# Patient Record
Sex: FEMALE | Race: OTHER | Employment: UNEMPLOYED | URBAN - METROPOLITAN AREA
[De-identification: names, ages, dates, MRNs, and addresses within clinical notes are randomized per-mention and may not be internally consistent; named-entity substitution may affect disease eponyms.]

---

## 2017-09-08 ENCOUNTER — HOSPITAL ENCOUNTER (EMERGENCY)
Age: 27
Discharge: HOME | End: 2017-09-09
Attending: EMERGENCY MEDICINE
Payer: OTHER MISCELLANEOUS

## 2017-09-08 DIAGNOSIS — Z77.098 CHEMICAL EXPOSURE: Primary | ICD-10-CM

## 2017-09-08 PROCEDURE — 99282 EMERGENCY DEPT VISIT SF MDM: CPT

## 2017-09-09 VITALS
HEART RATE: 80 BPM | SYSTOLIC BLOOD PRESSURE: 109 MMHG | TEMPERATURE: 97.9 F | DIASTOLIC BLOOD PRESSURE: 72 MMHG | OXYGEN SATURATION: 100 % | RESPIRATION RATE: 15 BRPM

## 2017-09-09 NOTE — DISCHARGE INSTRUCTIONS
Exposure to Toxins: Care Instructions  Your Care Instructions  Toxins are poisonous substances that can harm your body. If your doctor is concerned that your symptoms are caused by exposure to a toxic substance, he or she may ask you about your home, your work, your family, and other aspects of your environment. You also may have blood tests or X-rays to find out if a toxin is in your body. For example, you may have been around smoke from a fire. Or you may have been around fumes from paints, solvents, or waste products from workshops or factories. But in some cases it may be hard to find out what you may have been exposed to. Sometimes it can take years before you have symptoms. For instance, a  may have lung disease many years after working in mines. And being exposed to some toxins can make health problems you already have worse. Follow-up care is a key part of your treatment and safety. Be sure to make and go to all appointments, and call your doctor if you are having problems. It's also a good idea to know your test results and keep a list of the medicines you take. How can you care for yourself at home? · If you think you may have been exposed to a toxin but are not sure what it might be, try to keep a written record of your symptoms. Note when and where you have symptoms. And note any possible health hazards. For example, you may find out that you feel sick to your stomach during your workweek, but you feel better on weekends. · It may help to increase the amount of fresh air in your home. · If you can, try to control your exposure to hazardous materials. ¨ Avoid cigarette smoke. Cigarettes contain many chemicals that are hazardous to your health. ¨ Keep your home clean and as free from dust as you can. Dust can irritate your lungs. ¨ Get a carbon monoxide alarm for your home. Carbon monoxide comes from cars, space heaters, and other heat sources. It can be deadly.   ¨ When you use cleaning or home improvement products, make sure to open windows or use an exhaust fan. Never mix household chemicals, such as chlorine and ammonia. Some mixtures can create toxic fumes that can be deadly. ¨ Read the label on house and garden chemicals. Be sure to follow all safety directions. Try to limit your use of lawn and garden pesticides. ¨ Keep in mind that the farther away you are from a hazardous source, the less exposure you will receive. When should you call for help? Call 911 anytime you think you may need emergency care. For example, call if:  · You have trouble breathing. Call your doctor now or seek immediate medical care if:  · You get household chemicals in your mouth or eyes. Call the [address] [place] [address] ([phone]). · You think you may have been exposed to a hazardous material.  Watch closely for changes in your health, and be sure to contact your doctor if:  · You do not get better as expected. Where can you learn more? Go to http://carmen-harjinder.info/. Enter B226 in the search box to learn more about \"Exposure to Toxins: Care Instructions. \"  Current as of: July 29, 2016  Content Version: 11.3  © 5740-8521 [employer]. Care instructions adapted under license by [employer] (which disclaims liability or warranty for this information). If you have questions about a medical condition or this instruction, always ask your healthcare professional. [place] [address] any warranty or liability for your use of this information.

## 2017-09-09 NOTE — ED TRIAGE NOTES
Assumed care of patient from decontamination process with 15 minute decon shower for organophosphate exposure around 2000 at place of work. Pt. States they were exposed to bug bomb chemical for approximately 30 minutes. Patient is A&Ox4, respirations even and unlabored. Breath sounds clear in all fields. No symptoms of SLUDGE. VS stable. Patient reports she is 6 weeks pregnant, and has had some nausea with the pregnancy, but is not having any now.

## 2017-09-09 NOTE — ED PROVIDER NOTES
HPI Comments: Leo Mahoney is a 11 week pregnant 32 y.o. female, no significant pmhx, who presents ambulatory to the ED c/o sudden onset HA x 1930 today. 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 Pt reports that the bug bomb went off at ~7:30PM (usually scheduled for 1am when all workers are gone), and further report that she was exposed for ~1hr. Pt states that those first exposed to the gas spoke with the supervisors, who advised they come into the [address] [facility] ED for evaluation. She reports that her HA began after her exposure to the organophosphates. She endorses nausea with her pregnancy, but has not had any nausea today. Pt specifically denies nausea, vomiting or diarrhea. PCP: PROVIDER UNKNOWN      Social Hx: -tobacco, -EtOH, -Illicit Drugs  FHx: no pertinent family hx   Medication Allergies: none      There are no other complaints, changes, or physical findings at this time. The history is provided by the patient. History reviewed. No pertinent past medical history. History reviewed. No pertinent surgical history. History reviewed. No pertinent family history. Social History     Social History    Marital status: SINGLE     Spouse name: N/A    Number of children: N/A    Years of education: N/A     Occupational History    Not on file. Social History Main Topics    Smoking status: Never Smoker    Smokeless tobacco: Never Used    Alcohol use No    Drug use: Not on file    Sexual activity: Not on file     Other Topics Concern    Not on file     Social History Narrative    No narrative on file         ALLERGIES: Review of patient's allergies indicates no known allergies. Review of Systems   Constitutional: Negative for activity change, appetite change, chills, fever and unexpected weight change. HENT: Negative for congestion. Eyes: Negative for pain and visual disturbance. Respiratory: Negative for cough and shortness of breath. Cardiovascular: Negative for chest pain. Gastrointestinal: Negative for abdominal pain, diarrhea, nausea and vomiting. Genitourinary: Negative for dysuria. Musculoskeletal: Negative for back pain. Skin: Negative for rash. Neurological: Positive for headaches. Patient Vitals for the past 12 hrs:   Temp Pulse Resp BP SpO2   09/09/17 0022 97.9 °F (36.6 °C) 80 15 109/72 100 %       Physical Exam   Constitutional: She is oriented to person, place, and time. She appears well-developed and well-nourished. HENT:   Head: Normocephalic and atraumatic. Mouth/Throat: Oropharynx is clear and moist.   No excessive secretions    Eyes: Conjunctivae and EOM are normal. Pupils are equal, round, and reactive to light. Right eye exhibits no discharge. Left eye exhibits no discharge. Neck: Normal range of motion. Neck supple. Cardiovascular: Normal rate and normal heart sounds. No murmur heard. Pulmonary/Chest: Effort normal and breath sounds normal. No respiratory distress. She has no wheezes. She has no rales. Abdominal: Soft. Bowel sounds are normal. She exhibits no distension. There is no tenderness. Musculoskeletal: Normal range of motion. Neurological: She is alert and oriented to person, place, and time. No cranial nerve deficit. She exhibits normal muscle tone. Skin: Skin is warm and dry. No rash noted. She is not diaphoretic. Nursing note and vitals reviewed. MDM  Number of Diagnoses or Management Options  Chemical exposure:   Diagnosis management comments: Organophosphate exposure with no evidence of SLUDGE. Pt denies any abdominal cramping, VB. Discussed home care as well as after care instructions. Concern given pregnancy.         Amount and/or Complexity of Data Reviewed  Review and summarize past medical records: yes    Patient Progress  Patient progress: stable    ED Course       Procedures  DISCHARGE NOTE:    IMPRESSION:  1. Chemical exposure        PLAN:  Discharge Medication List as of 9/9/2017 12:43 AM        Follow-up Information     Follow up With Details Comments Contact Info    [facility] EMERGENCY DEPT  If symptoms worsen [address] [phone]        Return to ED if worse     DISCHARGE NOTE:  2:41 AM  Pt has been reexamined. Pt has no new complaints, changes, or physical findings. Care plan outlined and precautions discussed. All available results reviewed with pt. All medications reviewed with pt. All of pts questions and concerns addressed. Pt agrees to f/u as instructed and agrees to return to ED upon further deterioration. Pt is ready to go home. Written by Deng Tucker ED Scribe as dictated by Alexander Patrick MD    ATTESTATION   This note is prepared by Deng Tucker acting as scribe for MD Alexander Deluna MD : The scribe's documentation has been prepared under my direction and personally reviewed by me in its entirety. I confirm that the note above accurately reflects all work, treatment, procedures, and medical decision making performed by me.

## 2017-09-09 NOTE — ED NOTES
Dr. Ninfa Carter has reviewed discharge instructions with the patient. The patient verbalized understanding. Pt. A&Ox4, respirations even and unlabored. VS stable as noted in flowsheet. Declined wheelchair, ambulatory from department with paperwork in hand.

## 2019-04-22 ENCOUNTER — APPOINTMENT (OUTPATIENT)
Dept: GENERAL RADIOLOGY | Age: 29
End: 2019-04-22
Attending: PHYSICIAN ASSISTANT
Payer: MEDICAID

## 2019-04-22 ENCOUNTER — HOSPITAL ENCOUNTER (EMERGENCY)
Age: 29
Discharge: HOME | End: 2019-04-22
Attending: EMERGENCY MEDICINE
Payer: MEDICAID

## 2019-04-22 ENCOUNTER — APPOINTMENT (OUTPATIENT)
Dept: ULTRASOUND IMAGING | Age: 29
End: 2019-04-22
Attending: PHYSICIAN ASSISTANT
Payer: MEDICAID

## 2019-04-22 VITALS
HEART RATE: 100 BPM | SYSTOLIC BLOOD PRESSURE: 118 MMHG | RESPIRATION RATE: 16 BRPM | DIASTOLIC BLOOD PRESSURE: 78 MMHG | TEMPERATURE: 98.9 F | OXYGEN SATURATION: 99 %

## 2019-04-22 DIAGNOSIS — R10.13 ABDOMINAL PAIN, EPIGASTRIC: Primary | ICD-10-CM

## 2019-04-22 LAB
ALBUMIN SERPL-MCNC: 4 G/DL (ref 3.5–5)
ALBUMIN/GLOB SERPL: 1 {RATIO} (ref 1.1–2.2)
ALP SERPL-CCNC: 99 U/L (ref 45–117)
ALT SERPL-CCNC: 61 U/L (ref 12–78)
ANION GAP SERPL CALC-SCNC: 6 MMOL/L (ref 5–15)
APPEARANCE UR: ABNORMAL
AST SERPL-CCNC: 46 U/L (ref 15–37)
BACTERIA URNS QL MICRO: NEGATIVE /HPF
BASOPHILS # BLD: 0 K/UL (ref 0–0.1)
BASOPHILS NFR BLD: 0 % (ref 0–1)
BILIRUB SERPL-MCNC: 0.4 MG/DL (ref 0.2–1)
BILIRUB UR QL: NEGATIVE
BUN SERPL-MCNC: 7 MG/DL (ref 6–20)
BUN/CREAT SERPL: 12 (ref 12–20)
CALCIUM SERPL-MCNC: 9 MG/DL (ref 8.5–10.1)
CHLORIDE SERPL-SCNC: 106 MMOL/L (ref 97–108)
CO2 SERPL-SCNC: 25 MMOL/L (ref 21–32)
COLOR UR: ABNORMAL
COMMENT, HOLDF: NORMAL
CREAT SERPL-MCNC: 0.57 MG/DL (ref 0.55–1.02)
D DIMER PPP FEU-MCNC: 0.36 MG/L FEU (ref 0–0.65)
DIFFERENTIAL METHOD BLD: ABNORMAL
EOSINOPHIL # BLD: 0.1 K/UL (ref 0–0.4)
EOSINOPHIL NFR BLD: 3 % (ref 0–7)
EPITH CASTS URNS QL MICRO: ABNORMAL /LPF
ERYTHROCYTE [DISTWIDTH] IN BLOOD BY AUTOMATED COUNT: 12.5 % (ref 11.5–14.5)
GLOBULIN SER CALC-MCNC: 4 G/DL (ref 2–4)
GLUCOSE SERPL-MCNC: 125 MG/DL (ref 65–100)
GLUCOSE UR STRIP.AUTO-MCNC: NEGATIVE MG/DL
HCG UR QL: NEGATIVE
HCT VFR BLD AUTO: 39.8 % (ref 35–47)
HGB BLD-MCNC: 13.3 G/DL (ref 11.5–16)
HGB UR QL STRIP: ABNORMAL
HYALINE CASTS URNS QL MICRO: ABNORMAL /LPF (ref 0–5)
IMM GRANULOCYTES # BLD AUTO: 0 K/UL (ref 0–0.04)
IMM GRANULOCYTES NFR BLD AUTO: 0 % (ref 0–0.5)
KETONES UR QL STRIP.AUTO: NEGATIVE MG/DL
LEUKOCYTE ESTERASE UR QL STRIP.AUTO: NEGATIVE
LIPASE SERPL-CCNC: 174 U/L (ref 73–393)
LYMPHOCYTES # BLD: 1.6 K/UL (ref 0.8–3.5)
LYMPHOCYTES NFR BLD: 35 % (ref 12–49)
MCH RBC QN AUTO: 30.2 PG (ref 26–34)
MCHC RBC AUTO-ENTMCNC: 33.4 G/DL (ref 30–36.5)
MCV RBC AUTO: 90.2 FL (ref 80–99)
MONOCYTES # BLD: 0.7 K/UL (ref 0–1)
MONOCYTES NFR BLD: 14 % (ref 5–13)
NEUTS SEG # BLD: 2.2 K/UL (ref 1.8–8)
NEUTS SEG NFR BLD: 48 % (ref 32–75)
NITRITE UR QL STRIP.AUTO: NEGATIVE
NRBC # BLD: 0 K/UL (ref 0–0.01)
NRBC BLD-RTO: 0 PER 100 WBC
PH UR STRIP: 7.5 [PH] (ref 5–8)
PLATELET # BLD AUTO: 179 K/UL (ref 150–400)
PMV BLD AUTO: 10 FL (ref 8.9–12.9)
POTASSIUM SERPL-SCNC: 3.7 MMOL/L (ref 3.5–5.1)
PROT SERPL-MCNC: 8 G/DL (ref 6.4–8.2)
PROT UR STRIP-MCNC: NEGATIVE MG/DL
RBC # BLD AUTO: 4.41 M/UL (ref 3.8–5.2)
RBC #/AREA URNS HPF: ABNORMAL /HPF (ref 0–5)
SAMPLES BEING HELD,HOLD: NORMAL
SODIUM SERPL-SCNC: 137 MMOL/L (ref 136–145)
SP GR UR REFRACTOMETRY: 1.01 (ref 1–1.03)
TROPONIN I SERPL-MCNC: <0.05 NG/ML
UR CULT HOLD, URHOLD: NORMAL
UROBILINOGEN UR QL STRIP.AUTO: 0.2 EU/DL (ref 0.2–1)
WBC # BLD AUTO: 4.5 K/UL (ref 3.6–11)
WBC URNS QL MICRO: ABNORMAL /HPF (ref 0–4)

## 2019-04-22 PROCEDURE — 85025 COMPLETE CBC W/AUTO DIFF WBC: CPT

## 2019-04-22 PROCEDURE — 99284 EMERGENCY DEPT VISIT MOD MDM: CPT

## 2019-04-22 PROCEDURE — 83690 ASSAY OF LIPASE: CPT

## 2019-04-22 PROCEDURE — 76705 ECHO EXAM OF ABDOMEN: CPT

## 2019-04-22 PROCEDURE — 85379 FIBRIN DEGRADATION QUANT: CPT

## 2019-04-22 PROCEDURE — 74011250636 HC RX REV CODE- 250/636: Performed by: PHYSICIAN ASSISTANT

## 2019-04-22 PROCEDURE — 96374 THER/PROPH/DIAG INJ IV PUSH: CPT

## 2019-04-22 PROCEDURE — 80053 COMPREHEN METABOLIC PANEL: CPT

## 2019-04-22 PROCEDURE — 74011250637 HC RX REV CODE- 250/637: Performed by: PHYSICIAN ASSISTANT

## 2019-04-22 PROCEDURE — 84484 ASSAY OF TROPONIN QUANT: CPT

## 2019-04-22 PROCEDURE — 71046 X-RAY EXAM CHEST 2 VIEWS: CPT

## 2019-04-22 PROCEDURE — 93005 ELECTROCARDIOGRAM TRACING: CPT

## 2019-04-22 PROCEDURE — 81001 URINALYSIS AUTO W/SCOPE: CPT

## 2019-04-22 PROCEDURE — 81025 URINE PREGNANCY TEST: CPT

## 2019-04-22 PROCEDURE — 74011000250 HC RX REV CODE- 250: Performed by: PHYSICIAN ASSISTANT

## 2019-04-22 RX ORDER — OMEPRAZOLE 40 MG/1
40 CAPSULE, DELAYED RELEASE ORAL DAILY
Qty: 30 CAP | Refills: 0 | Status: SHIPPED | OUTPATIENT
Start: 2019-04-22 | End: 2020-12-03

## 2019-04-22 RX ORDER — KETOROLAC TROMETHAMINE 30 MG/ML
15 INJECTION, SOLUTION INTRAMUSCULAR; INTRAVENOUS
Status: COMPLETED | OUTPATIENT
Start: 2019-04-22 | End: 2019-04-22

## 2019-04-22 RX ADMIN — KETOROLAC TROMETHAMINE 15 MG: 30 INJECTION, SOLUTION INTRAMUSCULAR at 21:10

## 2019-04-22 RX ADMIN — LIDOCAINE HYDROCHLORIDE 40 ML: 20 SOLUTION ORAL; TOPICAL at 21:10

## 2019-04-23 LAB
ATRIAL RATE: 113 BPM
CALCULATED P AXIS, ECG09: 35 DEGREES
CALCULATED R AXIS, ECG10: 53 DEGREES
CALCULATED T AXIS, ECG11: 26 DEGREES
DIAGNOSIS, 93000: NORMAL
P-R INTERVAL, ECG05: 130 MS
Q-T INTERVAL, ECG07: 320 MS
QRS DURATION, ECG06: 70 MS
QTC CALCULATION (BEZET), ECG08: 438 MS
VENTRICULAR RATE, ECG03: 113 BPM

## 2019-04-23 NOTE — ED PROVIDER NOTES
29 y.o. female with no significant past medical history who presents from home with chief complaint of epigastric pain. Pt reports intermittent burning epigastric abdominal pain for the past year. She notes the pain onsets everyday usually in the afternoon. Pt states the pain was worse today, prompting her visit to the ED. She reports associated shortness of breath with the pain. Pt states she recent post partum, and mentioned the pain to her OB, who recommended her to take an antiacid, but she is not currently taking it. Pt specifically denies any fever, chills, cough, congestion, chest pain, nausea, vomiting, diarrhea, dysuria, or urinary frequency. Pt is otherwise healthy and denies any long standing illnesses or medications. There are no other acute medical concerns at this time. Social Hx: negative tobacco use, negative EtOH use, negative Illicit Drug use PCP: Unknown, Provider Note written by Nilda Carter, as dictated by Fabrice Dempsey PA-C 8:21 PM 
 
The history is provided by the patient. A  was used. No past medical history on file. No past surgical history on file. No family history on file. Social History Socioeconomic History  Marital status: SINGLE Spouse name: Not on file  Number of children: Not on file  Years of education: Not on file  Highest education level: Not on file Occupational History  Not on file Social Needs  Financial resource strain: Not on file  Food insecurity:  
  Worry: Not on file Inability: Not on file  Transportation needs:  
  Medical: Not on file Non-medical: Not on file Tobacco Use  Smoking status: Never Smoker  Smokeless tobacco: Never Used Substance and Sexual Activity  Alcohol use: No  
 Drug use: Not on file  Sexual activity: Not on file Lifestyle  Physical activity:  
  Days per week: Not on file Minutes per session: Not on file  Stress: Not on file Relationships  Social connections:  
  Talks on phone: Not on file Gets together: Not on file Attends Yarsanism service: Not on file Active member of club or organization: Not on file Attends meetings of clubs or organizations: Not on file Relationship status: Not on file  Intimate partner violence:  
  Fear of current or ex partner: Not on file Emotionally abused: Not on file Physically abused: Not on file Forced sexual activity: Not on file Other Topics Concern  Not on file Social History Narrative  Not on file ALLERGIES: Patient has no known allergies. Review of Systems Constitutional: Negative. Eyes: Negative for photophobia, pain, discharge and visual disturbance. Respiratory: Positive for shortness of breath. Negative for apnea and chest tightness. Cardiovascular: Negative for chest pain, palpitations and leg swelling. Gastrointestinal: Positive for abdominal pain. Negative for abdominal distention and blood in stool. Genitourinary: Negative for difficulty urinating, dysuria, flank pain, frequency and hematuria. Musculoskeletal: Negative for back pain, gait problem, joint swelling, myalgias and neck pain. Skin: Negative for color change and pallor. Neurological: Negative for dizziness, syncope, weakness and numbness. Psychiatric/Behavioral: Negative for behavioral problems and confusion. The patient is not nervous/anxious. Vitals:  
 04/22/19 1912 Pulse: (!) 120 SpO2: 98% Physical Exam  
Constitutional: She is oriented to person, place, and time. She appears well-developed and well-nourished. No distress. HENT:  
Head: Normocephalic and atraumatic. Right Ear: External ear normal.  
Left Ear: External ear normal.  
Nose: Nose normal.  
Mouth/Throat: Oropharynx is clear and moist.  
Eyes: Pupils are equal, round, and reactive to light.  Conjunctivae and EOM are normal. Right eye exhibits no discharge. Left eye exhibits no discharge. Neck: Normal range of motion. Neck supple. Cardiovascular: Normal rate, regular rhythm, normal heart sounds and intact distal pulses. Pulmonary/Chest: Effort normal and breath sounds normal.  
Abdominal: Soft. Bowel sounds are normal. She exhibits no distension. There is tenderness (mild) in the epigastric area. There is no rebound and no guarding. Musculoskeletal: Normal range of motion. She exhibits no edema or tenderness. Neurological: She is alert and oriented to person, place, and time. No cranial nerve deficit. Coordination normal.  
Skin: Skin is warm and dry. No rash noted. Psychiatric: She has a normal mood and affect. Her behavior is normal. Judgment and thought content normal.  
Nursing note and vitals reviewed. Note written by Nilda Rodriguez, as dictated by Yousif Burgess PA-C 8:21 PM 
 
MDM Number of Diagnoses or Management Options Abdominal pain, epigastric:  
  
Amount and/or Complexity of Data Reviewed Clinical lab tests: ordered and reviewed Tests in the radiology section of CPT®: ordered and reviewed Discuss the patient with other providers: yes Procedures PROGRESS NOTE 
9:57 PM 
Pt noted to be feeling better , ready for discharge. Discussed lab and imaging findings with pt and/or family. Discharge with [facility]. Will follow up as instructed with PCP. Specific return precautions provided as well as instructions to return to the ED should symptoms worsen at any time. 9:57 PM 
Patient's results have been reviewed with them. Patient and/or family have verbally conveyed their understanding and agreement of the patient's signs, symptoms, diagnosis, treatment and prognosis and additionally agree to follow up as recommended or return to the Emergency Room should their condition change prior to follow-up.   Discharge instructions have also been provided to the patient with some educational information regarding their diagnosis as well a list of reasons why they would want to return to the ER prior to their follow-up appointment should their condition change.  
Linda Joseph PA

## 2019-04-23 NOTE — DISCHARGE INSTRUCTIONS
Patient Education        Indigestión (Vernona Catena estomacal): Instrucciones de cuidado - [ Indigestion (Dyspepsia or Heartburn): Care Instructions ]  Instrucciones de cuidado  A veces puede ser difícil determinar la causa de la indigestión (dispepsia o acidez estomacal). En [place], los casos de [employer] con abotagamiento, ardor, eructos y náuseas son leves y desaparecen después de varias horas. El tratamiento en el hogar y los medicamentos de venta aspen a menudo pueden [employer] síntomas. Sheryl si usted shannon algún medicamento para aliviar la indigestión sin hacer cambios en adair Alyson Makos y estilo de porter, es muy probable que vuelvan alecia síntomas repetidamente. Si tiene indigestión con frecuencia, esto podría indicar un problema de honorio más grave. Asegúrese de hacer un seguimiento con adair médico, el cual podría necesitar pruebas para asegurarse de cuál es la causa de adair indigestión. La atención de seguimiento es apurva parte clave de adair tratamiento y seguridad. Asegúrese de hacer y acudir a todas las citas, y llame a adair médico si está teniendo problemas. También es apurva buena idea saber los resultados de alecia exámenes y mantener apurva lista de los medicamentos que shannon. ¿Cómo puede cuidarse en el [facility]? · Adair médico podría recomendarle medicamentos de [employer]. Para la indigestión leve u ocasional podrían ayudarle los antiácidos shelia Tums, Gaviscon, Mylanta o Maalox. Tenga cuidado cuando tome medicamentos antiácidos de [employer]. Muchos de estos medicamentos contienen aspirina. Albina la etiqueta para asegurarse de no estar tomando más de la dosis recomendada. Demasiada aspirina puede ser perjudicial.  · Adair médico también podría recomendar reductores de ácido de venta aspen, shelia Pepcid AC, Tagamet HB, Zantac 75 o Prilosec. Albina y siga todas las instrucciones de la [place]. Si [place] estos medicamentos con frecuencia, hable con adair médico.  · Cambie alecia hábitos alimentarios.   ? Es mejor comer varias comidas pequeñas en lugar de dos o dallin comidas abundantes. ? Después de comer, espere de 2 a 3 horas antes de recostarse. ? El chocolate, la menta y el alcohol pueden empeorar la GERD. ? En [employer], los [employer], los alimentos que tienen mucho ácido (shelia los tomates y las naranjas) y el café pueden empeorar los síntomas de la GERD. Si alecia síntomas empeoran después de comer un determinado alimento, se recomienda que deje de comer jolly alimento para twin si alecia síntomas mejoran. · No fume ni masque tabaco. Fumar puede agravar la GERD. Si necesita ayuda para dejar de usar tabaco, hable con adair médico sobre programas y medicamentos para dejar de usarlo. Estos pueden aumentar alecia probabilidades de dejar el hábito para siempre. · Si tiene síntomas de GERD zoe la noche, eleve la cabecera de adair cama entre 6 y 6 pulgadas (entre 15 y 20 cm) colocando bloques debajo del deena de la cama o apurva cuña de espuma debajo de la cabecera del colchón. (Usar almohadas adicionales no funciona). · No use ropa que le ajuste mucho la parte media del cuerpo. · Baje de peso, si necesita hacerlo. Perder solo de 5 a 10 libras (2.3 a 4.5 kg) puede ayudarle. · No tome medicamentos antiinflamatorios shelia aspirina, ibuprofeno (Advil, Motrin) o naproxeno (Aleve). Estos medicamentos pueden irritarle el estómago. Si necesita un analgésico (medicamento para el dolor), pruebe con acetaminofén (Tylenol), que no causa malestar estomacal.  ¿Cuándo debe pedir ayuda? Llame al 911 en cualquier momento que considere que necesita atención de [place]. Por ejemplo, llame si:    · Se desmayó (perdió el conocimiento).   · Vomita stanton o algo parecido a granos de café molido.     · Las heces son de color rojizo o muy sanguinolentas (con stanton).   · Siente dolor o presión en el pecho. Estos síntomas podrían estar acompañados de:  ? Sudoración. ? Falta de aire. ? Náuseas o vómito.   ? Dolor que se extiende del pecho al erum, la mandíbula, o hacia alysia o ambos hombros o [id number] ? Sensación de [address]. ? Pulso rápido o irregular. Después de llamar al 911, mastique 1 aspirina para adultos. Espere apurva ambulancia. No trate de conducir usted mismo un automóvil.    Llame a adair médico ahora mismo o busque atención médica inmediata si:    · Tiene dolor abdominal intenso.     · Jenny heces son negruzcas y parecidas al alquitrán o tienen rastros de [place].     · Tiene dificultades para tragar.     · Pierde peso y no sabe por qué.    Preste especial atención a los cambios en adair honorio y asegúrese de comunicarse con adair médico si:    · No mejora shelia se esperaba. ¿Dónde puede encontrar más información en inglés? Jose Botello a [email or web address] Waldo Duncaner G547 en la búsqueda para aprender más acerca de \"Indigestión (dispepsia o acidez estomacal): Instrucciones de cuidado - [ Indigestion (Dyspepsia or Heartburn): Care Instructions ]. \"  Revisado: Araceli 67, 2018  Versión del contenido: 11.9  © 2177-4947 Healthwise, Incorporated. Las instrucciones de cuidado fueron adaptadas bajo licencia por [employer] (which disclaims liability or warranty for this information). Si usted tiene [place] afección médica o sobre estas instrucciones, siempre pregunte a adair profesional de honorio. Healthwise, Incorporated niega toda garantía o responsabilidad por adair uso de esta información. Patient Education        Dolor abdominal: Instrucciones de cuidado - [ Abdominal Pain: Care Instructions ]  Instrucciones de cuidado    El dolor abdominal tiene muchas causas posibles. Algunas de ellas no son graves y mejoran por sí solas en unos días. Otras requieren Radha Ana y [place]. Si adair dolor continúa o GANG[place]DOFE, necesitará apurva nueva revisión y [place] pruebas para determinar qué pasa. Es posible que necesite cirugía para corregir el problema.   No ignore nuevos síntomas, shelia fiebre, náuseas y Nicki, [address] urinarios, dolor que SAMMIE o [place]. Podrían ser señales de un problema más grave. Adair médico puede haberle recomendado apurva consulta de Nader Garnican las 8 o 12 horas siguientes. Si no se siente mejor, es posible que requiera [address] o [address] El médico lo lyn revisado minuciosamente, walter puede shelly problemas más tarde. Si nota algún problema o síntomas nuevos, busque tratamiento médico inmediatamente. La atención de seguimiento es apurva parte clave de adair tratamiento y seguridad. Asegúrese de hacer y acudir a todas las citas, y llame a adair médico si está teniendo problemas. También es apurva buena idea saber los resultados de alecia exámenes y mantener apurva lista de los medicamentos que shannon. ¿Cómo puede cuidarse en el hogar? · Descanse hasta que se sienta mejor. · Para prevenir la deshidratación, aida abundantes líquidos, suficientes para que adair orina sea de color amarillo rajesh o transparente shelia el agua. Elija beber agua y otros líquidos jessi sin cafeína hasta que se sienta mejor. Si tiene [facility], del corazón o del hígado y tiene que Abbie's líquidos, hable con adair médico antes de aumentar adair consumo. · Si tiene [employer], coma alimentos suaves, shelia arroz, pan priya seco o galletas saladas, bananas (plátanos) y puré de Synchari. Trate de comer varias comidas pequeñas al día en lugar de dos o dallin grandes. · Espere hasta 48 horas después de que todos los síntomas hayan desaparecido antes de comer alimentos condimentados, alcohol y bebidas que contengan cafeína. · No consuma alimentos ricos en grasa. · Evite medicamentos antiinflamatorios shelia aspirina, ibuprofeno (Advil, Motrin) y naproxeno (Aleve). Pueden causar [employer]. Dígale a adair médico si está tomando aspirina diariamente debido a otro problema de honorio. ¿Cuándo debe pedir ayuda? Llame al 911 en cualquier momento que considere que necesita atención de emergencia.  Por ejemplo, llame si:    · Se desmayó (perdió el conocimiento).   · Las heces son de color rojizo o muy sanguinolentas (con stanton).   · Vomita stanton o algo parecido a granos de café molido.     · Tiene dolor abdominal nuevo e intenso.    Llame a adair médico ahora mismo o busque atención médica inmediata si:    · Adair dolor empeora, sobre todo si se concentra en apurva odessa parte del vientre.     · Vuelve a tener fiebre o tiene fiebre más deana.     · Jenny heces son negruzcas y parecidas al alquitrán o tienen rastros de stanton.     · Tiene sangrado vaginal inesperado.     · Tiene síntomas de apurva infección del tracto urinario. Estos podrían incluir:  ? Dolor al Farrah Gut. ? Orinar con más frecuencia que lo habitual.  ? [place] en la [facility].     · Siente mareos o aturdimiento, o que está a punto de desmayarse.    Preste especial atención a los cambios en adair honorio y asegúrese de comunicarse con adair médico si:    · No está mejorando después de 1 día (24 horas). ¿Dónde puede encontrar más información en inglés? Yessi Cast a [email or web address] Adry Forth [id number] en la búsqueda para aprender más acerca de \"Dolor abdominal: Instrucciones de cuidado - [ Abdominal Pain: Care Instructions ]. \"  Revisado: 23 septiembre, 2018  Versión del contenido: 11.9  © 5992-6592 Healthwise, Incorporated. Las instrucciones de cuidado fueron adaptadas bajo licencia por [employer] (which disclaims liability or warranty for this information). Si usted tiene [place] afección médica o sobre estas instrucciones, siempre pregunte a adair profesional de honorio. Healthwise, Incorporated niega toda garantía o responsabilidad por adair uso de esta información.

## 2019-04-23 NOTE — ED TRIAGE NOTES
TRIAGE NOTE:  Patient arrives with c/o abdominal pain, patient reports a burning sensation in stomach and states \"it makes it hard to breath sometimes\". Patient reports pain has been going on for a year and \"always in the afternoon\". Patient reports pain was stronger today. Triage completed using [employer] phone.

## 2019-04-23 NOTE — ED NOTES
Discharge instructions given to pt. All questions answered and pt verbalized understanding. V/S stable @ time of discharge. Pt ambulatory out of unit. Discharge completed using [employer] phone.

## 2019-07-19 ENCOUNTER — HOSPITAL ENCOUNTER (EMERGENCY)
Age: 29
Discharge: HOME | End: 2019-07-19
Attending: EMERGENCY MEDICINE
Payer: MEDICAID

## 2019-07-19 VITALS
HEIGHT: 61 IN | SYSTOLIC BLOOD PRESSURE: 108 MMHG | BODY MASS INDEX: 24.55 KG/M2 | DIASTOLIC BLOOD PRESSURE: 74 MMHG | WEIGHT: 130 LBS | HEART RATE: 96 BPM | OXYGEN SATURATION: 98 % | RESPIRATION RATE: 16 BRPM | TEMPERATURE: 98.7 F

## 2019-07-19 DIAGNOSIS — H10.211 CHEMICAL CONJUNCTIVITIS OF RIGHT EYE: Primary | ICD-10-CM

## 2019-07-19 PROCEDURE — 99282 EMERGENCY DEPT VISIT SF MDM: CPT

## 2019-07-19 RX ORDER — CIPROFLOXACIN HYDROCHLORIDE 3.5 MG/ML
1 SOLUTION/ DROPS TOPICAL 4 TIMES DAILY
Qty: 2.5 ML | Refills: 0 | Status: SHIPPED | OUTPATIENT
Start: 2019-07-19 | End: 2020-12-03

## 2019-07-19 NOTE — DISCHARGE INSTRUCTIONS
Patient Education        Conjuntivitis: Instrucciones de cuidado - [ Raf: Care Instructions ]  Instrucciones de cuidado    La conjuntivitis es el enrojecimiento y la hinchazón de la superficie del remigio y de la conjuntiva (el recubrimiento del párpado y [address]). La conjuntivitis suele ser causada por apurva infección bacteriana o viral. El aire seco, las [place] humo y las sustancias químicas son otras causas comunes. La conjuntivitis suele sanar por sí odessa al cabo de 7 a 10 días. Los antibióticos solo ayudan si la conjuntivitis está causada por bacterias. La conjuntivitis causada por apurva infección se propaga fácilmente. Si apurva alergia o sustancia química es la causa de la conjuntivitis, esta no desaparecerá a menos que usted evite lo que la esté causando. La atención de seguimiento es apurva parte clave de adair tratamiento y seguridad. Asegúrese de hacer y acudir a todas las citas, y llame a adair médico si está teniendo problemas. También es apurva buena idea saber los resultados de alecia exámenes y mantener apurva lista de los medicamentos que shannon. ¿Cómo puede cuidarse en el hogar? · Lávese las rico con frecuencia. Siempre láveselas antes y después de tratarse la conjuntivitis o de tocarse los ojos o la mark. · Utilice un algodón húmedo o un paño limpio y húmedo para retirar las costras. Limpie desde la esquina interior del remigio hacia afuera. Use apurva parte limpia del paño para cada pasada. · Colóquese paños húmedos, fríos o tibios, sobre el remigio unas cuantas veces al día si el remigio le duele. · No use lentes de contacto ni maquillaje para los ojos hasta que la conjuntivitis haya desaparecido. Deseche todo el maquillaje para ojos que usaba cuando comenzó la conjuntivitis. Limpie alecia lentes de contacto y adair estuche. Si [place] lentes de contacto desechables, use un par nuevo cuando el remigio haya sanado y sea seguro volver a usar lentes de contacto.   · Si el médico le recetó apurva pomada o gotas antibióticas para los ojos, [address]. Use el medicamento todo el tiempo indicado, aunque el remigio comience a verse mejor en poco tiempo. Mantenga limpia la punta del frasco y no permita que la misma toque la asif del remigio. · Para ponerse gotas para los ojos o pomada:  ? Incline la Teri Reas atrás y lleve el párpado inferior hacia abajo con un dedo. ? Deje caer unas gotas o un chorrito del medicamento dentro del párpado inferior. ? Cierre el remigio por entre 30 y 61 segundos para permitir que las gotas o la pomada se esparzan. ? No permita que la punta del gotero o del tubo de pomada toque las pestañas ni ninguna otra superficie. · No comparta toallas de baño, almohadas ni toallitas para la mark mientras tenga conjuntivitis. ¿Cuándo debe pedir ayuda? Llame a adair médico ahora mismo o busque atención médica inmediata si:    · Tiene dolor en el remigio, no solo irritación en la superficie.     · Tiene algún cambio en la vista o pérdida de la visión.     · Tiene mayor secreción del remigio.     · El remigio no ha comenzado a mejorar o empeora dentro de las 50 horas después de empezar a usar antibióticos.     · La conjuntivitis dura más de 7 días.    Preste especial atención a los cambios en adair honorio y asegúrese de comunicarse con adair médico si tiene algún problema. ¿Dónde puede encontrar más información en inglés? Janet Magana a [email or web address] Kareem JOHANSEN en la búsqueda para aprender más acerca de \"Conjuntivitis: Instrucciones de cuidado - [ Raf: Care Instructions ]. \"  Revisado: 23 septiembre, 2018  Versión del contenido: 11.9  © 5507-6258 Healthwise, Incorporated. Las instrucciones de cuidado fueron adaptadas bajo licencia por [employer] (which disclaims liability or warranty for this information). Si usted tiene Dale Letona afección médica o sobre estas instrucciones, siempre pregunte a adair profesional de honorio.  [employer], [employer] niega toda jaylanantía o responsabilidad por adair uso de esta información.

## 2019-07-19 NOTE — ED TRIAGE NOTES
Pt to ED for right eye pain since yesterday. Pt has alkaline eye drops with her, states she accidentally squeezed too much in her eye. Pt is Colombian speaking.

## 2019-09-09 ENCOUNTER — APPOINTMENT (OUTPATIENT)
Dept: ULTRASOUND IMAGING | Age: 29
End: 2019-09-09
Attending: PHYSICIAN ASSISTANT

## 2019-09-09 ENCOUNTER — HOSPITAL ENCOUNTER (EMERGENCY)
Age: 29
Discharge: HOME | End: 2019-09-09
Attending: EMERGENCY MEDICINE | Admitting: EMERGENCY MEDICINE

## 2019-09-09 VITALS
SYSTOLIC BLOOD PRESSURE: 122 MMHG | HEIGHT: 60 IN | TEMPERATURE: 98.8 F | WEIGHT: 135 LBS | RESPIRATION RATE: 15 BRPM | OXYGEN SATURATION: 99 % | BODY MASS INDEX: 26.5 KG/M2 | HEART RATE: 96 BPM | DIASTOLIC BLOOD PRESSURE: 80 MMHG

## 2019-09-09 DIAGNOSIS — N93.9 ABNORMAL UTERINE BLEEDING (AUB): Primary | ICD-10-CM

## 2019-09-09 LAB
ABO + RH BLD: NORMAL
ALBUMIN SERPL-MCNC: 4.5 G/DL (ref 3.5–5)
ALBUMIN/GLOB SERPL: 1.1 {RATIO} (ref 1.1–2.2)
ALP SERPL-CCNC: 143 U/L (ref 45–117)
ALT SERPL-CCNC: 153 U/L (ref 12–78)
ANION GAP SERPL CALC-SCNC: 7 MMOL/L (ref 5–15)
APPEARANCE UR: CLEAR
AST SERPL-CCNC: 83 U/L (ref 15–37)
BACTERIA URNS QL MICRO: NEGATIVE /HPF
BASOPHILS # BLD: 0 K/UL (ref 0–0.1)
BASOPHILS NFR BLD: 0 % (ref 0–1)
BILIRUB SERPL-MCNC: 0.3 MG/DL (ref 0.2–1)
BILIRUB UR QL: NEGATIVE
BLOOD BANK CMNT PATIENT-IMP: NORMAL
BUN SERPL-MCNC: 5 MG/DL (ref 6–20)
BUN/CREAT SERPL: 8 (ref 12–20)
CALCIUM SERPL-MCNC: 9 MG/DL (ref 8.5–10.1)
CHLORIDE SERPL-SCNC: 108 MMOL/L (ref 97–108)
CO2 SERPL-SCNC: 26 MMOL/L (ref 21–32)
COLOR UR: ABNORMAL
COMMENT, HOLDF: NORMAL
CREAT SERPL-MCNC: 0.61 MG/DL (ref 0.55–1.02)
DIFFERENTIAL METHOD BLD: NORMAL
EOSINOPHIL # BLD: 0.2 K/UL (ref 0–0.4)
EOSINOPHIL NFR BLD: 3 % (ref 0–7)
EPITH CASTS URNS QL MICRO: ABNORMAL /LPF
ERYTHROCYTE [DISTWIDTH] IN BLOOD BY AUTOMATED COUNT: 13.8 % (ref 11.5–14.5)
GLOBULIN SER CALC-MCNC: 4 G/DL (ref 2–4)
GLUCOSE SERPL-MCNC: 95 MG/DL (ref 65–100)
GLUCOSE UR STRIP.AUTO-MCNC: NEGATIVE MG/DL
HCG SERPL-ACNC: <1 MIU/ML (ref 0–6)
HCT VFR BLD AUTO: 43.7 % (ref 35–47)
HGB BLD-MCNC: 14.1 G/DL (ref 11.5–16)
HGB UR QL STRIP: ABNORMAL
HYALINE CASTS URNS QL MICRO: ABNORMAL /LPF (ref 0–5)
IMM GRANULOCYTES # BLD AUTO: 0 K/UL (ref 0–0.04)
IMM GRANULOCYTES NFR BLD AUTO: 0 % (ref 0–0.5)
KETONES UR QL STRIP.AUTO: NEGATIVE MG/DL
LEUKOCYTE ESTERASE UR QL STRIP.AUTO: NEGATIVE
LYMPHOCYTES # BLD: 2.8 K/UL (ref 0.8–3.5)
LYMPHOCYTES NFR BLD: 40 % (ref 12–49)
MCH RBC QN AUTO: 28.4 PG (ref 26–34)
MCHC RBC AUTO-ENTMCNC: 32.3 G/DL (ref 30–36.5)
MCV RBC AUTO: 87.9 FL (ref 80–99)
MONOCYTES # BLD: 0.5 K/UL (ref 0–1)
MONOCYTES NFR BLD: 7 % (ref 5–13)
NEUTS SEG # BLD: 3.4 K/UL (ref 1.8–8)
NEUTS SEG NFR BLD: 50 % (ref 32–75)
NITRITE UR QL STRIP.AUTO: NEGATIVE
NRBC # BLD: 0 K/UL (ref 0–0.01)
NRBC BLD-RTO: 0 PER 100 WBC
PH UR STRIP: 7 [PH] (ref 5–8)
PLATELET # BLD AUTO: 233 K/UL (ref 150–400)
PMV BLD AUTO: 10.3 FL (ref 8.9–12.9)
POTASSIUM SERPL-SCNC: 3.7 MMOL/L (ref 3.5–5.1)
PROT SERPL-MCNC: 8.5 G/DL (ref 6.4–8.2)
PROT UR STRIP-MCNC: NEGATIVE MG/DL
RBC # BLD AUTO: 4.97 M/UL (ref 3.8–5.2)
RBC #/AREA URNS HPF: ABNORMAL /HPF (ref 0–5)
SAMPLES BEING HELD,HOLD: NORMAL
SODIUM SERPL-SCNC: 141 MMOL/L (ref 136–145)
SP GR UR REFRACTOMETRY: 1.01 (ref 1–1.03)
UROBILINOGEN UR QL STRIP.AUTO: 0.2 EU/DL (ref 0.2–1)
WBC # BLD AUTO: 6.9 K/UL (ref 3.6–11)
WBC URNS QL MICRO: ABNORMAL /HPF (ref 0–4)

## 2019-09-09 PROCEDURE — 84702 CHORIONIC GONADOTROPIN TEST: CPT

## 2019-09-09 PROCEDURE — 76817 TRANSVAGINAL US OBSTETRIC: CPT

## 2019-09-09 PROCEDURE — 85025 COMPLETE CBC W/AUTO DIFF WBC: CPT

## 2019-09-09 PROCEDURE — 74011250636 HC RX REV CODE- 250/636: Performed by: PHYSICIAN ASSISTANT

## 2019-09-09 PROCEDURE — 80053 COMPREHEN METABOLIC PANEL: CPT

## 2019-09-09 PROCEDURE — 76801 OB US < 14 WKS SINGLE FETUS: CPT

## 2019-09-09 PROCEDURE — 99283 EMERGENCY DEPT VISIT LOW MDM: CPT

## 2019-09-09 PROCEDURE — 36415 COLL VENOUS BLD VENIPUNCTURE: CPT

## 2019-09-09 PROCEDURE — 96374 THER/PROPH/DIAG INJ IV PUSH: CPT

## 2019-09-09 PROCEDURE — 81001 URINALYSIS AUTO W/SCOPE: CPT

## 2019-09-09 PROCEDURE — 86900 BLOOD TYPING SEROLOGIC ABO: CPT

## 2019-09-09 RX ORDER — ONDANSETRON 2 MG/ML
4 INJECTION INTRAMUSCULAR; INTRAVENOUS
Status: COMPLETED | OUTPATIENT
Start: 2019-09-09 | End: 2019-09-09

## 2019-09-09 RX ADMIN — ONDANSETRON 4 MG: 2 INJECTION INTRAMUSCULAR; INTRAVENOUS at 21:19

## 2019-09-09 RX ADMIN — SODIUM CHLORIDE 1000 ML: 900 INJECTION, SOLUTION INTRAVENOUS at 21:19

## 2019-09-09 NOTE — ED TRIAGE NOTES
Pt states that she had her depo shot on June 1st and on September 1st she started bleeding and passed a blood clot today which she has with her in a container. She states that if she was pregnant she did not know that she was. Pt states that she did have pain and cramping but denies any current pain.

## 2019-09-10 NOTE — ED NOTES
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

## 2019-09-10 NOTE — DISCHARGE INSTRUCTIONS
Patient Education        Darnella Bang uterino anormal: Instrucciones de cuidado - [ Abnormal Uterine Bleeding: Care Instructions ]  Instrucciones de cuidado    El sangrado uterino anormal (AUB, por alecia siglas en inglés) es un sangrado irregular del útero que dura más o es más intenso de lo normal o que no ocurre en el momento habitual para usted. A veces, se debe a cambios en los niveles hormonales. También puede estar causado por crecimientos en el útero, tales shelia fibromas o pólipos. A veces no se puede encontrar la causa. Podría tener mucho sangrado cuando no está esperando adair período. Adair médico puede sugerirle apurva prueba de embarazo si analisa que está Karie Falling. La atención de seguimiento es apurva parte clave de adair tratamiento y seguridad. Asegúrese de hacer y acudir a todas las citas, y llame a adair médico si está teniendo problemas. También es apurva buena idea saber los resultados de alecia exámenes y mantener apurva lista de los medicamentos que shannon. ¿Cómo puede cuidarse en el hogar? · Sea ankit con los medicamentos. [place] los analgésicos (medicamentos para el dolor) exactamente shelia le fueron indicados. ? Si el médico le recetó un analgésico, tómelo según las indicaciones. ? Si no está tomando un analgésico recetado, pregúntele a adair médico si puede adin alysia de [employer]. · Podría faltarle claire por la pérdida de stanton. Coma apurva dieta equilibrada con alto contenido de claire y vitamina C. Entre los alimentos ricos en claire se encuentran las barb chen, los [place], los [place], los frijoles (habichuelas) y las verduras de hojas verdes. Pregúntele a adair médico si necesita adin pastillas de claire o un multivitamínico.  ¿Cuándo debe pedir ayuda? Llame al 911 en cualquier momento que considere que necesita atención de [place].  Por ejemplo, llame si:    · Se desmayó (perdió el conocimiento).    Llame a adair médico ahora mismo o busque atención médica inmediata si:    · Tiene dolor repentino e intenso en el abdomen o la pelvis.     · Tiene sangrado vaginal intenso. Empapa alecia toallas sanitarias o tampones habituales cada hora zoe 2 o más horas.     · Siente mareos o aturdimiento, o que está a punto de desmayarse.    Preste especial atención a los cambios en adair honorio y asegúrese de comunicarse con adair médico si:    · Tiene un dolor nuevo en el abdomen o la pelvis.     · Tiene fiebre.     · El sangrado empeora o dura más de 1 semana.     · Piensa que podría estar embarazada. ¿Dónde puede encontrar más información en inglés? Maribell Whipple a [email or web address] Emigdio Brian F911 en la búsqueda para aprender más acerca de \"Sangrado uterino anormal: Instrucciones de cuidado - [ Abnormal Uterine Bleeding: Care Instructions ]. \"  Revisado: 19 febrero, 2019  Versión del contenido: 12.1  © 1626-0766 Healthwise, Incorporated. Las instrucciones de cuidado fueron adaptadas bajo licencia por [employer] (which disclaims liability or warranty for this information). Si usted tiene [place] afección médica o sobre estas instrucciones, siempre pregunte a adair profesional de honorio. [employer] niega toda garantía o responsabilidad por adair uso de esta información.

## 2019-09-10 NOTE — ED PROVIDER NOTES
34 y.o. female with past medical history significant for gastritis who presents from home with chief complaint of vaginal bleeding. Pt notes she last had a Depo injection in June. She had onset of light vaginal bleeding on 9/1, which she thought was the start of her menstrual cycle. Pt further notes the blood was darker in color, \"almost black\" compared to previous menstrual cycles. She reports intermittent light bleeding since, but this evening she noticed passing a tissue like clot, which she describes as \"hard and white. \" Pt additionally reports Nausea and some vomiting, but notes her 3 children at home all have similar symptoms. She has been taking OTC cold medication for her symptoms. She denies any pain at this time. Pt specifically denies any fever, chills, headache, cough, congestion, shortness of breath, chest pain, abdominal pain, diarrhea, dysuria, or urinary frequency. There are no other acute medical concerns at this time. Social Hx: negative tobacco use, negative EtOH use, negativeIllicit Drug use    PCP: Anthony Whiting MD  OBGYN: Dr. Nina Barragan at [facility]     Note written by Nilda Turner, as dictated by Raomna Dixon PA-C 8:57 PM    The history is provided by the patient. No  was used. No past medical history on file. No past surgical history on file. No family history on file.     Social History     Socioeconomic History    Marital status:      Spouse name: Not on file    Number of children: Not on file    Years of education: Not on file    Highest education level: Not on file   Occupational History    Not on file   Social Needs    Financial resource strain: Not on file    Food insecurity:     Worry: Not on file     Inability: Not on file    Transportation needs:     Medical: Not on file     Non-medical: Not on file   Tobacco Use    Smoking status: Never Smoker    Smokeless tobacco: Never Used   Substance and Sexual Activity    Alcohol use: No    Drug use: Not on file    Sexual activity: Not on file   Lifestyle    Physical activity:     Days per week: Not on file     Minutes per session: Not on file    Stress: Not on file   Relationships    Social connections:     Talks on phone: Not on file     Gets together: Not on file     Attends Evangelical service: Not on file     Active member of club or organization: Not on file     Attends meetings of clubs or organizations: Not on file     Relationship status: Not on file    Intimate partner violence:     Fear of current or ex partner: Not on file     Emotionally abused: Not on file     Physically abused: Not on file     Forced sexual activity: Not on file   Other Topics Concern    Not on file   Social History Narrative    Not on file         ALLERGIES: Patient has no known allergies. Review of Systems   Constitutional: Negative. HENT: Negative for ear discharge. Eyes: Negative for photophobia, pain, discharge and visual disturbance. Respiratory: Negative for apnea, cough, chest tightness and shortness of breath. Cardiovascular: Negative for chest pain, palpitations and leg swelling. Gastrointestinal: Positive for nausea and vomiting. Negative for abdominal distention, abdominal pain and blood in stool. Genitourinary: Positive for vaginal bleeding. Negative for difficulty urinating, dysuria, flank pain, frequency and hematuria. Musculoskeletal: Negative for back pain, gait problem, joint swelling, myalgias and neck pain. Skin: Negative for color change and pallor. Neurological: Negative for dizziness, syncope, weakness, numbness and headaches. Psychiatric/Behavioral: Negative for behavioral problems and confusion. The patient is not nervous/anxious.         Vitals:    09/09/19 1917 09/09/19 1924   BP: 118/75    Pulse: (!) 118    Resp: 16    Temp: 99 °F (37.2 °C)    SpO2: 98%    Weight:  61.2 kg (135 lb)   Height:  5' (1.524 m)            Physical Exam   Constitutional: She is oriented to person, place, and time. She appears well-developed and well-nourished. No distress. HENT:   Head: Normocephalic and atraumatic. Right Ear: External ear normal.   Left Ear: External ear normal.   Nose: Nose normal.   Mouth/Throat: Oropharynx is clear and moist.   Eyes: Pupils are equal, round, and reactive to light. Conjunctivae and EOM are normal. Right eye exhibits no discharge. Left eye exhibits no discharge. Neck: Normal range of motion. Neck supple. Cardiovascular: Normal rate, regular rhythm, normal heart sounds and intact distal pulses. Pulmonary/Chest: Effort normal and breath sounds normal.   Abdominal: Soft. Bowel sounds are normal. She exhibits no distension. There is no tenderness. There is no rebound and no guarding. Musculoskeletal: Normal range of motion. She exhibits no edema or tenderness. Neurological: She is alert and oriented to person, place, and time. No cranial nerve deficit. Coordination normal.   Skin: Skin is warm and dry. No rash noted. Psychiatric: She has a normal mood and affect. Her behavior is normal. Judgment and thought content normal.   Nursing note and vitals reviewed. Note written by Nilda Carroll, as dictated by Gabby Russell PA-C 8:57 PM    MDM  Number of Diagnoses or Management Options  Abnormal uterine bleeding (AUB):      Amount and/or Complexity of Data Reviewed  Clinical lab tests: ordered and reviewed  Tests in the radiology section of CPT®: ordered and reviewed  Discuss the patient with other providers: yes           Procedures    Hcg <1. Pt aware she has not been pregnant recently. BURKE [place]    Patient has been reassessed. Feeling much better. Reviewed labs, medications and radiographics with patient. Ready to discharge home. Will follow up with GYN. Discussed case with attending Physician. Agrees with care and will D/C with follow up. Patient's results have been reviewed with them.   Patient and/or family have verbally conveyed their understanding and agreement of the patient's signs, symptoms, diagnosis, treatment and prognosis and additionally agree to follow up as recommended or return to the Emergency Room should their condition change prior to follow-up. Discharge instructions have also been provided to the patient with some educational information regarding their diagnosis as well a list of reasons why they would want to return to the ER prior to their follow-up appointment should their condition change.   BURKE Mcgregor

## 2020-09-08 ENCOUNTER — HOSPITAL ENCOUNTER (EMERGENCY)
Age: 30
Discharge: HOME | End: 2020-09-08
Attending: STUDENT IN AN ORGANIZED HEALTH CARE EDUCATION/TRAINING PROGRAM | Admitting: STUDENT IN AN ORGANIZED HEALTH CARE EDUCATION/TRAINING PROGRAM
Payer: MEDICAID

## 2020-09-08 VITALS
BODY MASS INDEX: 27.34 KG/M2 | OXYGEN SATURATION: 99 % | DIASTOLIC BLOOD PRESSURE: 73 MMHG | SYSTOLIC BLOOD PRESSURE: 108 MMHG | TEMPERATURE: 98.4 F | WEIGHT: 140 LBS | RESPIRATION RATE: 16 BRPM | HEART RATE: 87 BPM

## 2020-09-08 DIAGNOSIS — O21.9 NAUSEA AND VOMITING DURING PREGNANCY: ICD-10-CM

## 2020-09-08 DIAGNOSIS — Z3A.01 LESS THAN 8 WEEKS GESTATION OF PREGNANCY: Primary | ICD-10-CM

## 2020-09-08 LAB
ALBUMIN SERPL-MCNC: 4.2 G/DL (ref 3.5–5)
ALBUMIN/GLOB SERPL: 1 {RATIO} (ref 1.1–2.2)
ALP SERPL-CCNC: 132 U/L (ref 45–117)
ALT SERPL-CCNC: 97 U/L (ref 12–78)
ANION GAP SERPL CALC-SCNC: 8 MMOL/L (ref 5–15)
APPEARANCE UR: CLEAR
AST SERPL-CCNC: 49 U/L (ref 15–37)
BACTERIA URNS QL MICRO: ABNORMAL /HPF
BASOPHILS # BLD: 0 K/UL (ref 0–0.1)
BASOPHILS NFR BLD: 0 % (ref 0–1)
BILIRUB SERPL-MCNC: 1 MG/DL (ref 0.2–1)
BILIRUB UR QL CFM: NEGATIVE
BUN SERPL-MCNC: 5 MG/DL (ref 6–20)
BUN/CREAT SERPL: 10 (ref 12–20)
CALCIUM SERPL-MCNC: 9.6 MG/DL (ref 8.5–10.1)
CHLORIDE SERPL-SCNC: 101 MMOL/L (ref 97–108)
CO2 SERPL-SCNC: 27 MMOL/L (ref 21–32)
COLOR UR: ABNORMAL
COMMENT, HOLDF: NORMAL
CREAT SERPL-MCNC: 0.48 MG/DL (ref 0.55–1.02)
DIFFERENTIAL METHOD BLD: NORMAL
EOSINOPHIL # BLD: 0.1 K/UL (ref 0–0.4)
EOSINOPHIL NFR BLD: 2 % (ref 0–7)
EPITH CASTS URNS QL MICRO: ABNORMAL /LPF
ERYTHROCYTE [DISTWIDTH] IN BLOOD BY AUTOMATED COUNT: 11.7 % (ref 11.5–14.5)
GLOBULIN SER CALC-MCNC: 4.2 G/DL (ref 2–4)
GLUCOSE SERPL-MCNC: 76 MG/DL (ref 65–100)
GLUCOSE UR STRIP.AUTO-MCNC: NEGATIVE MG/DL
HCT VFR BLD AUTO: 40.9 % (ref 35–47)
HGB BLD-MCNC: 14.1 G/DL (ref 11.5–16)
HGB UR QL STRIP: ABNORMAL
IMM GRANULOCYTES # BLD AUTO: 0 K/UL (ref 0–0.04)
IMM GRANULOCYTES NFR BLD AUTO: 0 % (ref 0–0.5)
KETONES UR QL STRIP.AUTO: >80 MG/DL
LEUKOCYTE ESTERASE UR QL STRIP.AUTO: ABNORMAL
LYMPHOCYTES # BLD: 3 K/UL (ref 0.8–3.5)
LYMPHOCYTES NFR BLD: 40 % (ref 12–49)
MCH RBC QN AUTO: 30.9 PG (ref 26–34)
MCHC RBC AUTO-ENTMCNC: 34.5 G/DL (ref 30–36.5)
MCV RBC AUTO: 89.5 FL (ref 80–99)
MONOCYTES # BLD: 0.5 K/UL (ref 0–1)
MONOCYTES NFR BLD: 6 % (ref 5–13)
MUCOUS THREADS URNS QL MICRO: ABNORMAL /LPF
NEUTS SEG # BLD: 3.8 K/UL (ref 1.8–8)
NEUTS SEG NFR BLD: 52 % (ref 32–75)
NITRITE UR QL STRIP.AUTO: NEGATIVE
NRBC # BLD: 0 K/UL (ref 0–0.01)
NRBC BLD-RTO: 0 PER 100 WBC
PH UR STRIP: 6.5 [PH] (ref 5–8)
PLATELET # BLD AUTO: 238 K/UL (ref 150–400)
PMV BLD AUTO: 10.5 FL (ref 8.9–12.9)
POTASSIUM SERPL-SCNC: 3.4 MMOL/L (ref 3.5–5.1)
PROT SERPL-MCNC: 8.4 G/DL (ref 6.4–8.2)
PROT UR STRIP-MCNC: ABNORMAL MG/DL
RBC # BLD AUTO: 4.57 M/UL (ref 3.8–5.2)
RBC #/AREA URNS HPF: ABNORMAL /HPF (ref 0–5)
SAMPLES BEING HELD,HOLD: NORMAL
SODIUM SERPL-SCNC: 136 MMOL/L (ref 136–145)
SP GR UR REFRACTOMETRY: 1.03 (ref 1–1.03)
UROBILINOGEN UR QL STRIP.AUTO: 2 EU/DL (ref 0.2–1)
WBC # BLD AUTO: 7.5 K/UL (ref 3.6–11)
WBC URNS QL MICRO: ABNORMAL /HPF (ref 0–4)

## 2020-09-08 PROCEDURE — 36415 COLL VENOUS BLD VENIPUNCTURE: CPT

## 2020-09-08 PROCEDURE — 74011250636 HC RX REV CODE- 250/636: Performed by: PHYSICIAN ASSISTANT

## 2020-09-08 PROCEDURE — 87086 URINE CULTURE/COLONY COUNT: CPT

## 2020-09-08 PROCEDURE — 99282 EMERGENCY DEPT VISIT SF MDM: CPT

## 2020-09-08 PROCEDURE — 80053 COMPREHEN METABOLIC PANEL: CPT

## 2020-09-08 PROCEDURE — 81001 URINALYSIS AUTO W/SCOPE: CPT

## 2020-09-08 PROCEDURE — 85025 COMPLETE CBC W/AUTO DIFF WBC: CPT

## 2020-09-08 PROCEDURE — 96360 HYDRATION IV INFUSION INIT: CPT

## 2020-09-08 RX ORDER — MULTIVIT,IRON,MINERALS/LUTEIN
1 TABLET ORAL DAILY
Qty: 30 TAB | Refills: 0 | Status: SHIPPED | OUTPATIENT
Start: 2020-09-08 | End: 2020-10-08

## 2020-09-08 RX ADMIN — SODIUM CHLORIDE 1000 ML: 9 INJECTION, SOLUTION INTRAVENOUS at 20:20

## 2020-09-08 NOTE — ED TRIAGE NOTES
Pt referred from [facility] for abnormal Creatine and BUN. LMP 7/18/2020.   Confirmed pregnancy at [facility].

## 2020-09-08 NOTE — ED PROVIDER NOTES
Date of Service:  (Not on file)    Patient:  Alem Drew    Chief Complaint:  Abnormal Lab Results       HPI:  Alem Drew is a  27 y.o.  female who presents for evaluation of evaluation from [facility] for abnormal creatinine result. [employer]  833011 used. Pregnant - last menses was 2020. Urine is dark, denies abdominal pain, denies vaginal bleeding, denies pain with urination. Also endorses vomiting and has difficulty tolerating food/water. Sent for evalution from [facility] for a creatinine of 0.3. Past Medical History:   Diagnosis Date    Gastritis        History reviewed. No pertinent surgical history. History reviewed. No pertinent family history. Social History     Socioeconomic History    Marital status:      Spouse name: Not on file    Number of children: Not on file    Years of education: Not on file    Highest education level: Not on file   Occupational History    Not on file   Social Needs    Financial resource strain: Not on file    Food insecurity     Worry: Not on file     Inability: Not on file    Transportation needs     Medical: Not on file     Non-medical: Not on file   Tobacco Use    Smoking status: Never Smoker    Smokeless tobacco: Never Used   Substance and Sexual Activity    Alcohol use: Yes     Comment:  Occ    Drug use: Not on file    Sexual activity: Not on file   Lifestyle    Physical activity     Days per week: Not on file     Minutes per session: Not on file    Stress: Not on file   Relationships    Social connections     Talks on phone: Not on file     Gets together: Not on file     Attends Restoration service: Not on file     Active member of club or organization: Not on file     Attends meetings of clubs or organizations: Not on file     Relationship status: Not on file    Intimate partner violence     Fear of current or ex partner: Not on file     Emotionally abused: Not on file     Physically abused: Not on file     Forced sexual activity: Not on file   Other Topics Concern    Not on file   Social History Narrative    Not on file         ALLERGIES: Patient has no known allergies. Review of Systems   Constitutional: Negative for chills and fever. HENT: Negative for congestion and sore throat. Eyes: Negative for pain. Respiratory: Negative for cough and shortness of breath. Cardiovascular: Negative for chest pain and palpitations. Gastrointestinal: Negative for abdominal pain, diarrhea, nausea and vomiting. Genitourinary: Negative for dysuria, frequency and urgency. Musculoskeletal: Negative for back pain and neck pain. Neurological: Negative for dizziness, light-headedness and headaches. Vitals:    09/08/20 1856   BP: 108/73   Pulse: 87   Resp: 16   Temp: 98.4 °F (36.9 °C)   SpO2: 99%   Weight: 63.5 kg (140 lb)            Physical Exam  Vitals signs and nursing note reviewed. Constitutional:       Appearance: Normal appearance. HENT:      Head: Normocephalic and atraumatic. Nose: Nose normal.   Eyes:      Extraocular Movements: Extraocular movements intact. Conjunctiva/sclera: Conjunctivae normal.      Pupils: Pupils are equal, round, and reactive to light. Neck:      Musculoskeletal: Normal range of motion and neck supple. Cardiovascular:      Rate and Rhythm: Normal rate and regular rhythm. Pulses: Normal pulses. Radial pulses are 2+ on the right side and 2+ on the left side. Posterior tibial pulses are 2+ on the right side and 2+ on the left side. Heart sounds: Normal heart sounds. Pulmonary:      Effort: Pulmonary effort is normal.      Breath sounds: Normal breath sounds. Abdominal:      General: Abdomen is flat. Bowel sounds are normal.      Palpations: Abdomen is soft. Tenderness: There is no abdominal tenderness. There is no right CVA tenderness, left CVA tenderness, guarding or rebound. Musculoskeletal: Normal range of motion. Skin:     General: Skin is warm and dry. Capillary Refill: Capillary refill takes less than 2 seconds. Neurological:      General: No focal deficit present. Mental Status: She is alert and oriented to person, place, and time. Mental status is at baseline. Sensory: Sensation is intact. Motor: Motor function is intact. Coordination: Coordination is intact. Psychiatric:         Mood and Affect: Mood normal.         Behavior: Behavior normal.         Thought Content:  Thought content normal.          MDM  Number of Diagnoses or Management Options  Less than 8 weeks gestation of pregnancy:   Nausea and vomiting during pregnancy:   Diagnosis management comments: Plan: CBC, CMP, UA, iv fluids, anticipate discharge home    LABS:  Recent Results (from the past 6 hour(s))  -CBC WITH AUTOMATED DIFF  Collection Time: 09/08/20  7:27 PM       Result                      Value             Ref Range           WBC                         7.5               3.6 - 11.0 K*       RBC                         4.57              3.80 - 5.20 *       HGB                         14.1              11.5 - 16.0 *       HCT                         40.9              35.0 - 47.0 %       MCV                         89.5              80.0 - 99.0 *       MCH                         30.9              26.0 - 34.0 *       MCHC                        34.5              30.0 - 36.5 *       RDW                         11.7              11.5 - 14.5 %       PLATELET                    238               150 - 400 K/*       MPV                         10.5              8.9 - 12.9 FL       NRBC                        0.0               0  WBC       ABSOLUTE NRBC               0.00              0.00 - 0.01 *       NEUTROPHILS                 52                32 - 75 %           LYMPHOCYTES                 40                12 - 49 %           MONOCYTES                   6                 5 - 13 %            EOSINOPHILS 2                 0 - 7 %             BASOPHILS                   0                 0 - 1 %             IMMATURE GRANULOCYTES       0                 0.0 - 0.5 %         ABS. NEUTROPHILS            3.8               1.8 - 8.0 K/*       ABS. LYMPHOCYTES            3.0               0.8 - 3.5 K/*       ABS. MONOCYTES              0.5               0.0 - 1.0 K/*       ABS. EOSINOPHILS            0.1               0.0 - 0.4 K/*       ABS. BASOPHILS              0.0               0.0 - 0.1 K/*       ABS. IMM. GRANS.            0.0               0.00 - 0.04 *       DF                          AUTOMATED                        -METABOLIC PANEL, COMPREHENSIVE  Collection Time: 09/08/20  7:27 PM       Result                      Value             Ref Range           Sodium                      136               136 - 145 mm*       Potassium                   3.4 (L)           3.5 - 5.1 mm*       Chloride                    101               97 - 108 mmo*       CO2                         27                21 - 32 mmol*       Anion gap                   8                 5 - 15 mmol/L       Glucose                     76                65 - 100 mg/*       BUN                         5 (L)             6 - 20 MG/DL        Creatinine                  0.48 (L)          0.55 - 1.02 *       BUN/Creatinine ratio        10 (L)            12 - 20             GFR est AA                  >60               >60 ml/min/1*       GFR est non-AA              >60               >60 ml/min/1*       Calcium                     9.6               8.5 - 10.1 M*       Bilirubin, total            1.0               0.2 - 1.0 MG*       ALT (SGPT)                  97 (H)            12 - 78 U/L         AST (SGOT)                  49 (H)            15 - 37 U/L         Alk.  phosphatase            132 (H)           45 - 117 U/L        Protein, total              8.4 (H)           6.4 - 8.2 g/*       Albumin                     4.2               3.5 - 5.0 g/*       Globulin                    4.2 (H)           2.0 - 4.0 g/*       A-G Ratio                   1.0 (L)           1.1 - 2.2      -SAMPLES BEING HELD  Collection Time: 09/08/20  7:27 PM       Result                      Value             Ref Range           SAMPLES BEING HELD          1RED,1BLU                             COMMENT                                                       Add-on orders for these samples will be processed based on acceptable specimen integrity and analyte stability, which may vary by analyte.   -URINALYSIS W/ RFLX MICROSCOPIC  Collection Time: 09/08/20  9:09 PM       Result                      Value             Ref Range           Color                       DARK YELLOW                           Appearance                  CLEAR             CLEAR               Specific gravity            1.029             1.003 - 1.03*       pH (UA)                     6.5               5.0 - 8.0           Protein                     TRACE (A)         NEG mg/dL           Glucose                     Negative          NEG mg/dL           Ketone                      >80 (A)           NEG mg/dL           Blood                       SMALL (A)         NEG                 Urobilinogen                2.0 (H)           0.2 - 1.0 EU*       Nitrites                    Negative          NEG                 Leukocyte Esterase          TRACE (A)         NEG                 WBC                         0-4               0 - 4 /hpf          RBC                         0-5               0 - 5 /hpf          Epithelial cells            MANY (A)          FEW /lpf            Bacteria                    2+ (A)            NEG /hpf            Mucus                       TRACE (A)         NEG /lpf       -BILIRUBIN, CONFIRM  Collection Time: 09/08/20  9:09 PM       Result                      Value             Ref Range           Bilirubin UA, confirm       Negative          NEG               Medications During Visit:  Medications  sodium chloride 0.9 % bolus infusion 1,000 mL (0 mL IntraVENous IV Completed 9/8/20 2120)      DECISION MAKING:  Filippo Montgomery is a 27 y.o. female who comes in as above. Currently pregnant, last menses 7/18/2020, [facility] by [facility] for Creatinine of 0.3. Also endorses dark urine. No abdominal pain, no vaginal bleeding, no dysuria, no additional symptoms/concerns. Vitals stable. No abdominal tenderness. Blood work and urine completed, creatitine 0.48, discussed these results with patient. Will send urine for culture. Please follow up with obgyn within 1 week, referral given. Return to ED if any worsening or severe symptoms or if any abdominal pain, vaginal bleeding. IMPRESSION:  Less than 8 weeks gestation of pregnancy  (primary encounter diagnosis)  Nausea and vomiting during pregnancy    DISPOSITION:  Discharged      Discharge Medication List as of 9/8/2020 10:33 PM         Follow-up Information     Follow up With Specialties Details Why Contact Info    Ronan Horton MD Obstetrics & Gynecology, Gynecology, Obstetrics Schedule   an appointment as soon as possible for a visit in 1 week  [place] [address]  [phone]      [place] [address] [facility] Emergency Medicine Go to  If symptoms worsen [address] [place] [address] [address] [place] [address] Obstetrics & Gynecology Schedule an   appointment as soon as possible for a visit in 1 week  Hiram Garrison  [address] [place] [address]  [address] [place] [address]  [phone]          The patient is asked to follow-up with their primary care provider in the next several days. They are to call tomorrow for an appointment. The patient is asked to return promptly for any increased concerns or worsening of symptoms. They can return to this emergency department or any other emergency department.          Amount and/or Complexity of Data Reviewed  Decide to obtain previous medical records or to obtain history from someone other than the patient: yes           Procedures

## 2020-09-09 LAB
BACTERIA SPEC CULT: NORMAL
SERVICE CMNT-IMP: NORMAL

## 2020-09-09 NOTE — DISCHARGE INSTRUCTIONS
Increase fluid intake, frequent snacks high protein/high carb, vasquez for nausea. May take unisom nightly for prevention of nausea.

## 2020-09-30 ENCOUNTER — APPOINTMENT (OUTPATIENT)
Dept: ULTRASOUND IMAGING | Age: 30
End: 2020-09-30
Attending: EMERGENCY MEDICINE
Payer: MEDICAID

## 2020-09-30 ENCOUNTER — HOSPITAL ENCOUNTER (EMERGENCY)
Age: 30
Discharge: HOME | End: 2020-09-30
Attending: EMERGENCY MEDICINE | Admitting: EMERGENCY MEDICINE
Payer: MEDICAID

## 2020-09-30 VITALS
RESPIRATION RATE: 16 BRPM | TEMPERATURE: 97.9 F | DIASTOLIC BLOOD PRESSURE: 62 MMHG | HEART RATE: 76 BPM | OXYGEN SATURATION: 99 % | WEIGHT: 140 LBS | SYSTOLIC BLOOD PRESSURE: 98 MMHG | BODY MASS INDEX: 27.48 KG/M2 | HEIGHT: 60 IN

## 2020-09-30 DIAGNOSIS — E87.6 HYPOKALEMIA: ICD-10-CM

## 2020-09-30 DIAGNOSIS — O46.90 VAGINAL BLEEDING IN PREGNANCY, UNSPECIFIED TRIMESTER: Primary | ICD-10-CM

## 2020-09-30 LAB
ABO + RH BLD: NORMAL
ALBUMIN SERPL-MCNC: 3.8 G/DL (ref 3.5–5)
ALBUMIN/GLOB SERPL: 1 {RATIO} (ref 1.1–2.2)
ALP SERPL-CCNC: 78 U/L (ref 45–117)
ALT SERPL-CCNC: 44 U/L (ref 12–78)
ANION GAP SERPL CALC-SCNC: 11 MMOL/L (ref 5–15)
APPEARANCE UR: CLEAR
AST SERPL-CCNC: 23 U/L (ref 15–37)
BACTERIA URNS QL MICRO: ABNORMAL /HPF
BASOPHILS # BLD: 0 K/UL (ref 0–0.1)
BASOPHILS NFR BLD: 0 % (ref 0–1)
BILIRUB SERPL-MCNC: 0.6 MG/DL (ref 0.2–1)
BILIRUB UR QL CFM: NEGATIVE
BUN SERPL-MCNC: 6 MG/DL (ref 6–20)
BUN/CREAT SERPL: 13 (ref 12–20)
CALCIUM SERPL-MCNC: 9.2 MG/DL (ref 8.5–10.1)
CHLORIDE SERPL-SCNC: 103 MMOL/L (ref 97–108)
CO2 SERPL-SCNC: 23 MMOL/L (ref 21–32)
COLOR UR: ABNORMAL
COMMENT, HOLDF: NORMAL
CREAT SERPL-MCNC: 0.45 MG/DL (ref 0.55–1.02)
DIFFERENTIAL METHOD BLD: NORMAL
EOSINOPHIL # BLD: 0.1 K/UL (ref 0–0.4)
EOSINOPHIL NFR BLD: 2 % (ref 0–7)
EPITH CASTS URNS QL MICRO: ABNORMAL /LPF
ERYTHROCYTE [DISTWIDTH] IN BLOOD BY AUTOMATED COUNT: 12.3 % (ref 11.5–14.5)
GLOBULIN SER CALC-MCNC: 3.9 G/DL (ref 2–4)
GLUCOSE SERPL-MCNC: 73 MG/DL (ref 65–100)
GLUCOSE UR STRIP.AUTO-MCNC: NEGATIVE MG/DL
HCG SERPL-ACNC: ABNORMAL MIU/ML (ref 0–6)
HCT VFR BLD AUTO: 36.3 % (ref 35–47)
HGB BLD-MCNC: 13.1 G/DL (ref 11.5–16)
HGB UR QL STRIP: ABNORMAL
HYALINE CASTS URNS QL MICRO: ABNORMAL /LPF (ref 0–5)
IMM GRANULOCYTES # BLD AUTO: 0 K/UL (ref 0–0.04)
IMM GRANULOCYTES NFR BLD AUTO: 0 % (ref 0–0.5)
KETONES UR QL STRIP.AUTO: >80 MG/DL
LEUKOCYTE ESTERASE UR QL STRIP.AUTO: ABNORMAL
LYMPHOCYTES # BLD: 2.2 K/UL (ref 0.8–3.5)
LYMPHOCYTES NFR BLD: 36 % (ref 12–49)
MCH RBC QN AUTO: 31.9 PG (ref 26–34)
MCHC RBC AUTO-ENTMCNC: 36.1 G/DL (ref 30–36.5)
MCV RBC AUTO: 88.3 FL (ref 80–99)
MONOCYTES # BLD: 0.4 K/UL (ref 0–1)
MONOCYTES NFR BLD: 7 % (ref 5–13)
NEUTS SEG # BLD: 3.3 K/UL (ref 1.8–8)
NEUTS SEG NFR BLD: 55 % (ref 32–75)
NITRITE UR QL STRIP.AUTO: NEGATIVE
NRBC # BLD: 0 K/UL (ref 0–0.01)
NRBC BLD-RTO: 0 PER 100 WBC
PH UR STRIP: 6 [PH] (ref 5–8)
PLATELET # BLD AUTO: 198 K/UL (ref 150–400)
PMV BLD AUTO: 10.3 FL (ref 8.9–12.9)
POTASSIUM SERPL-SCNC: 3.1 MMOL/L (ref 3.5–5.1)
PROT SERPL-MCNC: 7.7 G/DL (ref 6.4–8.2)
PROT UR STRIP-MCNC: ABNORMAL MG/DL
RBC # BLD AUTO: 4.11 M/UL (ref 3.8–5.2)
RBC #/AREA URNS HPF: ABNORMAL /HPF (ref 0–5)
SAMPLES BEING HELD,HOLD: NORMAL
SODIUM SERPL-SCNC: 137 MMOL/L (ref 136–145)
SP GR UR REFRACTOMETRY: 1.03 (ref 1–1.03)
UR CULT HOLD, URHOLD: NORMAL
UROBILINOGEN UR QL STRIP.AUTO: 1 EU/DL (ref 0.2–1)
WBC # BLD AUTO: 6.1 K/UL (ref 3.6–11)
WBC URNS QL MICRO: ABNORMAL /HPF (ref 0–4)

## 2020-09-30 PROCEDURE — 74011250637 HC RX REV CODE- 250/637: Performed by: EMERGENCY MEDICINE

## 2020-09-30 PROCEDURE — 76817 TRANSVAGINAL US OBSTETRIC: CPT

## 2020-09-30 PROCEDURE — 74011250636 HC RX REV CODE- 250/636: Performed by: EMERGENCY MEDICINE

## 2020-09-30 PROCEDURE — 85025 COMPLETE CBC W/AUTO DIFF WBC: CPT

## 2020-09-30 PROCEDURE — 80053 COMPREHEN METABOLIC PANEL: CPT

## 2020-09-30 PROCEDURE — 84702 CHORIONIC GONADOTROPIN TEST: CPT

## 2020-09-30 PROCEDURE — 86900 BLOOD TYPING SEROLOGIC ABO: CPT

## 2020-09-30 PROCEDURE — 76801 OB US < 14 WKS SINGLE FETUS: CPT

## 2020-09-30 PROCEDURE — 99284 EMERGENCY DEPT VISIT MOD MDM: CPT

## 2020-09-30 PROCEDURE — 36415 COLL VENOUS BLD VENIPUNCTURE: CPT

## 2020-09-30 PROCEDURE — 81001 URINALYSIS AUTO W/SCOPE: CPT

## 2020-09-30 RX ORDER — POTASSIUM CHLORIDE 750 MG/1
40 TABLET, FILM COATED, EXTENDED RELEASE ORAL
Status: COMPLETED | OUTPATIENT
Start: 2020-09-30 | End: 2020-09-30

## 2020-09-30 RX ADMIN — SODIUM CHLORIDE 1000 ML: 9 INJECTION, SOLUTION INTRAVENOUS at 12:23

## 2020-09-30 RX ADMIN — POTASSIUM CHLORIDE 40 MEQ: 750 TABLET, FILM COATED, EXTENDED RELEASE ORAL at 12:24

## 2020-09-30 NOTE — DISCHARGE INSTRUCTIONS
Patient Education   Patient Education        Hipocalemia: Instrucciones de cuidado  Hypokalemia: Care Instructions  Instrucciones de cuidado    La hipocalemia es un nivel bajo de potasio. El corazón, los músculos, los riñones y el sistema nervioso necesitan potasio para funcionar correctamente. Taisha problema tiene muchas [employer]. Los problemas renales, la alimentación y los medicamentos shelia diuréticos y laxantes pueden causarla. [address] En algunos casos, el cáncer es la causa. Adair médico puede hacerle pruebas para determinar la causa de alecia niveles bajos de Chris. Es posible que necesite medicamentos para hacer que alecia niveles de potasio vuelvan a la normalidad. También podría tener que hacerse análisis de stanton con regularidad para revisar el potasio. Si tiene un nivel de potasio muy bajo, podría necesitar medicamentos por vía intravenosa (IV). También podría necesitar pruebas para revisar la actividad eléctrica de adair corazón. Los problemas del corazón causados por los bajos niveles de potasio pueden ser [employer]. La atención de seguimiento es apurva parte clave de adair tratamiento y seguridad. Asegúrese de hacer y acudir a todas las citas, y llame a adair médico si está teniendo problemas. También es apurva buena idea saber los resultados de alecia exámenes y mantener apurva lista de los medicamentos que shannon. ¿Cómo puede cuidarse en el hogar? · Si el médico lo recomienda, consuma alimentos que tengan [employer]. Estos incluyen frutas frescas, jugos y verduras. [address], los frijoles (habichuelas) y [address]. · Sea ankit con los medicamentos. Si adair médico le receta medicamentos o suplementos de potasio, tómelos según las indicaciones. Llame a adair médico si tiene algún problema con los medicamentos. · Hágase análisis para revisar alecia niveles de potasio con la frecuencia que le indique el médico.  ¿Cuándo debe pedir ayuda?    Llame al 911 en cualquier momento que considere que necesita atención de [place]. Por ejemplo, llame si:    · Siente que adair corazón omite latidos. Los problemas del corazón causados por los bajos niveles de potasio pueden provocar la muerte.     · Se desmayó (perdió el conocimiento).     · Tiene un episodio de convulsiones. Llame a adair médico ahora mismo o busque atención médica inmediata si:    · Se siente débil o inusualmente cansado.     · Tiene fred calambres en los brazos o las piernas.     · Tiene hormigueo o entumecimiento.     · Siente el estómago revuelto, o vomita.     · Tiene retortijones abdominales.     · Está abotagado o estreñido.     · Necesita orinar con mucha frecuencia.     · Tiene mucha sed la mayor parte del tiempo.     · Siente mareos o aturdimiento, o que está a punto de desmayarse.     · Se siente deprimido o pierde el contacto con la realidad. Preste especial atención a los cambios en adair honorio y asegúrese de comunicarse con adair médico si:    · No mejora shelia se esperaba. ¿Dónde puede encontrar más información en inglés? Vaya a http://www.gray.com/  Andrew [id number] en la búsqueda para aprender más acerca de \"Hipocalemia: Instrucciones de cuidado. \"  Revisado: 31 marzo, 2020               Versión del contenido: 12.6  © 3513-8924 Healthwise, Incorporated. Las instrucciones de cuidado fueron adaptadas bajo licencia por [employer] (which disclaims liability or warranty for this information). Si usted tiene [place] afección médica o sobre estas instrucciones, siempre pregunte a adair profesional de honorio. Healthwise, Incorporated niega toda garantía o responsabilidad por adair uso de esta información. Sangrado vaginal zoe el embarazo: Instrucciones de cuidado  Vaginal Bleeding During Pregnancy: Care Instructions  Instrucciones de cuidado    Muchas mujeres tienen algo de sangrado vaginal zoe el embarazo. En algunos casos, el sangrado no es grave.  Y no tendrá ningún otro problema con adair embarazo. Sheryl en ocasiones, el sangrado es señal de un problema más grave. [place] es más común si el sangrado es intenso o doloroso. Soo Darren de problemas más graves son un aborto espontáneo, un embarazo ectópico o un problema con la placenta. Es posible que tenga que twin a adair médico otra vez para asegurarse de que todo esté hemal. Demarco vez necesite, además, más pruebas para determinar la causa del sangrado. Para algunas mujeres, el tratamiento en el hogar es todo lo que se requiere. Sheryl esto depende de la causa del sangrado. Asegúrese de informar a adair médico si tiene síntomas nuevos o si los síntomas empeoran. El médico la ha examinado minuciosamente, sheryl pueden producirse problemas más tarde. Si nota algún problema o nuevos síntomas, busque tratamiento médico de inmediato. La atención de seguimiento es apurva parte clave de adair tratamiento y seguridad. Asegúrese de hacer y acudir a todas las citas, y llame a adair médico si está teniendo problemas. También es apurva buena idea saber los resultados de alecia exámenes y mantener apurva lista de los medicamentos que shannon. ¿Cómo puede cuidarse en el hogar? · Si adair médico le recetó medicamentos, tómelos exactamente [employer] indicados. Llame a adair médico si piensa que está teniendo un problema con adair medicamento. · No tenga relaciones sexuales hasta que cese el sangrado y adair médico diga que puede [place]. · Consulte a adair médico acerca de otras actividades que usted puede o no puede hacer. · Descanse bastante. [employer] que se sienta cansada. · Siga apurva dieta saludable. Sp B[place] (chícharos), frijoles y verduras de hoja [place] en abundancia. Hable con un dietista si necesita ayuda para planificar adair dieta. · No use medicamentos antiinflamatorios no esteroideos (OCTAVIO), tales shelia ibuprofeno (Advil, Motrin), naproxeno (Aleve) o aspirina, a menos que adair médico lo apruebe. ¿Cuándo debe pedir ayuda?    Emelyame al 911 en cualquier momento que considere que necesita atención de [place]. Por ejemplo, llame si:    · Tiene dolor repentino e intenso en el abdomen.     · Se desmayó (perdió el conocimiento).     · Tiene sangrado vaginal intenso. Llame a adair médico ahora mismo o busque atención médica inmediata si:    · Siente mareos o aturdimiento, o que está a punto de desmayarse.     · Tiene nuevo o peor dolor en el abdomen o la pelvis.     · Adair sangrado vaginal está empeorando.     · Tiene dolor que ha aumentado en la asif vaginal.     · Tiene fiebre. Vigile de cerca los cambios en adair honorio y asegúrese de comunicarse con adair médico si:    · Tiene secreción vaginal nueva o peor.     · No mejora shelia se esperaba. ¿Dónde puede encontrar más información en inglés? Sandra Chan a http://www.gray.com/  Andrew Q501 en la búsqueda para aprender más acerca de \"Sangrado vaginal zoe el embarazo: Instrucciones de cuidado. \"  Revisado: 11 febrero, 2020               Versión del contenido: 12.6  © 9680-2414 Healthwise, Incorporated. Las instrucciones de cuidado fueron adaptadas bajo licencia por [employer] (which disclaims liability or warranty for this information). Si usted tiene [place] afección médica o sobre estas instrucciones, siempre pregunte a adair profesional de honorio. Healthwise, Incorporated niega toda garantía o responsabilidad por adair uso de esta información.

## 2020-09-30 NOTE — ED PROVIDER NOTES
HPI   Patient is a 26-year-old pregnant  female who presents to the ED with reports of lower abdominal cramping and light spotting since early this morning. She has not seen an OB/GYN doctor for this pregnancy. She states she did a home pregnancy test and feels she is about 3 months pregnant. ([id number]). Denies fever, cough, cold symptoms,headache, neck pain, visual changes, focal weakness or rash. Denies any  difficulty breathing, difficulty swallowing, SOB or chest pain. Denies any nausea, vomiting or diarrhea. Pt. Reports that she has not any food or medications today prior to arrival.  Language line employed. Past Medical History:   Diagnosis Date    Gastritis        History reviewed. No pertinent surgical history. History reviewed. No pertinent family history. Social History     Socioeconomic History    Marital status:      Spouse name: Not on file    Number of children: Not on file    Years of education: Not on file    Highest education level: Not on file   Occupational History    Not on file   Social Needs    Financial resource strain: Not on file    Food insecurity     Worry: Not on file     Inability: Not on file    Transportation needs     Medical: Not on file     Non-medical: Not on file   Tobacco Use    Smoking status: Never Smoker    Smokeless tobacco: Never Used   Substance and Sexual Activity    Alcohol use: Yes     Comment:  Occ    Drug use: Not on file    Sexual activity: Not on file   Lifestyle    Physical activity     Days per week: Not on file     Minutes per session: Not on file    Stress: Not on file   Relationships    Social connections     Talks on phone: Not on file     Gets together: Not on file     Attends Taoist service: Not on file     Active member of club or organization: Not on file     Attends meetings of clubs or organizations: Not on file     Relationship status: Not on file    Intimate partner violence     Fear of current or ex partner: Not on file     Emotionally abused: Not on file     Physically abused: Not on file     Forced sexual activity: Not on file   Other Topics Concern    Not on file   Social History Narrative    Not on file         ALLERGIES: Patient has no known allergies. Review of Systems   Constitutional: Negative for activity change, appetite change, fever and unexpected weight change. HENT: Negative for congestion, ear pain, rhinorrhea and trouble swallowing. Eyes: Negative for visual disturbance. Respiratory: Negative for cough and shortness of breath. Cardiovascular: Negative for chest pain, palpitations and leg swelling. Gastrointestinal: Negative for abdominal pain, constipation, diarrhea, nausea and vomiting. Genitourinary: Positive for pelvic pain and vaginal bleeding. Negative for dysuria. Musculoskeletal: Negative for arthralgias, back pain, myalgias and neck pain. Skin: Negative for rash. Neurological: Negative for dizziness, light-headedness and headaches. All other systems reviewed and are negative. Vitals:    09/30/20 0927   BP: 120/75   Pulse: (!) 111   Resp: 16   Temp: 98.2 °F (36.8 °C)   SpO2: 99%   Weight: 63.5 kg (140 lb)   Height: 5' (1.524 m)            Physical Exam  Vitals signs and nursing note reviewed. Constitutional:       General: She is not in acute distress. Appearance: Normal appearance. She is not ill-appearing, toxic-appearing or diaphoretic. Comments:  female; non-smoker, ; [place]:      Head: Normocephalic. Right Ear: Tympanic membrane normal.      Left Ear: Tympanic membrane normal.      Nose: Nose normal.      Mouth/Throat:      Mouth: Mucous membranes are moist.      Pharynx: No posterior oropharyngeal erythema. Neck:      Musculoskeletal: Normal range of motion and neck supple. Cardiovascular:      Rate and Rhythm: Normal rate and regular rhythm.    Pulmonary:      Effort: Pulmonary effort is normal.      Breath sounds: Normal breath sounds. Abdominal:      General: Bowel sounds are normal.      Palpations: Abdomen is soft. Tenderness: There is no abdominal tenderness. There is no guarding or rebound. Genitourinary:     Comments: Pelvic exam: Normal labia, vulva without rash or lesions; no vaginal discharge or active bleeding;  cervix is closed. Musculoskeletal: Normal range of motion. Skin:     General: Skin is warm and dry. Findings: No rash. Neurological:      General: No focal deficit present. Mental Status: She is alert and oriented to person, place, and time. Psychiatric:         Mood and Affect: Mood normal.         Behavior: Behavior normal.          MDM       Procedures      Us Uts Transvaginal Ob    Result Date: 9/30/2020  IMPRESSION: Single viable intrauterine pregnancy with estimated gestational age of 9 weeks 5 days. A small subchorionic hemorrhage measures 2.2 cm. Normal ovaries. Us Preg Uts < 14 Wks Sngl    Result Date: 9/30/2020  IMPRESSION: Single viable intrauterine pregnancy with estimated gestational age of 9 weeks 5 days. A small subchorionic hemorrhage measures 2.2 cm. Normal ovaries.      Labs Reviewed   URINALYSIS W/ RFLX MICROSCOPIC - Abnormal; Notable for the following components:       Result Value    Protein TRACE (*)     Ketone >80 (*)     Blood TRACE (*)     Leukocyte Esterase TRACE (*)     Epithelial cells MODERATE (*)     Bacteria 1+ (*)     All other components within normal limits   METABOLIC PANEL, COMPREHENSIVE - Abnormal; Notable for the following components:    Potassium 3.1 (*)     Creatinine 0.45 (*)     A-G Ratio 1.0 (*)     All other components within normal limits   BETA HCG, QT - Abnormal; Notable for the following components:    Beta HCG, ,026 (*)     All other components within normal limits   URINE CULTURE HOLD SAMPLE   CBC WITH AUTOMATED DIFF   SAMPLES BEING HELD   BILIRUBIN, CONFIRM   *UA&MICRO CHARGE BAT   BLOOD TYPE, (ABO+RH) Patient has been reexamined and denies any complaints of pain or discomfort. She was given referrals for close OB/GYN follow-up to start her prenatal care. She was encouraged to start taking prenatal vitamins daily. Patient's results and plan of care have been reviewed with her. Patient and/or family have verbally conveyed their understanding and agreement of the patient's signs, symptoms, diagnosis, treatment and prognosis and additionally agree to follow up as recommended or return to the Emergency Room should her condition change prior to follow-up. Discharge instructions have also been provided to the patient with some educational information regarding her diagnosis as well a list of reasons why she would want to return to the ER prior to her follow-up appointment should her condition change. Kera Dahl NP

## 2020-09-30 NOTE — ED TRIAGE NOTES
Using : Reports approx 3 months pregnant. Reports headache and lower abd pain since yesterday. Reports vaginal discharge began today, with blood tinge.

## 2020-10-30 LAB
ANTIBODY SCREEN, EXTERNAL: NEGATIVE
HCT, EXTERNAL: 38.2
HGB, EXTERNAL: 13
PLATELET CNT,   EXTERNAL: 215
RPR, EXTERNAL: NON REACTIVE
RUBELLA, EXTERNAL: NORMAL
TYPE, ABO & RH, EXTERNAL: NORMAL

## 2020-12-03 ENCOUNTER — INITIAL PRENATAL (OUTPATIENT)
Dept: OBGYN CLINIC | Age: 30
End: 2020-12-03
Payer: MEDICAID

## 2020-12-03 VITALS
DIASTOLIC BLOOD PRESSURE: 70 MMHG | SYSTOLIC BLOOD PRESSURE: 114 MMHG | BODY MASS INDEX: 28.39 KG/M2 | WEIGHT: 145.38 LBS

## 2020-12-03 DIAGNOSIS — Z34.80 SUPERVISION OF OTHER NORMAL PREGNANCY: Primary | ICD-10-CM

## 2020-12-03 DIAGNOSIS — Z23 ENCOUNTER FOR IMMUNIZATION: ICD-10-CM

## 2020-12-03 DIAGNOSIS — Z34.02 ENCOUNTER FOR SUPERVISION OF NORMAL FIRST PREGNANCY IN SECOND TRIMESTER: ICD-10-CM

## 2020-12-03 PROCEDURE — 90686 IIV4 VACC NO PRSV 0.5 ML IM: CPT | Performed by: OBSTETRICS & GYNECOLOGY

## 2020-12-03 PROCEDURE — 76805 OB US >/= 14 WKS SNGL FETUS: CPT | Performed by: OBSTETRICS & GYNECOLOGY

## 2020-12-03 PROCEDURE — 90471 IMMUNIZATION ADMIN: CPT | Performed by: OBSTETRICS & GYNECOLOGY

## 2020-12-03 PROCEDURE — 0502F SUBSEQUENT PRENATAL CARE: CPT | Performed by: OBSTETRICS & GYNECOLOGY

## 2020-12-03 NOTE — PROGRESS NOTES
Obstetrical Transfer Note    Ms. Chris Arellano is a No obstetric history on file. ,  27 y.o. female  No LMP recorded. .  She is transferring to our practice after receiving OB care at [address] No specialty comments available. EDC dating:  Her due date has been determined by LMP and first trimester US. Today's US confirms reassuring fetal surveillance and her established EDC. FETAL SURVEY  A SINGLE VIABLE IUP AT 19W4D IS SEEN. FETAL CARDIAC MOTION OBSERVED. FETAL ANATOMY WAS WELL VISUALIZED AND APPEARS WNL. NO ABNORMALITIES WERE SEEN ON TODAYS EXAM.  APPROPRIATE GROWTH MEASURED. SIZE = DATES. LIZTEH, PLACENTA AND CERVIX APPEAR WITHIN NORMAL LIMITS. GENDER: FEMALE    Her prenatal care up to this point has been notable for ASCUS pap smear can not rule out High grade    Relevant past pregnancy history:  She has the following pregnancy history: Her last pregnancy was  uncomplicated. She has no history of  delivery. Relevant past medical history:(relevant to this pregnancy): noncontributory. Pap/Occupational history:  Last pap smear: last year Results: Normal      Substance history: negative for alcohol, tobacco and street drugs. Positive for nothing. Exposure history: There is/are no indoor cat/s in the home. The patient was instructed to not change the cat litter. She admits close contact with children on a regular basis. She has had chicken pox or the vaccine in the past.   Patient denies issues with domestic violence. Genetic Screening/Teratology Counseling: (Includes patient, baby's father, or anyone in either family with:)  3.  Patient's age >/= 28 at EDC?--no  2.   Thalassemia ([facility], [facility], [address], or  background): MCV<80?--no.     3.  Neural tube defect (meningomyelocele, spina bifida, anencephaly)?--no.   4.  Congenital heart defect?--no.  5.  Down syndrome?--no.   6.  Mir-Sachs (Gnosticist, [place])?--no.   7.  Canavan's Disease?--no.   8.  Familial Dysautonomia?--no.   9.  Sickle cell disease or trait ()? --no   The patient has not been tested for sickle trait  10. Hemophilia or other blood disorders?--no. 11.  Muscular dystrophy?--no. 12.  Cystic fibrosis?--no. 13.  Oliver's Chorea?--no. 14.  Mental retardation/autism (if yes was person tested for Fragile X)?--no. 15.  Other inherited genetic or chromosomal disorder?--no. 12.  Maternal metabolic disorder (DM, PKU, etc)?--no. 17.  Patient or FOB with a child with a birth defect not listed above?--no.  17a. Patient or FOB with a birth defect themselves?--no. 18.  Recurrent pregnancy loss, or stillbirth?--no. 19.  Any medications since LMP other than prenatal vitamins (include vitamins, supplements, OTC meds, drugs, alcohol)?--no. 20.  Any other genetic/environmental exposure to discuss?--no. Infection History:  1. Lives with someone with TB or TB exposed?--no.   2.  Patient or partner has history of genital herpes?--no.  3.  Rash or viral illness since LMP?--no.    4.  History of STD (GC, CT, HPV, syphilis, HIV)? --no   5. Other: OTHER? Past Medical History:   Diagnosis Date    Gastritis      No past surgical history on file. Social History     Occupational History    Not on file   Tobacco Use    Smoking status: Never Smoker    Smokeless tobacco: Never Used   Substance and Sexual Activity    Alcohol use: Yes     Comment: Occ    Drug use: Not on file    Sexual activity: Not on file     No family history on file. OB History   No obstetric history on file. No Known Allergies  Prior to Admission medications    Medication Sig Start Date End Date Taking? Authorizing Provider   ciprofloxacin HCl (CILOXAN) 0.3 % ophthalmic solution Apply 1 Drop to eye four (4) times daily. 7/19/19   Janett Orellana MD   omeprazole (PRILOSEC) 40 mg capsule Take 1 Cap by mouth daily.  4/22/19   BURKE Vásquez   PNV#16-Iron Fum & PS-FA-OM-3 35-1-200 mg cap Take  by mouth. Ethel Medrano MD        Review of Systems: History obtained from the patient  Constitutional: negative for weight loss, fever, night sweats  HEENT: negative for hearing loss, earache, congestion, snoring, sore throat  CV: negative for chest pain, palpitations, edema  Resp: negative for cough, shortness of breath, wheezing  Breast: negative for breast lumps, nipple discharge, galactorrhea  GI: negative for change in bowel habits, abdominal pain, black or bloody stools  : negative for frequency, dysuria, hematuria, vaginal discharge  MSK: negative for back pain, joint pain, muscle pain  Skin: negative for itching, rash, hives  Neuro: negative for dizziness, headache, confusion, weakness  Psych: negative for anxiety, depression, change in mood  Heme/lymph: negative for bleeding, bruising, pallor    Objective: There were no vitals taken for this visit. Physical Exam:     Constitutional  · Appearance: well-nourished, well developed, alert, in no acute distress    HENT  · Head  · Face: appears normal  · Eyes: appear normal  · Ears: normal  · Mouth: normal  · Lips: no lesions      Chest  · Respiratory Effort: breathing unlabored     Cardiovascular  · Heart:  · Auscultation: regular rate and rhythm without murmur      Gastrointestinal  · Abdominal Examination: abdomen non-tender to palpation, normal bowel sounds, no masses present; FHTs present  · Liver and spleen: no hepatomegaly present, spleen not palpable  · Hernias: no hernias identified    Genitourinary  · deferred    Skin  · General Inspection: no rash, no lesions identified    Neurologic/Psychiatric  · Mental Status:  · Orientation: grossly oriented to person, place and time  · Mood and Affect: mood normal, affect appropriate    Assessment:   Intrauterine pregnancy with the following problems identified: none. Plan:   Patient and her significant other have been welcomed to our practice.   Collaborative care with MDs and CNMs have been reviewed; call coverage reviewed and welcoming folder reviewed. All questions have been answered. Patient declines presence of chaperone during today's visit. Handouts given to pt. Meeta Carrera 1       After obtaining consent, and per orders of Dr. Mena Galeas, injection of Flu Vaccine given by Elijah Frye MA. Patient instructed to remain in clinic for 20 minutes afterwards, and to report any adverse reaction to me immediately.     Flu Vaccine, Quadrivalent  : Partly  Site: [facility] Deltoid  Route: Intramuscular  Dose: 0.5ML  Lot#: [id number]  Exp date: 06/30/2021  NDC: 57778-219-74    Signed By: Filomena Laboy MD     December 3, 2020

## 2020-12-04 LAB
C TRACH RRNA SPEC QL NAA+PROBE: NEGATIVE
N GONORRHOEA RRNA SPEC QL NAA+PROBE: NEGATIVE
T VAGINALIS DNA SPEC QL NAA+PROBE: NEGATIVE

## 2020-12-05 LAB — BACTERIA UR CULT: NO GROWTH

## 2021-01-07 ENCOUNTER — OFFICE VISIT (OUTPATIENT)
Dept: OBGYN CLINIC | Age: 31
End: 2021-01-07
Payer: MEDICAID

## 2021-01-07 VITALS
WEIGHT: 152.25 LBS | SYSTOLIC BLOOD PRESSURE: 108 MMHG | DIASTOLIC BLOOD PRESSURE: 64 MMHG | HEIGHT: 60 IN | BODY MASS INDEX: 29.89 KG/M2

## 2021-01-07 DIAGNOSIS — Z34.80 SUPERVISION OF OTHER NORMAL PREGNANCY: Primary | ICD-10-CM

## 2021-01-07 DIAGNOSIS — R87.611 PAP SMEAR OF CERVIX WITH ASCUS, CANNOT EXCLUDE HGSIL: ICD-10-CM

## 2021-01-07 DIAGNOSIS — Z34.02 ENCOUNTER FOR SUPERVISION OF NORMAL FIRST PREGNANCY IN SECOND TRIMESTER: ICD-10-CM

## 2021-01-07 LAB
HBSAG, EXTERNAL: NEGATIVE
HIV, EXTERNAL: NON REACTIVE
T. PALLIDUM, EXTERNAL: NEGATIVE

## 2021-01-07 PROCEDURE — 57452 EXAM OF CERVIX W/SCOPE: CPT | Performed by: OBSTETRICS & GYNECOLOGY

## 2021-01-07 NOTE — PROGRESS NOTES
Colposcopy Procedure Note    Chart reviewed for the following:  I have reviewed the History & Physical and updated the Allergies for Arcelia Campos. Time Out performed immediately prior to start of procedure:  I have performed the following reviews on Arcelia Campos prior to the start of the procedure: Patient was identified by name and date of birth. Agreement on procedure being performed was verified. Risks and Benefits explained to the patient. Consent was signed and verified. Date of procedure: 2021  Time: 9:30am  Procedure performed by: Tim Christensen MD  How tolerated by patient: Well  Post Procedural Pain Scale: 0  Comments: None  FHR- 150s  Indications: Arcelia Campos is a 27 y.o.  female whose Patient's last menstrual period was 2020. Isadora Geller She presents for colposcopy for evaluation of a cervical abnormality with a pap smear abnormality consisting of ASCUS cannot rule out high grade. After being presented with the risks, benefits and alternatives has she signed a consent for the procedure. She states that she understands the need for the procedure and has no further questions. She was informed that she may experience discomfort. She is aware of the small potential for complications, including post-colposcopic vaginal bleeding, vaginal bleeding, vaginal leukorrhea or cervisitis/endometritis. Procedure: She was positioned in the dorsal lithotomy position and a speculum was inserted into the vagina. The cervix was visualized with and without the colposcope. Dilute acetic acid was applied to the cervix. Colposcopy was performed. The transformation zone was completely visualized. Biopsies were not taken from the cervix. Endocervical curettage was NOT performed. Findings: This colposcopy was satisfactory. Normal cervical epithelium was present diffusely. Post Procedure Status: The patient tolerated the procedure well with minimal discomfort.  She was observed for 5 minutes and discharged in stable condition. We discussed repeating colpo postpartum.     Signed By: Deena Varghese     January 7, 2021

## 2021-01-09 LAB
BASOPHILS # BLD AUTO: 0 X10E3/UL (ref 0–0.2)
BASOPHILS NFR BLD AUTO: 0 %
BLD GP AB SCN SERPL QL: NEGATIVE
EOSINOPHIL # BLD AUTO: 0.2 X10E3/UL (ref 0–0.4)
EOSINOPHIL NFR BLD AUTO: 2 %
ERYTHROCYTE [DISTWIDTH] IN BLOOD BY AUTOMATED COUNT: 12.6 % (ref 11.7–15.4)
GLUCOSE 1H P 50 G GLC PO SERPL-MCNC: 65 MG/DL (ref 65–139)
HBV SURFACE AG SERPL QL IA: NEGATIVE
HCT VFR BLD AUTO: 30.3 % (ref 34–46.6)
HGB BLD-MCNC: 10.3 G/DL (ref 11.1–15.9)
HIV 1+2 AB+HIV1 P24 AG SERPL QL IA: NON REACTIVE
IMM GRANULOCYTES # BLD AUTO: 0.1 X10E3/UL (ref 0–0.1)
IMM GRANULOCYTES NFR BLD AUTO: 1 %
LYMPHOCYTES # BLD AUTO: 2.2 X10E3/UL (ref 0.7–3.1)
LYMPHOCYTES NFR BLD AUTO: 27 %
MCH RBC QN AUTO: 30.5 PG (ref 26.6–33)
MCHC RBC AUTO-ENTMCNC: 34 G/DL (ref 31.5–35.7)
MCV RBC AUTO: 90 FL (ref 79–97)
MONOCYTES # BLD AUTO: 0.5 X10E3/UL (ref 0.1–0.9)
MONOCYTES NFR BLD AUTO: 6 %
NEUTROPHILS # BLD AUTO: 5 X10E3/UL (ref 1.4–7)
NEUTROPHILS NFR BLD AUTO: 64 %
PLATELET # BLD AUTO: 204 X10E3/UL (ref 150–450)
RBC # BLD AUTO: 3.38 X10E6/UL (ref 3.77–5.28)
TREPONEMA PALLIDUM IGG+IGM AB [PRESENCE] IN SERUM OR PLASMA BY IMMUNOASSAY: NON REACTIVE
WBC # BLD AUTO: 8 X10E3/UL (ref 3.4–10.8)

## 2021-02-04 ENCOUNTER — ROUTINE PRENATAL (OUTPATIENT)
Dept: OBGYN CLINIC | Age: 31
End: 2021-02-04
Payer: MEDICAID

## 2021-02-04 VITALS
WEIGHT: 160.25 LBS | HEIGHT: 60 IN | BODY MASS INDEX: 31.46 KG/M2 | DIASTOLIC BLOOD PRESSURE: 64 MMHG | SYSTOLIC BLOOD PRESSURE: 106 MMHG

## 2021-02-04 DIAGNOSIS — Z23 ENCOUNTER FOR IMMUNIZATION: Primary | ICD-10-CM

## 2021-02-04 DIAGNOSIS — Z34.80 SUPERVISION OF OTHER NORMAL PREGNANCY: ICD-10-CM

## 2021-02-04 DIAGNOSIS — Z34.02 ENCOUNTER FOR SUPERVISION OF NORMAL FIRST PREGNANCY IN SECOND TRIMESTER: ICD-10-CM

## 2021-02-04 PROCEDURE — 0502F SUBSEQUENT PRENATAL CARE: CPT | Performed by: OBSTETRICS & GYNECOLOGY

## 2021-02-04 PROCEDURE — 90471 IMMUNIZATION ADMIN: CPT | Performed by: OBSTETRICS & GYNECOLOGY

## 2021-02-04 PROCEDURE — 90715 TDAP VACCINE 7 YRS/> IM: CPT | Performed by: OBSTETRICS & GYNECOLOGY

## 2021-02-04 NOTE — PATIENT INSTRUCTIONS

## 2021-02-04 NOTE — PROGRESS NOTES
After obtaining consent, and per orders of Dr. Chucho Samson, injection of Tdap Vaccine given by Susan Ribeiro MA. Patient instructed to remain in clinic for 20 minutes afterwards, and to report any adverse reaction to me immediately.     TDAP  : Fabrus  Site: left Deltoid  Route: Intramuscular  Dose: 0.5ML  Lot#: [id number]  Exp date: 01/13/2023  NDC:86352-802-29    Patient told about normal glucola results from last visit and given information for her to be able to start the Iron supplement as directed by Dr. Chucho Samson

## 2021-02-17 ENCOUNTER — TELEPHONE (OUTPATIENT)
Dept: OBGYN CLINIC | Age: 31
End: 2021-02-17

## 2021-02-17 NOTE — TELEPHONE ENCOUNTER
Called and left a message for the patient regarding her appointment for tomorrow. Offered for patient to reschedule due to weather if she would like.

## 2021-02-24 ENCOUNTER — ROUTINE PRENATAL (OUTPATIENT)
Dept: OBGYN CLINIC | Age: 31
End: 2021-02-24
Payer: MEDICAID

## 2021-02-24 VITALS
HEIGHT: 60 IN | WEIGHT: 163 LBS | SYSTOLIC BLOOD PRESSURE: 126 MMHG | BODY MASS INDEX: 32 KG/M2 | DIASTOLIC BLOOD PRESSURE: 80 MMHG

## 2021-02-24 DIAGNOSIS — Z34.02 ENCOUNTER FOR SUPERVISION OF NORMAL FIRST PREGNANCY IN SECOND TRIMESTER: ICD-10-CM

## 2021-02-24 PROCEDURE — 0502F SUBSEQUENT PRENATAL CARE: CPT | Performed by: OBSTETRICS & GYNECOLOGY

## 2021-02-24 NOTE — PATIENT INSTRUCTIONS

## 2021-03-10 ENCOUNTER — ROUTINE PRENATAL (OUTPATIENT)
Dept: OBGYN CLINIC | Age: 31
End: 2021-03-10
Payer: MEDICAID

## 2021-03-10 VITALS — SYSTOLIC BLOOD PRESSURE: 112 MMHG | DIASTOLIC BLOOD PRESSURE: 78 MMHG | WEIGHT: 166 LBS | BODY MASS INDEX: 32.42 KG/M2

## 2021-03-10 DIAGNOSIS — Z34.02 ENCOUNTER FOR SUPERVISION OF NORMAL FIRST PREGNANCY IN SECOND TRIMESTER: ICD-10-CM

## 2021-03-10 PROCEDURE — 0502F SUBSEQUENT PRENATAL CARE: CPT | Performed by: OBSTETRICS & GYNECOLOGY

## 2021-03-10 RX ORDER — BUTALBITAL, ACETAMINOPHEN AND CAFFEINE 50; 325; 40 MG/1; MG/1; MG/1
1 TABLET ORAL
Qty: 20 TAB | Refills: 1 | Status: SHIPPED | OUTPATIENT
Start: 2021-03-10 | End: 2021-04-24

## 2021-03-10 NOTE — PATIENT INSTRUCTIONS
Semanas 32 a 34 de weston embarazo: Instrucciones de cuidado  Weeks 32 to 34 of Your Pregnancy: Care Instructions  Instrucciones de cuidado     [address] [place] de weston Bergjonathan lundberg podría sentir más jazmin y [place]. Es importante que descanse cuando pueda. Weston bebé en crecimiento está ejerciendo más presión sobre weston vejiga. Por eso, ann vez necesite orinar con más frecuencia. Las hemorroides también son comunes. Estas son venas en la asif del recto que causan dolor y comezón. En la semana 36, a la mayoría de las [employer] un análisis para detectar el estreptococo del kae B (GBS, por alecia siglas en inglés). El GBS es apurva bacteria común que puede vivir en la vagina y el recto. Podría enfermar a weston bebé después del nacimiento. Si el [employer] positivo, le darán antibióticos zoe el [place] de Gian. Estos prevendrán que el bebé se contagie con la bacteria. Podría convenirle hablar con weston médico sobre poner en un banco la stanton del cordón umbilical de weston bebé. Esta es la stanton que queda en el cordón después del nacimiento. Si desea guardar esta stanton, debe hacer los trámites necesarios con anticipación. No puede decidirlo en el último minuto. Si todavía no le bhardwaj aplicado la vacuna Tdap (tétanos, difteria y tos [place]) zoe dariel Caitlin, hable con weston médico acerca de aplicársela. Faith Diego a proteger a weston recién nacido contra la infección por tos ferina. La atención de seguimiento es apurva parte clave de weston tratamiento y seguridad. Asegúrese de hacer y acudir a todas las citas, y llame a weston médico si está teniendo problemas. También es apurva buena idea saber los resultados de alecia exámenes y mantener apurva lista de los medicamentos que shannon. ¿Cómo puede cuidarse en el hogar? Alivio de las hemorroides  · Aumente en weston dieta la cantidad de líquidos, frutas, verduras y Rosie. [place] ayudará a CANDACE [employer] · Evite estar sentada zoe demasiado tiempo.  Recu[facility]stese [facility] lado jazmin varias veces al día. · Límpiese con papel higiénico suave y húmedo. O puede utilizar compresas de infusión de hamamelis [facility] (\"witch hazel\") o toallas de higiene personal.  · Si tiene malestar, pruebe a usar compresas de hielo. O puede hacer manny de asiento tibios. Hágalos zoe 20 minutos por vez, según lo necesite. · Use apurva crema con hidrocortisona para el dolor y la picazón. Susy Span son Anusol y Preparation H Hydrocortisone. · Pregúntele a adair médico si puede adin un ablandador de heces de venta aspen. Considere el amamantamiento  · Los especialistas recomiendan que las mujeres amamanten por 1 año o [place]. · [place] ayudar a proteger a adair hijo contra algunos problemas de honorio. En comparación con los bebés que [address] [facility], los bebés que arie leche materna tienen menos probabilidades de:  ? [address] infecciones de oído, resfriados, diarrea y neumonía. ? Ser obesos o tener diabetes más adelante en adair porter. · [employer] a jenny bebés tienen menos sangrado después del [place]. Jenny úteros también recobran adair tamaño normal más rápido. · Algunas mujeres que amamantan bajan de [place] rápido. Elaborar leche quema calorías. · El amamantamiento puede disminuir el riesgo de tener cáncer de seno (mama), cáncer de ovario y osteoporosis. Decida sobre la circuncisión para los niños  · Al adin esta decisión, podría ser de ayuda pensar en jenny tradiciones personales, religiosas y familiares. Es adair decisión si desea conservar el pene de adair hijo natural o circuncidar a adair hijo. · Si decide que le gustaría que circuncidaran a adair bebé, hable con adair médico. Discuta cualquier inquietud que tenga sobre el dolor. También puede hablar acerca de jenny preferencias para la anestesia. ¿Dónde puede encontrar más información en inglés?   Vaya a [email or web address].holder.com/  Andrew [id number] en la búsqueda para aprender más acerca de \"Semanas 32 a 29 de adair embarazo: Instrucciones de cuidado. \"  Revisado: 11 febrero, 2020               Versión del contenido: 12.6  © 2321-6163 [employer]. Las instrucciones de cuidado fueron adaptadas bajo licencia por [employer] (which disclaims liability or warranty for this information). Si usted tiene Jolley Mooers afección médica o sobre estas instrucciones, siempre pregunte a adair profesional de honorio. [employer] niega toda garantía o responsabilidad por adair uso de esta información.

## 2021-03-30 ENCOUNTER — TELEPHONE (OUTPATIENT)
Dept: OBGYN CLINIC | Age: 31
End: 2021-03-30

## 2021-03-30 NOTE — TELEPHONE ENCOUNTER
Patient advised through  # [id number] Isaura Blair of MD recommendations and ok to wait till tomorrow and if she has more sob and chest pain to go to the ER    Patient verbalized understanding.

## 2021-03-30 NOTE — TELEPHONE ENCOUNTER
Call received at 2:55PM      27year old patient last seen in the office on 3/10/2021    Patient is  36w 4d pregnant      Patient denies vaginal bleeding rom and no contractions today but did have some yesterday and reports positive fetal movement    Patient calling today to say that she was just sitting and she experienced heart palpitations and sob for a period of 5 minutes, she drank some water and is feeling better, not further episodes    Patient has appointment tomorrow     Patient wanting to make sure she is ok to wait to be seen till tomorrow    Please advise

## 2021-03-30 NOTE — TELEPHONE ENCOUNTER
This nurse attempted to reach the patient through  Hermilo Kinds #[id number]      Message was left for the patient to call the office back

## 2021-03-31 ENCOUNTER — ROUTINE PRENATAL (OUTPATIENT)
Dept: OBGYN CLINIC | Age: 31
End: 2021-03-31
Payer: MEDICAID

## 2021-03-31 VITALS — DIASTOLIC BLOOD PRESSURE: 60 MMHG | BODY MASS INDEX: 32.61 KG/M2 | WEIGHT: 167 LBS | SYSTOLIC BLOOD PRESSURE: 110 MMHG

## 2021-03-31 DIAGNOSIS — Z36.85 ANTENATAL SCREENING FOR STREPTOCOCCUS B: Primary | ICD-10-CM

## 2021-03-31 LAB — GRBS, EXTERNAL: NEGATIVE

## 2021-03-31 PROCEDURE — 0502F SUBSEQUENT PRENATAL CARE: CPT | Performed by: OBSTETRICS & GYNECOLOGY

## 2021-03-31 NOTE — PATIENT INSTRUCTIONS

## 2021-03-31 NOTE — PROGRESS NOTES
Pt feeling fatigue. Having back pain, heart racing fast, and some white discharge. Pt request to have cervix check as a precaution. Desires information regarding covid vaccine.  Gave phone number

## 2021-04-02 LAB
GP B STREP DNA SPEC QL NAA+PROBE: NEGATIVE
SPECIMEN STATUS REPORT, ROLRST: NORMAL

## 2021-04-14 ENCOUNTER — ROUTINE PRENATAL (OUTPATIENT)
Dept: OBGYN CLINIC | Age: 31
End: 2021-04-14
Payer: MEDICAID

## 2021-04-14 ENCOUNTER — TRANSCRIBE ORDER (OUTPATIENT)
Dept: REGISTRATION | Age: 31
End: 2021-04-14

## 2021-04-14 ENCOUNTER — HOSPITAL ENCOUNTER (OUTPATIENT)
Dept: PREADMISSION TESTING | Age: 31
Discharge: HOME | End: 2021-04-14
Payer: MEDICAID

## 2021-04-14 VITALS
WEIGHT: 167 LBS | SYSTOLIC BLOOD PRESSURE: 126 MMHG | HEIGHT: 60 IN | DIASTOLIC BLOOD PRESSURE: 82 MMHG | BODY MASS INDEX: 32.79 KG/M2

## 2021-04-14 DIAGNOSIS — Z01.812 PRE-PROCEDURE LAB EXAM: ICD-10-CM

## 2021-04-14 DIAGNOSIS — Z34.80 SUPERVISION OF OTHER NORMAL PREGNANCY: Primary | ICD-10-CM

## 2021-04-14 DIAGNOSIS — Z01.812 PRE-PROCEDURE LAB EXAM: Primary | ICD-10-CM

## 2021-04-14 PROCEDURE — U0005 INFEC AGEN DETEC AMPLI PROBE: HCPCS

## 2021-04-14 PROCEDURE — 0502F SUBSEQUENT PRENATAL CARE: CPT | Performed by: OBSTETRICS & GYNECOLOGY

## 2021-04-14 NOTE — PATIENT INSTRUCTIONS
Semana 39 de adair embarazo: Instrucciones de cuidado  Week 44 of Your Pregnancy: Care Instructions  Instrucciones de cuidado     Soumya estas últimas semanas, podría sentirse ansiosa por twin a adair nuevo bebé. Los bebés recién nacidos suelen tener un aspecto distinto al que ve en fotografías o películas. Inmediatamente después del nacimiento, es posible que la deidra tenga apurva forma extraña. Los ojos podrían estar inflamados. Y los genitales ann vez estén hinchados. Además, podrían tener la piel muy seca o marcas rojizas en los párpados, la nariz o el erum. De todos modos, la mayoría de los padres creen que alecia bebés son hermosos. La atención de seguimiento es apurva parte clave de adair tratamiento y seguridad. Asegúrese de hacer y acudir a todas las citas, y llame a adair médico si está teniendo problemas. También es apurva buena idea saber los resultados de alecia exámenes y mantener apurva lista de los medicamentos que shannon. ¿Cómo puede cuidarse en el hogar? Prepárese para amamantar  · Si amamanta, siga comiendo alimentos saludables. · Si adair proveedor de Zari se lo recomienda, siga tomando alecia vitaminas prenatales. · Hable con adair médico antes de adin cualquier medicamento o suplemento. Eso es porque algunos medicamentos pueden afectar la [address]. · Puede ayudar a evitar dolor en los pezones alimentando a adair bebé en la posición correcta. Las [employer] ayudarán a aprender [employer]. Elija el método anticonceptivo correcto después de que nazca el bebé  · Las mujeres que están amamantando aún pueden [place]. Use un método anticonceptivo si no quiere quedar embarazada. · Los dispositivos intrauterinos (DIU) son muy eficaces para prevenir el embarazo y pueden proporcionar protección contra el embarazo por entre 3 y 11 años, según el tipo. Si usted habla con adair médico antes de tener a adair bebé, pueden colocarle el DIU inmediatamente después de lorelei a nura.  Si usted decide que Alda Holstein embarazada más adelante, pueden extraérselo. Son seguros de usar Brianda Fuelling. · Un implante hormonal también es muy eficaz para prevenir el embarazo. Se coloca debajo de la piel del brazo. [place] puede hacerse inmediatamente después de lorelei a nura. Libera la hormona progestina y previene el embarazo por alrededor de 3 años. También puede extraerlo un médico en cualquier momento. Es seguro de usar mientras está amamantando. · Se puede usar Depo-Provera mientras está amamantando. Es apurva inyección que se aplica cada 3 meses. · Las pastillas anticonceptivas funcionan hemal. Sheryl usted necesita apurva clase diferente de pastilla las primeras semanas después de lorelei a nura. Y cuando comience a adin estas pastillas, tendrá que asegurarse de usar otro tipo de anticonceptivo por 7 días después de comenzar el paquete. · Los diafragmas, los capuchones cervicales y los condones con espermicidas son menos eficaces después de que da a nura. Si usted tiene un diafragma o un capuchón cervical, hable con adair médico para twin si necesita un tamaño diferente. · Tanto la ligadura de trompas (atadura de trompas) shelia la vasectomía son permanentes. Estas son Katherene Debbie opciones si está jaeger de que ya no va a querer [facility]. ¿Dónde puede encontrar más información en inglés? Vaya a [email or web address].gray.com/  Andrew [id number] en la búsqueda para aprender más acerca de \"Semana 44 de adair embarazo: Instrucciones de cuidado. \"  Revisado: 8 octubre, 2020               Versión del contenido: 12.8  © 9914-5567 [employer]wise[employer] Incorporated. Las instrucciones de cuidado fueron adaptadas bajo licencia por [employer] (which disclaims liability or warranty for this information). Si usted tiene [place] afección médica o sobre estas instrucciones, siempre pregunte a adair profesional de honorio. [employer], [facility] [facility] niega toda garantía o responsabilidad por adair uso de esta información.

## 2021-04-15 LAB — SARS-COV-2, COV2NT: NOT DETECTED

## 2021-04-21 ENCOUNTER — ROUTINE PRENATAL (OUTPATIENT)
Dept: OBGYN CLINIC | Age: 31
End: 2021-04-21
Payer: MEDICAID

## 2021-04-21 ENCOUNTER — HOSPITAL ENCOUNTER (INPATIENT)
Age: 31
LOS: 2 days | Discharge: HOME | DRG: 560 | End: 2021-04-24
Attending: OBSTETRICS & GYNECOLOGY | Admitting: ADVANCED PRACTICE MIDWIFE
Payer: MEDICAID

## 2021-04-21 VITALS
BODY MASS INDEX: 33.18 KG/M2 | HEIGHT: 60 IN | SYSTOLIC BLOOD PRESSURE: 106 MMHG | DIASTOLIC BLOOD PRESSURE: 60 MMHG | WEIGHT: 169 LBS

## 2021-04-21 DIAGNOSIS — O36.8130 DECREASED FETAL MOVEMENTS IN THIRD TRIMESTER, SINGLE OR UNSPECIFIED FETUS: Primary | ICD-10-CM

## 2021-04-21 LAB
ERYTHROCYTE [DISTWIDTH] IN BLOOD BY AUTOMATED COUNT: 19.1 % (ref 11.5–14.5)
HCT VFR BLD AUTO: 34.9 % (ref 35–47)
HGB BLD-MCNC: 10.8 G/DL (ref 11.5–16)
MCH RBC QN AUTO: 24.9 PG (ref 26–34)
MCHC RBC AUTO-ENTMCNC: 30.9 G/DL (ref 30–36.5)
MCV RBC AUTO: 80.4 FL (ref 80–99)
NRBC # BLD: 0 K/UL (ref 0–0.01)
NRBC BLD-RTO: 0 PER 100 WBC
PLATELET # BLD AUTO: 208 K/UL (ref 150–400)
PMV BLD AUTO: 10.3 FL (ref 8.9–12.9)
RBC # BLD AUTO: 4.34 M/UL (ref 3.8–5.2)
WBC # BLD AUTO: 7.8 K/UL (ref 3.6–11)

## 2021-04-21 PROCEDURE — 74011250637 HC RX REV CODE- 250/637: Performed by: OBSTETRICS & GYNECOLOGY

## 2021-04-21 PROCEDURE — 76060000078 HC EPIDURAL ANESTHESIA

## 2021-04-21 PROCEDURE — 85027 COMPLETE CBC AUTOMATED: CPT

## 2021-04-21 PROCEDURE — 36415 COLL VENOUS BLD VENIPUNCTURE: CPT

## 2021-04-21 PROCEDURE — 74011250637 HC RX REV CODE- 250/637: Performed by: MIDWIFE

## 2021-04-21 PROCEDURE — 59025 FETAL NON-STRESS TEST: CPT | Performed by: OBSTETRICS & GYNECOLOGY

## 2021-04-21 PROCEDURE — 65270000029 HC RM PRIVATE

## 2021-04-21 PROCEDURE — 0502F SUBSEQUENT PRENATAL CARE: CPT | Performed by: OBSTETRICS & GYNECOLOGY

## 2021-04-21 PROCEDURE — 75410000002 HC LABOR FEE PER 1 HR

## 2021-04-21 RX ORDER — ZOLPIDEM TARTRATE 5 MG/1
5 TABLET ORAL
Status: DISCONTINUED | OUTPATIENT
Start: 2021-04-21 | End: 2021-04-22

## 2021-04-21 RX ORDER — BUTORPHANOL TARTRATE 1 MG/ML
2 INJECTION INTRAMUSCULAR; INTRAVENOUS
Status: DISCONTINUED | OUTPATIENT
Start: 2021-04-21 | End: 2021-04-22

## 2021-04-21 RX ORDER — TERBUTALINE SULFATE 1 MG/ML
0.25 INJECTION SUBCUTANEOUS AS NEEDED
Status: DISCONTINUED | OUTPATIENT
Start: 2021-04-21 | End: 2021-04-22

## 2021-04-21 RX ORDER — FENTANYL CITRATE 50 UG/ML
100 INJECTION, SOLUTION INTRAMUSCULAR; INTRAVENOUS
Status: DISCONTINUED | OUTPATIENT
Start: 2021-04-21 | End: 2021-04-22

## 2021-04-21 RX ORDER — ONDANSETRON 2 MG/ML
4 INJECTION INTRAMUSCULAR; INTRAVENOUS
Status: DISCONTINUED | OUTPATIENT
Start: 2021-04-21 | End: 2021-04-22

## 2021-04-21 RX ADMIN — ZOLPIDEM TARTRATE 5 MG: 5 TABLET ORAL at 22:22

## 2021-04-21 RX ADMIN — MISOPROSTOL 25 MCG: 100 TABLET ORAL at 17:30

## 2021-04-21 NOTE — PROGRESS NOTES
Decreased FM x 3 days. BPP 8/8. Discussed IOL this afternoon    NST:   Indication: above  145 baseline, moderate variability, accels present, decels absent. No contractions. >20 minutes of monitoring. NST Reactive.     Jacklyn Newman MD

## 2021-04-21 NOTE — PROGRESS NOTES
4/21/2021  2:41 PM Patient arrived from home for induction. Patient reports decreased fetal movement for the past three days. Nicki Morley at bedside. 1935 Bedside report given to Gualberto Reyes RN.

## 2021-04-21 NOTE — H&P
History & Physical    Name: Sari Garduno MRN: [id number]  SSN: [id number]    YOB: 1990  Age: 27 y.o. Sex: female        Subjective:     Estimated Date of Delivery: 21  OB History        5    Para   3    Term   3            AB   1    Living   3       SAB        TAB        Ectopic        Molar        Multiple        Live Births   3                Ms. Esther Reese, 27 y.o.  at 39w5d presents with decreased fetal movement x3 days for induction of labor. Prenatal course was normal. Please see below for details. Patient Active Problem List    Diagnosis    Supervision of other normal pregnancy    Encounter for supervision of normal first pregnancy in second trimester     [place]    EDC by lmp and early sono  1. ASCUS pap cannot rule out high grade-s/p unremarkable colp    IOB labs: A+  Genetic Screening: Declined  Anatomy: WNL- Female. GTT: WNL  Flu: 12/3/20  TDAP: 2021  Third Tri Labs: On Iron  GBS: Negative  COVID: Negative    Pain mgmt. in labor: Unsure  Feeding: Bottle  Social:         No specialty comments available. Past Medical History:   Diagnosis Date    Gastritis      No past surgical history on file. Social History     Occupational History    Not on file   Tobacco Use    Smoking status: Never Smoker    Smokeless tobacco: Never Used   Substance and Sexual Activity    Alcohol use: Not Currently     Comment: Occ    Drug use: Not Currently    Sexual activity: Yes     Partners: Male     No family history on file. No Known Allergies  Prior to Admission medications    Medication Sig Start Date End Date Taking? Authorizing Provider   ferrous sulfate (IRON PO) Take  by mouth. Provider, Historical   butalbital-acetaminophen-caffeine (FIORICET, ESGIC) -40 mg per tablet Take 1 Tab by mouth every six (6) hours as needed for Headache. 3/10/21   Rosemarie Jones MD   PNV Combo #19-Iron-Fol Ac-DHA 22-6-1-200 mg cap Take 1 Tab by mouth daily. 1/7/21   Fuentes Rodríguez MD        Review of Systems: A comprehensive review of systems was negative except for that written in the HPI. Objective:     Vitals:  Vitals:    04/21/21 1443   BP: 105/68   Pulse: (!) 104   Resp: 16   Temp: 97.9 °F (36.6 °C)        Physical Exam:  Patient without distress. Cervical Exam: 3 cm dilated    50% effaced    -3 station    Presenting Part: cephalic  Membranes:  Intact    NST:   Indication: induction  135 baseline, moderate variability, accels present, decels absent. irreg contractions. >20 minutes of monitoring. NST Reactive. Prenatal Labs:   Lab Results   Component Value Date/Time    Rubella, External Immune 10/30/2020    RPR, External Non reactive 10/30/2020        Assessment/Plan:       27 y.o. 39w5d with decreased FM. Admit for induction of labor. FHT cat 1 now. Group B Strep was negative.     Signed By:  Debi Dong MD     April 21, 2021

## 2021-04-21 NOTE — PROGRESS NOTES
Patient concerned about decreased movement over the last three days. Patient would like to discuss labor options.

## 2021-04-22 ENCOUNTER — ANESTHESIA EVENT (OUTPATIENT)
Dept: LABOR AND DELIVERY | Age: 31
DRG: 560 | End: 2021-04-22
Payer: MEDICAID

## 2021-04-22 ENCOUNTER — ANESTHESIA (OUTPATIENT)
Dept: LABOR AND DELIVERY | Age: 31
DRG: 560 | End: 2021-04-22
Payer: MEDICAID

## 2021-04-22 PROBLEM — Z34.90 TERM PREGNANCY: Status: ACTIVE | Noted: 2021-04-22

## 2021-04-22 PROCEDURE — 59410 OBSTETRICAL CARE: CPT | Performed by: OBSTETRICS & GYNECOLOGY

## 2021-04-22 PROCEDURE — 74011250636 HC RX REV CODE- 250/636: Performed by: ANESTHESIOLOGY

## 2021-04-22 PROCEDURE — 74011000250 HC RX REV CODE- 250: Performed by: ANESTHESIOLOGY

## 2021-04-22 PROCEDURE — 74011250636 HC RX REV CODE- 250/636: Performed by: MIDWIFE

## 2021-04-22 PROCEDURE — 75410000003 HC RECOV DEL/VAG/CSECN EA 0.5 HR

## 2021-04-22 PROCEDURE — 3E033VJ INTRODUCTION OF OTHER HORMONE INTO PERIPHERAL VEIN, PERCUTANEOUS APPROACH: ICD-10-PCS | Performed by: OBSTETRICS & GYNECOLOGY

## 2021-04-22 PROCEDURE — A4300 CATH IMPL VASC ACCESS PORTAL: HCPCS

## 2021-04-22 PROCEDURE — 65410000002 HC RM PRIVATE OB

## 2021-04-22 PROCEDURE — 74011250637 HC RX REV CODE- 250/637: Performed by: OBSTETRICS & GYNECOLOGY

## 2021-04-22 PROCEDURE — 77030019905 HC CATH URETH INTMIT MDII -A

## 2021-04-22 PROCEDURE — 00HU33Z INSERTION OF INFUSION DEVICE INTO SPINAL CANAL, PERCUTANEOUS APPROACH: ICD-10-PCS | Performed by: ADVANCED PRACTICE MIDWIFE

## 2021-04-22 PROCEDURE — 75410000002 HC LABOR FEE PER 1 HR

## 2021-04-22 PROCEDURE — 0HQ9XZZ REPAIR PERINEUM SKIN, EXTERNAL APPROACH: ICD-10-PCS | Performed by: OBSTETRICS & GYNECOLOGY

## 2021-04-22 PROCEDURE — 75410000000 HC DELIVERY VAGINAL/SINGLE

## 2021-04-22 RX ORDER — EPHEDRINE SULFATE/0.9% NACL/PF 50 MG/5 ML
10 SYRINGE (ML) INTRAVENOUS
Status: DISCONTINUED | OUTPATIENT
Start: 2021-04-22 | End: 2021-04-22

## 2021-04-22 RX ORDER — ONDANSETRON 4 MG/1
4 TABLET, ORALLY DISINTEGRATING ORAL
Status: DISCONTINUED | OUTPATIENT
Start: 2021-04-22 | End: 2021-04-24 | Stop reason: HOSPADM

## 2021-04-22 RX ORDER — FENTANYL/BUPIVACAINE/NS/PF 2-1250MCG
1-16 PREFILLED PUMP RESERVOIR EPIDURAL CONTINUOUS
Status: DISCONTINUED | OUTPATIENT
Start: 2021-04-22 | End: 2021-04-22

## 2021-04-22 RX ORDER — OXYTOCIN/RINGER'S LACTATE 30/500 ML
87.3 PLASTIC BAG, INJECTION (ML) INTRAVENOUS AS NEEDED
Status: DISCONTINUED | OUTPATIENT
Start: 2021-04-22 | End: 2021-04-22

## 2021-04-22 RX ORDER — OXYTOCIN/RINGER'S LACTATE 30/500 ML
87.3 PLASTIC BAG, INJECTION (ML) INTRAVENOUS AS NEEDED
Status: DISCONTINUED | OUTPATIENT
Start: 2021-04-22 | End: 2021-04-24 | Stop reason: HOSPADM

## 2021-04-22 RX ORDER — LIDOCAINE HYDROCHLORIDE AND EPINEPHRINE 15; 5 MG/ML; UG/ML
INJECTION, SOLUTION EPIDURAL
Status: COMPLETED | OUTPATIENT
Start: 2021-04-22 | End: 2021-04-22

## 2021-04-22 RX ORDER — SODIUM CHLORIDE, SODIUM LACTATE, POTASSIUM CHLORIDE, CALCIUM CHLORIDE 600; 310; 30; 20 MG/100ML; MG/100ML; MG/100ML; MG/100ML
125 INJECTION, SOLUTION INTRAVENOUS CONTINUOUS
Status: DISCONTINUED | OUTPATIENT
Start: 2021-04-22 | End: 2021-04-22

## 2021-04-22 RX ORDER — SODIUM CHLORIDE 0.9 % (FLUSH) 0.9 %
5-40 SYRINGE (ML) INJECTION EVERY 8 HOURS
Status: DISCONTINUED | OUTPATIENT
Start: 2021-04-22 | End: 2021-04-24 | Stop reason: HOSPADM

## 2021-04-22 RX ORDER — DIPHENHYDRAMINE HCL 25 MG
25 CAPSULE ORAL
Status: DISCONTINUED | OUTPATIENT
Start: 2021-04-22 | End: 2021-04-24 | Stop reason: HOSPADM

## 2021-04-22 RX ORDER — ACETAMINOPHEN 325 MG/1
650 TABLET ORAL
Status: DISCONTINUED | OUTPATIENT
Start: 2021-04-22 | End: 2021-04-24 | Stop reason: HOSPADM

## 2021-04-22 RX ORDER — OXYTOCIN/RINGER'S LACTATE 30/500 ML
10 PLASTIC BAG, INJECTION (ML) INTRAVENOUS AS NEEDED
Status: DISCONTINUED | OUTPATIENT
Start: 2021-04-22 | End: 2021-04-24 | Stop reason: HOSPADM

## 2021-04-22 RX ORDER — OXYCODONE AND ACETAMINOPHEN 5; 325 MG/1; MG/1
1 TABLET ORAL
Status: DISCONTINUED | OUTPATIENT
Start: 2021-04-22 | End: 2021-04-24 | Stop reason: HOSPADM

## 2021-04-22 RX ORDER — OXYTOCIN/RINGER'S LACTATE 30/500 ML
1-25 PLASTIC BAG, INJECTION (ML) INTRAVENOUS
Status: DISCONTINUED | OUTPATIENT
Start: 2021-04-22 | End: 2021-04-22

## 2021-04-22 RX ORDER — HYDROCORTISONE 1 %
CREAM (GRAM) TOPICAL AS NEEDED
Status: DISCONTINUED | OUTPATIENT
Start: 2021-04-22 | End: 2021-04-24 | Stop reason: HOSPADM

## 2021-04-22 RX ORDER — SODIUM CHLORIDE 0.9 % (FLUSH) 0.9 %
5-40 SYRINGE (ML) INJECTION AS NEEDED
Status: DISCONTINUED | OUTPATIENT
Start: 2021-04-22 | End: 2021-04-24 | Stop reason: HOSPADM

## 2021-04-22 RX ORDER — NALOXONE HYDROCHLORIDE 0.4 MG/ML
0.4 INJECTION, SOLUTION INTRAMUSCULAR; INTRAVENOUS; SUBCUTANEOUS AS NEEDED
Status: DISCONTINUED | OUTPATIENT
Start: 2021-04-22 | End: 2021-04-22

## 2021-04-22 RX ORDER — IBUPROFEN 400 MG/1
800 TABLET ORAL EVERY 8 HOURS
Status: DISCONTINUED | OUTPATIENT
Start: 2021-04-22 | End: 2021-04-24 | Stop reason: HOSPADM

## 2021-04-22 RX ORDER — DOCUSATE SODIUM 100 MG/1
100 CAPSULE, LIQUID FILLED ORAL
Status: DISCONTINUED | OUTPATIENT
Start: 2021-04-22 | End: 2021-04-24 | Stop reason: HOSPADM

## 2021-04-22 RX ORDER — BUPIVACAINE HYDROCHLORIDE 5 MG/ML
30 INJECTION, SOLUTION EPIDURAL; INTRACAUDAL AS NEEDED
Status: DISCONTINUED | OUTPATIENT
Start: 2021-04-22 | End: 2021-04-22

## 2021-04-22 RX ORDER — OXYTOCIN/RINGER'S LACTATE 30/500 ML
10 PLASTIC BAG, INJECTION (ML) INTRAVENOUS AS NEEDED
Status: DISCONTINUED | OUTPATIENT
Start: 2021-04-22 | End: 2021-04-22

## 2021-04-22 RX ORDER — SIMETHICONE 80 MG
80 TABLET,CHEWABLE ORAL
Status: DISCONTINUED | OUTPATIENT
Start: 2021-04-22 | End: 2021-04-24 | Stop reason: HOSPADM

## 2021-04-22 RX ORDER — BUPIVACAINE HYDROCHLORIDE 2.5 MG/ML
INJECTION, SOLUTION EPIDURAL; INFILTRATION; INTRACAUDAL
Status: COMPLETED
Start: 2021-04-22 | End: 2021-04-22

## 2021-04-22 RX ORDER — AMMONIA 15 % (W/V)
1 AMPUL (EA) INHALATION AS NEEDED
Status: DISCONTINUED | OUTPATIENT
Start: 2021-04-22 | End: 2021-04-24 | Stop reason: HOSPADM

## 2021-04-22 RX ORDER — FENTANYL CITRATE 50 UG/ML
INJECTION, SOLUTION INTRAMUSCULAR; INTRAVENOUS
Status: COMPLETED
Start: 2021-04-22 | End: 2021-04-22

## 2021-04-22 RX ORDER — FENTANYL CITRATE 50 UG/ML
INJECTION, SOLUTION INTRAMUSCULAR; INTRAVENOUS AS NEEDED
Status: DISCONTINUED | OUTPATIENT
Start: 2021-04-22 | End: 2021-04-22 | Stop reason: HOSPADM

## 2021-04-22 RX ORDER — HYDROCORTISONE ACETATE PRAMOXINE HCL 2.5; 1 G/100G; G/100G
CREAM TOPICAL AS NEEDED
Status: DISCONTINUED | OUTPATIENT
Start: 2021-04-22 | End: 2021-04-24 | Stop reason: HOSPADM

## 2021-04-22 RX ORDER — BUPIVACAINE HYDROCHLORIDE 2.5 MG/ML
INJECTION, SOLUTION EPIDURAL; INFILTRATION; INTRACAUDAL
Status: COMPLETED | OUTPATIENT
Start: 2021-04-22 | End: 2021-04-22

## 2021-04-22 RX ORDER — FENTANYL CITRATE 50 UG/ML
100 INJECTION, SOLUTION INTRAMUSCULAR; INTRAVENOUS ONCE
Status: DISCONTINUED | OUTPATIENT
Start: 2021-04-22 | End: 2021-04-22

## 2021-04-22 RX ADMIN — OXYTOCIN 1 MILLI-UNITS/MIN: 10 INJECTION INTRAVENOUS at 06:09

## 2021-04-22 RX ADMIN — LIDOCAINE HYDROCHLORIDE,EPINEPHRINE BITARTRATE 3 ML: 15; .005 INJECTION, SOLUTION EPIDURAL; INFILTRATION; INTRACAUDAL; PERINEURAL at 13:16

## 2021-04-22 RX ADMIN — SODIUM CHLORIDE, POTASSIUM CHLORIDE, SODIUM LACTATE AND CALCIUM CHLORIDE 125 ML/HR: 600; 310; 30; 20 INJECTION, SOLUTION INTRAVENOUS at 06:09

## 2021-04-22 RX ADMIN — SODIUM CHLORIDE, POTASSIUM CHLORIDE, SODIUM LACTATE AND CALCIUM CHLORIDE 999 ML/HR: 600; 310; 30; 20 INJECTION, SOLUTION INTRAVENOUS at 12:24

## 2021-04-22 RX ADMIN — SODIUM CHLORIDE, POTASSIUM CHLORIDE, SODIUM LACTATE AND CALCIUM CHLORIDE 125 ML/HR: 600; 310; 30; 20 INJECTION, SOLUTION INTRAVENOUS at 17:34

## 2021-04-22 RX ADMIN — IBUPROFEN 800 MG: 400 TABLET, FILM COATED ORAL at 21:29

## 2021-04-22 RX ADMIN — Medication 10 ML/HR: at 13:22

## 2021-04-22 RX ADMIN — BUPIVACAINE HYDROCHLORIDE 5 ML: 2.5 INJECTION, SOLUTION EPIDURAL; INFILTRATION; INTRACAUDAL; PERINEURAL at 13:16

## 2021-04-22 RX ADMIN — FENTANYL CITRATE 100 MCG: 50 INJECTION, SOLUTION INTRAMUSCULAR; INTRAVENOUS at 13:16

## 2021-04-22 NOTE — PROGRESS NOTES
1940 - Bedside and Verbal shift change report given to LILLY Domínguez RNC (oncoming nurse) by OSWALDO Zarco RN (offgoing nurse). Report included the following information SBAR, Kardex, Intake/Output, MAR, Accordion, Recent Results and Med Rec Status. Pt is watching TV,  Hemanth Ramos) present. No requests or complaints at this time (she states she feels contractions as mild). 0740 - Bedside and Verbal shift change report given to M. Suzzane Seip RN (oncoming nurse) by Juan Gonsalez RNC (offgoing nurse). Report included the following information SBAR, Kardex, Procedure Summary, Intake/Output, MAR, Accordion, Recent Results and Med Rec Status.

## 2021-04-22 NOTE — PROGRESS NOTES
Labor Progress Note  Patient seen, fetal heart rate and contraction pattern evaluated, patient examined. No data found. Physical Exam:  Cervical Exam:  4 cm dilated    50% effaced    -3 station    Presenting Part: cephalic  Membranes:  Intact  Fetal Heart Rate: Baseline: 135 per minute  Variability: moderate  Accelerations: yes  Decelerations: none  Uterine contractions: irregular, every 3-6 minutes    Assessment/Plan:   Reassuring fetal status, Labor  Progressing normally, Continue plan for vaginal delivery.  [address] Tony Thomas MD  4/22/2021 9:35 AM

## 2021-04-22 NOTE — PROGRESS NOTES
Asked to assess pt's bleeding by RN per Dr. Beka Morgan. Patient is a  @ 39 6/7 wks undergoing IOL for decreased FM. She is on 13 of pit. Cat I fetal tracing. Maritza q 2-3 minutes. VSS  Pad seen with mixture of blood, mucous and clots. More blood and show than is typical.  Perineum examined and small amount of blood with obvious clear fluid coming from vagina c/w SROM. Cx just checked by RN and was 5cm. Increased bloody show but no intervention warranted at this time. Continue close observation.

## 2021-04-22 NOTE — PROGRESS NOTES
Bedside and Verbal shift change report given to JENNY Sanchez RN (oncoming nurse) by Nata Morley RN (offgoing nurse). Report included the following information SBAR, Kardex, Intake/Output, MAR and Recent Results. 1200:  Discussed with pt procedure for epidural with assistance from [employer] translation at bedside. Pt encouraged to have light lunch with request by pt for epidural placement soon. 1251:  Dr. Jose Franco called to place epidural.  1302:  Pt sitting on side of bed, Dr. Jose Franco at bedside to place epidural.  1308:  Epidural cath placed and dosed per Dr. Jose Franco. Pt turned on L side  1426:  Pt called out that she felt like her water broke, noted 3-4 inch mixture of small clots, with bloody fluid and mucous. 1427:  Dr. Jamar Segundo notified of SROM with bloody fluid and clots, requested to do cervical exam.  [phone]:  Cervical exam per Paul Herrera RN:  5/80/-2-3.  1435:  Dr. Jamar Segundo notified of cervical exam and requested Dr. Jose Christian assess pt's bleeding. 1438:  Dr. Jose Christian at bedside assessing pt and observed chux with clots, bloody fluid and mucous. Assessed FHR strip and pt's VS and states to continue present course. 1517:  Notified Dr. Jamar Segundo of late decels and Pitocin off at this time. States he will come to assess after clinic. To continue present course,.  1645:  Dr. Jamar Segundo phoned in and aware that Pitocin has been restarted, viewed FHR Strip.  1653:  Used Stratus  to explain side lying pelvic release to pt and pt states she understands and is in agreement to to this. 1747:  Spoke with Dr. Jose Christian who has taken over care for pt. She is aware of intermittent late decels and states she has viewed the FHR strip. She is also aware of current Pitocin infusion. 1812:  Dr. Jose Christian at bedside using Stratus  to discuss plan of care with pt.    1815:  Cervical exam per Dr. Jose Christian:  8/100/-1. Pitocin is to remain off at this time and states OK to try [place] position. 1835:   In [place] position with assistance from Paul Herrera RN and OSWALDO Zarco RN for 3 UC's.  1899: Turned on R side. 1945:  Bedside and Verbal shift change report given to TESSA Nguyen RN (oncoming nurse) by JENNY Sanchez RN (offgoing nurse). Report included the following information SBAR, Kardex, Intake/Output, MAR and Recent Results.

## 2021-04-22 NOTE — PROGRESS NOTES
730 Bedside shift change report given to ERI Plasencia, RN (oncoming nurse) by Gilles Denny, RN (offgoing nurse). Report included the following information SBAR, MAR, I/O.       7870 interpretor service used. Epidural explained and all questions answered. 0362 Dr. Zay Gtz at bedside. Using interpretor. SVE 4/50/-3.     1126 RN at bedside. FHT tone audible. Patient up to bathroom. 1130 Bedside shift change report given to Adam Hendrix RN (oncoming nurse) by Tracy Mishra RN (offgoing nurse).  Report included the following information SBAR, MAR, I/O.

## 2021-04-22 NOTE — ANESTHESIA PROCEDURE NOTES
Epidural Block    Patient location during procedure: OB  Reason for block: labor epidural  Staffing  Performed: attending   Anesthesiologist: Kyler Mack, DO  Preanesthetic Checklist  Completed: patient identified, IV checked, site marked, risks and benefits discussed, surgical consent, monitors and equipment checked, pre-op evaluation and timeout performed  Block Placement  Patient position: sitting  Prep: ChloraPrep  Sterility prep: cap, drape, gloves and mask  Sedation level: no sedation  Patient monitoring: continuous pulse oximetry and heart rate  Approach: midline  Location: lumbar  Lumbar location: L3-L4  Epidural  Loss of resistance technique: air  Guidance: landmark technique  Needle  Needle type: Tuohy   Needle gauge: 17 G  Needle length: 9 cm  Needle insertion depth: 5 cm  Catheter type: end hole  Catheter size: 19 G  Catheter at skin depth: 9 cm  Catheter securement method: clear occlusive dressing and surgical tape  Test dose: negative  Medications Administered  Bupivacaine (PF) (MARCAINE) 0.25% Epidural, 5 mL  lidocaine-EPINEPHrine (XYLOCAINE) 1.5 %-1:200,000 Epidural, 3 mL  Assessment  Sensory level: T10  Block outcome: pain improved  Number of attempts: 1  Procedure assessment: patient tolerated procedure well with no complications

## 2021-04-22 NOTE — PROGRESS NOTES
Assumed care of pt at [phone] from Dr. Serafin Morley. In now to assess pt as pit was back up to 6 but return of late decels with moderate variability. Pit is off. Patient continues to have some bleeding but much less than earlier. Now more like heavy show. Cx 8/100/-1. Reviewed findings and plan of care with patient using video . RN will try Time Luo. Plan to leave pit off for now as pt continues to contract & baby seems to tolerate it better with resolution of lates. Hopeful for ability to push soon.

## 2021-04-22 NOTE — PROGRESS NOTES
Labor Progress Note    CNM assumed pt care. Video  used. Reports mild cramping after cytotec this evening. Patient seen, fetal heart rate and contraction pattern evaluated. Physical Exam:  Cervical Exam: not examined  Membranes:  Intact  Uterine Activity: Irregular  Fetal Heart Rate: 135, moderate variability  Accelerations: yes  Decelerations: no    Assessment/Plan:  Reassuring fetal status. Will continue to observe overnight for labor. Repeat cytotec if contractions resolve. Plan for pitocin at 0600 and AROM when primary MD arrives.     Mendez Arzola CNM

## 2021-04-23 PROCEDURE — 74011250637 HC RX REV CODE- 250/637: Performed by: OBSTETRICS & GYNECOLOGY

## 2021-04-23 PROCEDURE — 65410000002 HC RM PRIVATE OB

## 2021-04-23 RX ORDER — IBUPROFEN 800 MG/1
800 TABLET ORAL
Qty: 40 TAB | Refills: 1 | Status: SHIPPED | OUTPATIENT
Start: 2021-04-23 | End: 2022-03-25

## 2021-04-23 RX ADMIN — IBUPROFEN 800 MG: 400 TABLET, FILM COATED ORAL at 14:08

## 2021-04-23 RX ADMIN — ACETAMINOPHEN 650 MG: 325 TABLET ORAL at 03:56

## 2021-04-23 RX ADMIN — DOCUSATE SODIUM 100 MG: 100 CAPSULE, LIQUID FILLED ORAL at 14:26

## 2021-04-23 RX ADMIN — IBUPROFEN 800 MG: 400 TABLET, FILM COATED ORAL at 05:22

## 2021-04-23 RX ADMIN — ACETAMINOPHEN 650 MG: 325 TABLET ORAL at 18:04

## 2021-04-23 NOTE — PROGRESS NOTES
Post-Partum Day 1 Progress Note    Patient doing well post-partum without significant complaint. Voiding without difficulty, normal lochia. Pain controlled. Vitals:    Patient Vitals for the past 8 hrs:   BP Temp Pulse Resp   21 0958 (!) 101/59 98 °F (36.7 °C) 80 16   21 0521 95/60 98.4 °F (36.9 °C) 80 16     Temp (24hrs), Av.5 °F (36.9 °C), Min:98 °F (36.7 °C), Max:98.8 °F (37.1 °C)      Vital signs stable, afebrile. Exam:  Patient without distress. Abdomen soft, fundus firm at level of umbilicus, nontender               Lower extremities are negative for swelling, cords or tenderness. Assessment and Plan:  Patient appears to be having uncomplicated post-partum course. Continue routine perineal care and maternal education. Plan discharge tomorrow if no problems occur.                    Signed By: Raegan Conn MD     2021

## 2021-04-23 NOTE — PROGRESS NOTES
Patient feeling more pressure. FHTs baseline 145, moderate variability, accels present, occ variable decels  TOCO q 4 minutes  Cx floppy 8/100/-1 forebag ruptured clear fluid    Hopeful for delivery soon.

## 2021-04-23 NOTE — DISCHARGE SUMMARY
Obstetrical Discharge Summary     Name: Edmund Shepard MRN: [id number]  SSN: [id number]    YOB: 1990  Age: 27 y.o. Sex: female      Admit Date: 2021    Discharge Date: 21    Admitting Physician: Vero Ortega MD     Attending Physician:  Marnie Luong MD     * Admission Diagnoses: Term pregnancy [Z34.90]    * Discharge Diagnoses:   Information for the patient's :  Marquis Rivera [[id number]]   Delivery of a 6 lb 15.6 oz (3.165 kg) female infant via Vaginal, Spontaneous on 2021 at 8:30 PM  by Nicholas Louis. Apgars were 9  and 9 . Additional Diagnoses:   Hospital Problems as of 2021 Never Reviewed          Codes Class Noted - Resolved POA    Term pregnancy ICD-10-CM: Z34.90  ICD-9-CM: V22.1  2021 - Present Unknown             Lab Results   Component Value Date/Time    ABO/Rh(D) A POSITIVE 2020 09:38 AM    Rubella, External Immune 10/30/2020    GrBStrep, External negative 2021    ABO,Rh A positive 10/30/2020      Immunization History   Administered Date(s) Administered    Influenza Vaccine [employer]) PF (>6 Mo Flulaval, Fluarix, and >3 Yrs Afluria, Fluzone 11250) 2020    Tdap 2021       * Discharge Condition: good and stable    * Hospital Course: Normal hospital course following the delivery. * Disposition: Home    Discharge Medications:   Current Discharge Medication List      START taking these medications    Details   ibuprofen (MOTRIN) 800 mg tablet Take 1 Tab by mouth every eight (8) hours as needed for Pain. Qty: 40 Tab, Refills: 1         CONTINUE these medications which have NOT CHANGED    Details   ferrous sulfate (IRON PO) Take  by mouth. PNV Combo #19-Iron-Fol Ac-DHA 22-6-1-200 mg cap Take 1 Tab by mouth daily. Qty: 90 Cap, Refills: 4    Comments: Please dispense the vitamin that is covered by her insurance.          STOP taking these medications       butalbital-acetaminophen-caffeine (Akiko Funes) -40 mg per tablet Comments:   Reason for Stopping:               * Follow-up Care/Patient Instructions:   Activity: Activity as tolerated, Ambulate in house, No sex for 6 weeks and No driving while on Narcotics  Diet: Regular Diet  Wound Care: Keep wound clean and dry if -section    Follow-up Information     Follow up With Specialties Details Why Contact Info    Rhett Little MD Obstetrics & Gynecology, Gynecology, [facility] [phone] [facility],[address]  [address] [phone]             Signed By:  Dipti Merrill MD     2021

## 2021-04-23 NOTE — L&D DELIVERY NOTE
Delivery Summary  Patient: Jessie Oreilly             Circumcision:   NA-female  Additional Delivery Comments - Patient progressed to full dilation and pushed with excellent effort to deliver a VFI very quickly. She delivered in the bed attended by me. No nuchal.  No difficulty with delivery of shoulders and body. Baby Yarelis Thorne) placed on mom's abdomen, pink and vigorous. Cord clamping delayed then cut by FOB. Placenta delivered intact with 3VC. Fundus firmed with pitocin and uterine massage. 1st degree introital laceration hemostatic wo repair. QBL (blood and amniotic fluid due to bed delivery) 1570. Weight 3165 g. Apgars 9 & 9. Information for the patient's :  Concepcion Moss [[id number]]       Labor Events:    Labor: No    Steroids: None   Cervical Ripening Date/Time: 2021 5:30 PM   Cervical Ripening Type: Misoprostol   Antibiotics During Labor: No   Rupture Identifier:      Rupture Date/Time: 2021 2:26 PM   Rupture Type: SROM   Amniotic Fluid Volume: Large    Amniotic Fluid Description: Blood stained    Amniotic Fluid Odor: None    Induction: Oxytocin       Induction Date/Time:        Indications for Induction: Other(comment)    Augmentation:     Augmentation Date/Time:      Indications for Augmentation:     Labor complications:          Additional complications:        Delivery Events:  Indications For Episiotomy:     Episiotomy: None   Perineal Laceration(s): 1st   Repaired:     Periurethral Laceration Location:      Repaired:     Labial Laceration Location:     Repaired:     Sulcal Laceration Location:     Repaired:     Vaginal Laceration Location:     Repaired:     Cervical Laceration Location:     Repaired:     Repair Suture: None   Number of Repair Packets:     Estimated Blood Loss (ml):  ml   Quantitative Blood Loss (ml)                Delivery Date: 2021    Delivery Time: 8:30 PM  Delivery Type: Vaginal, Spontaneous  Sex:  Female Gestational Age: 37w11d   Delivery Clinician:  Niki Villasenor  Living Status: Living   Delivery Location: L&D            APGARS  One minute Five minutes Ten minutes   Skin color: 1   1        Heart rate: 2   2        Grimace: 2   2        Muscle tone: 2   2        Breathin   2        Totals: 9   9            Presentation: Vertex    Position: Left Occiput Anterior  Resuscitation Method:  Tactile Stimulation     Meconium Stained: None      Cord Information: 3 Vessels  Complications: None  Cord around:    Delayed cord clamping? Yes  Cord clamped date/time:   Disposition of Cord Blood: Discard    Blood Gases Sent?: No    Placenta:  Date/Time: 2021  8:36 PM  Removal: Spontaneous      Appearance: Normal      Measurements:  Birth Weight:        Birth Length:        Head Circumference:        Chest Circumference:       Abdominal Girth:       Other Providers:   JANAE Silverio Maridee Samples, Obstetrician;Primary Nurse;Primary [place] Nurse;Nicu Nurse;Neonatologist;Anesthesiologist;Crna;Nurse Practitioner;Midwife;Nursery Nurse           Cord pH:  none    Episiotomy: None   Laceration(s): 1st     Estimated Blood Loss (ml):     Labor Events  Method: Oxytocin      Augmentation:    Cervical Ripenin2021  5:30 PM  Misoprostol        Hospital Problems  Never Reviewed          Codes Class Noted POA    Term pregnancy ICD-10-CM: Z34.90  ICD-9-CM: V22.1  2021 Unknown              Operative Vaginal Delivery - none    Group B Strep:   Lab Results   Component Value Date/Time    GrBStrep, External negative 2021     Information for the patient's :  Isacc Tellez [[id number]]   No results found for: ABORH, PCTABR, PCTDIG, BILI, ABORHEXT, ABORH     No results found for: APH, APCO2, APO2, AHCO3, ABEC, ABDC, O2ST, EPHV, PCO2V, PO2V, HCO3V, EBEV, EBDV, SITE, RSCOM

## 2021-04-23 NOTE — ROUTINE PROCESS
0740: Bedside shift change report given to JAI Berkowitz RN (oncoming nurse) by Meaghan Mays (offgoing nurse). Report included the following information SBAR.

## 2021-04-23 NOTE — ROUTINE PROCESS
Bedside and Verbal shift change report given to Rosio Jiménez (oncoming nurse) by Meenu Davison (offgoing nurse). Report included the following information SBAR.

## 2021-04-23 NOTE — PROGRESS NOTES
Progressing  [id number] - patient's epidural was at 8ml. When I discontinued the epidural after delivery there were no orders. 2046 - Epidural Discontinued 27.9mls left in epidural bag.

## 2021-04-23 NOTE — PROGRESS NOTES
TRANSFER - IN REPORT:    Verbal report received from TESSA Olivarez RN (name) on Nancy Bailey  being received from L&D(unit) for routine progression of care      Report consisted of patients Situation, Background, Assessment and   Recommendations(SBAR). Information from the following report(s) SBAR, Kardex and MAR was reviewed with the receiving nurse. Opportunity for questions and clarification was provided. Assessment completed upon patients arrival to unit and care assumed.      Ilene Mcdonough RN

## 2021-04-23 NOTE — ROUTINE PROCESS
Bedside and Verbal shift change report given to LEANNE Toledo, RN (oncoming nurse) by Gera Blackman. Noma Hamman, RN (offgoing nurse). Report included the following information SBAR.

## 2021-04-24 VITALS
DIASTOLIC BLOOD PRESSURE: 66 MMHG | SYSTOLIC BLOOD PRESSURE: 99 MMHG | BODY MASS INDEX: 33.18 KG/M2 | OXYGEN SATURATION: 97 % | HEART RATE: 86 BPM | HEIGHT: 60 IN | TEMPERATURE: 98.7 F | WEIGHT: 169 LBS | RESPIRATION RATE: 14 BRPM

## 2021-04-24 PROBLEM — Z3A.39 39 WEEKS GESTATION OF PREGNANCY: Status: ACTIVE | Noted: 2021-04-24

## 2021-04-24 PROCEDURE — 74011250637 HC RX REV CODE- 250/637: Performed by: OBSTETRICS & GYNECOLOGY

## 2021-04-24 RX ORDER — OXYCODONE AND ACETAMINOPHEN 5; 325 MG/1; MG/1
1 TABLET ORAL
Qty: 10 TAB | Refills: 0 | Status: SHIPPED | OUTPATIENT
Start: 2021-04-24 | End: 2021-04-27

## 2021-04-24 RX ADMIN — IBUPROFEN 800 MG: 400 TABLET, FILM COATED ORAL at 11:16

## 2021-04-24 RX ADMIN — OXYCODONE HYDROCHLORIDE AND ACETAMINOPHEN 1 TABLET: 5; 325 TABLET ORAL at 04:38

## 2021-04-24 RX ADMIN — IBUPROFEN 800 MG: 400 TABLET, FILM COATED ORAL at 03:17

## 2021-04-24 RX ADMIN — OXYCODONE HYDROCHLORIDE AND ACETAMINOPHEN 1 TABLET: 5; 325 TABLET ORAL at 09:56

## 2021-04-24 NOTE — DISCHARGE INSTRUCTIONS
POSTPARTUM DISCHARGE INSTRUCTIONS       Name:  Asher Jesus  YOB: 1990  Admission Diagnosis:  Term pregnancy [Z34.90]     Discharge Diagnosis:    Problem List as of 4/23/2021 Never Reviewed          Codes Class Noted - Resolved    Term pregnancy ICD-10-CM: Z34.90  ICD-9-CM: V22.1  4/22/2021 - Present        Supervision of other normal pregnancy ICD-10-CM: Z34.80  ICD-9-CM: V22.1  12/3/2020 - Present        Encounter for supervision of normal first pregnancy in second trimester ICD-10-CM: Z34.02  ICD-9-CM: V22.0  12/3/2020 - Present    Overview Addendum 4/21/2021  7:55 AM by Juan Osei   EDC by lmp and early sono  1. ASCUS pap cannot rule out high grade-s/p unremarkable colp    IOB labs: A+  Genetic Screening: Declined  Anatomy: WNL- Female. GTT: WNL  Flu: 12/3/20  TDAP: 02/04/2021  Third Tri Labs: On Iron  GBS: Negative  COVID: Negative    Pain mgmt. in labor: Unsure  Feeding: Bottle  Social:                   Attending Physician:  Hari Blackburn MD      If you had Vaginal Childbirth:    Your Recovery: Your body will slowly heal in the next few weeks. It is easy to get too tired and overwhelmed during the first weeks after your baby is born. Changes in your hormones can shift your mood without warning. You may find it hard to meet the extra demands on your energy and time. Take it easy on yourself. Follow-up care is a key part of your treatment and safety. Be sure to make and go to all appointments, and call your doctor if you are having problems. It's also a good idea to know your test results and keep a list of the medicines you take. How can you care for yourself at home? Vaginal bleeding and cramps  · After delivery, you will have a bloody discharge from the vagina. This will turn pink within a week and then white or yellow after about 10 days. It may last for 2 to 4 weeks or longer, until the uterus has healed.  Use pads instead of tampons until you stop bleeding. · Do not worry if you pass some blood clots, as long as they are smaller than a golf ball. If you have a tear or stitches in your vaginal area, change the pad at least every 4 hours to prevent soreness and infection. · You may have cramps for the first few days after childbirth. These are normal and occur as the uterus shrinks to normal size. Take an over-the-counter pain medicine, such as acetaminophen (Tylenol), ibuprofen (Advil, Motrin), or naproxen (Aleve), for cramps. Read and follow all instructions on the label. Do not take aspirin, because it can cause more bleeding. Do not take acetaminophen (Tylenol) and other acetaminophen containing medications (i.e. Percocet) at the same time. Episiotomy, Lacerations or Tears  · If you have stitches, they will dissolve on their own and do not need to be removed. · Put ice or a cold pack on your painful area for 10 to 20 minutes at a time, several times a day, for the first few days. Put a thin cloth between the ice and your skin. · Sit in a few inches of warm water (sitz bath) 3 times a day and after bowel movements. The warm water helps with pain and itching. If you do not have a tub, a warm shower might help. Breast fullness  · Your breasts may overfill (engorge) in the first few days after delivery. To help milk flow and to relieve pain, warm your breasts in the shower or by using warm, moist towels before nursing. · If you are not nursing, do not put warmth on your breasts or touch your breasts. Wear a tight bra or sports bra and use ice until the fullness goes away. This usually takes 2 to 3 days. · Put ice or a cold pack on your breast after nursing to reduce swelling and pain. Put a thin cloth between the ice and your skin. Activity  · Eat a balanced diet. Do not try to lose weight by cutting calories. Keep taking your prenatal vitamins, or take a multivitamin. · Get as much rest as you can.  Try to take naps when your baby sleeps during the day. · Get some exercise every day. But do not do any heavy exercise until your doctor says it is okay. · Wait until you are healed (about 4 to 6 weeks) before you have sexual intercourse. Your doctor will tell you when it is okay to have sex. · Talk to your doctor about birth control. You can get pregnant even before your period returns. Also, you can get pregnant while you are breast-feeding. Mental Health  · Many women get the \"baby blues\" during the first few days after childbirth. You may lose sleep, feel irritable, and cry easily. You may feel happy one minute and sad the next. Hormone changes are one cause of these emotional changes. Also, the demands of a new baby, along with visits from relatives or other family needs, add to a mother's stress. The \"baby blues\" often peak around the fourth day. Then they ease up in less than 2 weeks. · If your moodiness or anxiety lasts for more than 2 weeks, or if you feel like life is not worth living, you may have postpartum depression. This is different for each mother. Some mothers with serious depression may worry intensely about their infant's well-being. Others may feel distant from their child. Some mothers might even feel that they might harm their baby. A mother may have signs of paranoia, wondering if someone is watching her. · With all the changes in your life, you may not know if you are depressed. Pregnancy sometimes causes changes in how you feel that are similar to the symptoms of depression. · Symptoms of depression include:  · Feeling sad or hopeless and losing interest in daily activities. These are the most common symptoms of depression. · Sleeping too much or not enough. · Feeling tired. You may feel as if you have no energy. · Eating too much or too little. · [employer] (PSI) offers a Warm line; Chat with the Expert phone sessions;  Information and Articles about Pregnancy and Postpartum Mood Disorders; Comprehensive List of Free Support Groups; Knowledgeable local coordinators who will offer support, information, and resources; Guide to Resources on [employer]; Calendar of events in the  mood disorders community; Latest News and Research; and [facility] [address] for [employer]. Remember - You are not alone; You are not to blame; With help, you will be well. 1-035-585-PPD(5519). WWW. POSTPARTUM. NET   · Writing or talking about death, such as writing suicide notes or talking about guns, knives, or pills. Keep the numbers for these national suicide hotlines: 4-259-503-TALK (0-244.548.6088) and 2-205-TRQBDIV (0-896.729.5244). If you or someone you know talks about suicide or feeling hopeless, get help right away. Constipation and Hemorrhoids  · Drink plenty of fluids, enough so that your urine is light yellow or clear like water. If you have kidney, heart, or liver disease and have to limit fluids, talk with your doctor before you increase the amount of fluids you drink. · Eat plenty of fiber each day. Have a bran muffin or bran cereal for breakfast, and try eating a piece of fruit for a mid-afternoon snack. · For painful, itchy hemorrhoids, put ice or a cold pack on the area several times a day for 10 minutes at a time. Follow this by putting a warm compress on the area for another 10 to 20 minutes or by sitting in a shallow, warm bath. When should you call for help? Call 911 anytime you think you may need emergency care. For example, call if:  · You are thinking of hurting yourself, your baby, or anyone else. · You passed out (lost consciousness). · You have symptoms of a blood clot in your lung (called a pulmonary embolism). These may include:    · Sudden chest pain. · Trouble breathing. · Coughing up blood. Call your doctor now or seek immediate medical care if:  · You have severe vaginal bleeding. · You are soaking through a pad each hour for 2 or more hours.   · Your vaginal bleeding seems to be getting heavier or is still bright red 4 days after delivery. · You are dizzy or lightheaded, or you feel like you may faint. · You are vomiting or cannot keep fluids down. · You have a fever. · You have new or more belly pain. · You pass tissue (not just blood). · Your vaginal discharge smells bad. · Your belly feels tender or full and hard. · Your breasts are continuously painful or red. · You feel sad, anxious, or hopeless for more than a few days. · You have sudden, severe pain in your belly. · You have symptoms of a blood clot in your leg (called a deep vein thrombosis),          such as:  · Pain in your calf, back of the knee, thigh, or groin. · Redness and swelling in your leg or groin. · You have symptoms of preeclampsia, such as:  · Sudden swelling of your face, hands, or feet. · New vision problems (such as dimness or blurring). · A severe headache. · Your blood pressure is higher than it should be or rises suddenly. · You have new nausea or vomiting. Watch closely for changes in your health, and be sure to contact your doctor if you have any problems. If you had a  Section:    Your Recovery:  A  section, or , is surgery to deliver your baby through a cut, called an incision that the doctor makes in your lower belly and uterus. You may have some pain in your lower belly and need pain medicine for 1 to 2 weeks. You can expect some vaginal bleeding for several weeks. You will probably need about 6 weeks to fully recover. It is important to take it easy while the incision is healing. Avoid heavy lifting, strenuous activities, or exercises that strain the belly muscles while you are recovering. Ask a family member or friend for help with housework, cooking, and shopping. This care sheet gives you a general idea about how long it will take for you to recover. But each person recovers at a different pace.  Follow the steps below to get better as quickly as possible. How can you care for yourself at home? Activity  · Rest when you feel tired. Getting enough sleep will help you recover. · Try to walk each day. Start by walking a little more than you did the day before. Bit by bit, increase the amount you walk. Walking boosts blood flow and helps prevent pneumonia, constipation, and blood clots. · Avoid strenuous activities, such as bicycle riding, jogging, weightlifting, and aerobic exercise, for 6 weeks or until your doctor says it is okay. · Until your doctor says it is okay, do not lift anything heavier than your baby. · Do not do sit-ups or other exercise that strain the belly muscles for 6 weeks or until your doctor says it is okay. · Hold a pillow over your incision when you cough or take deep breaths. This will support your belly and decrease your pain. · You may shower as usual. Pat the incision dry when you are done. · You will have some vaginal bleeding. Wear sanitary pads. Do not douche or use tampons until your doctor says it is okay. · Ask your doctor when you can drive again. · You will probably need to take at least 6 weeks off work. It depends on the type of work you do and how you feel. · Wait until you are healed (about 4 to 6 weeks) before you have sexual intercourse. Your doctor will tell you when it is okay to have sex. · Talk to your doctor about birth control. You can get pregnant even before your period returns. Also, you can get pregnant while you are breast-feeding. Incision care  Your skin is your body's first line of defense against germs, but an incision site leaves an easy way for germs to enter your body. To prevent infection:  · Clean your hands frequently and before and after changing any touching any dressings. · If you have strips of tape on the incision, leave the tape on for a week or until it falls off. · Look at your incision closely every day for any changes.  Contact your doctor if you experience any signs of infection, such as fever or increased redness at the surgical site. · Wash the area daily with warm, soapy water, and pat it dry. Don't use hydrogen peroxide or alcohol, which can slow healing. You may cover the area with a gauze bandage if it weeps or rubs against clothing. Change the bandage every day. · Keep the area clean and dry. Diet  · You can eat your normal diet. If your stomach is upset, try bland, low-fat foods like plain rice, broiled chicken, toast, and yogurt. · Drink plenty of fluids (unless your doctor tells you not to). · You may notice that your bowel movements are not regular right after your surgery. This is common. Try to avoid constipation and straining with bowel movements. You may want to take a fiber supplement every day. If you have not had a bowel movement after a couple of days, ask your doctor about taking a mild laxative. · If you are breast-feeding, do not drink any alcohol. Medicines  · Take pain medicines exactly as directed. · If the doctor gave you a prescription medicine for pain, take it as prescribed. · If you are not taking a prescription pain medicine, ask your doctor if you can take an over-the-counter medicine such as acetaminophen (Tylenol), ibuprofen (Advil, Motrin), or naproxen (Aleve), for cramps. Read and follow all instructions on the label. Do not take aspirin, because it can cause more bleeding. Do not take acetaminophen (Tylenol) and other acetaminophen containing medications (i.e. Percocet) at the same time. · If you think your pain medicine is making you sick to your stomach:  · Take your medicine after meals (unless your doctor has told you not to). · Ask your doctor for a different pain medicine. · If your doctor prescribed antibiotics, take them as directed. Do not stop taking them just because you feel better. You need to take the full course of antibiotics.     Mental Health  · Many women get the \"baby blues\" during the first few days after childbirth. You may lose sleep, feel irritable, and cry easily. You may feel happy one minute and sad the next. Hormone changes are one cause of these emotional changes. Also, the demands of a new baby, along with visits from relatives or other family needs, add to a mother's stress. The \"baby blues\" often peak around the fourth day. Then they ease up in less than 2 weeks. · If your moodiness or anxiety lasts for more than 2 weeks, or if you feel like life is not worth living, you may have postpartum depression. This is different for each mother. Some mothers with serious depression may worry intensely about their infant's well-being. Others may feel distant from their child. Some mothers might even feel that they might harm their baby. A mother may have signs of paranoia, wondering if someone is watching her. · With all the changes in your life, you may not know if you are depressed. Pregnancy sometimes causes changes in how you feel that are similar to the symptoms of depression. · Symptoms of depression include:  · Feeling sad or hopeless and losing interest in daily activities. These are the most common symptoms of depression. · Sleeping too much or not enough. · Feeling tired. You may feel as if you have no energy. · Eating too much or too little. · [employer] (PSI) offers a Warm line; Chat with the Expert phone sessions; Information and Articles about Pregnancy and Postpartum Mood Disorders; Comprehensive List of Free Support Groups; Knowledgeable local coordinators who will offer support, information, and resources; Guide to Resources on [employer]; Calendar of events in the  mood disorders community; Latest News and Research; and [facility] [address] for [employer]. Remember - You are not alone; You are not to blame; With help, you will be well. 1-933-693-PPD(9579). WWW. POSTPARTUM. NET   · Writing or talking about death, such as writing suicide notes or talking about guns, knives, or pills. Keep the numbers for these national suicide hotlines: 2-295-584-TALK (9-841.442.7117) and 5-075-YXXOVPA (7-550.375.7512). If you or someone you know talks about suicide or feeling hopeless, get help right away. Other instructions  · If you breast-feed your baby, you may be more comfortable while you are healing if you place the baby so that he or she is not resting on your belly. Try tucking your baby under your arm, with his or her body along the side you will be feeding on. Support your baby's upper body with your arm. With that hand you can control your baby's head to bring his or her mouth to your breast. This is sometimes called the football hold. Follow-up care is a key part of your treatment and safety. Be sure to make and go to all appointments, and call your doctor if you are having problems. It's also a good idea to know your test results and keep a list of the medicines you take. When should you call for help? Call 911 anytime you think you may need emergency care. For example, call if:  · You are thinking of hurting yourself, your baby, or anyone else. · You passed out (lost consciousness). · You have symptoms of a blood clot in your lung (called a pulmonary embolism). These may include:  · Sudden chest pain. · Trouble breathing. · Coughing up blood. Call your doctor now or seek immediate medical care if:    · You have severe vaginal bleeding. · You are soaking through a pad each hour for 2 or more hours. · Your vaginal bleeding seems to be getting heavier or is still bright red 4 days after delivery. · You are dizzy or lightheaded, or you feel like you may faint. · You are vomiting or cannot keep fluids down. · You have a fever. · You have new or more belly pain. · You have loose stitches, or your incision comes open.     · You have symptoms of infection, such as:  · Increased pain, swelling, warmth, or redness. · Red streaks leading from the incision. · Pus draining from the incision. · A fever  · You pass tissue (not just blood). · Your vaginal discharge smells bad. · Your belly feels tender or full and hard. · Your breasts are continuously painful or red. · You feel sad, anxious, or hopeless for more than a few days. · You have sudden, severe pain in your belly. · You have symptoms of a blood clot in your leg (called a deep vein thrombosis), such as:  · Pain in your calf, back of the knee, thigh, or groin. · Redness and swelling in your leg or groin. · You have symptoms of preeclampsia, such as:  · Sudden swelling of your face, hands, or feet. · New vision problems (such as dimness or blurring). · A severe headache. · Your blood pressure is higher than it should be or rises suddenly. · You have new nausea or vomiting. Watch closely for changes in your health, and be sure to contact your doctor if you have any problems. All Moms should know about Hypertensive Disorders After Childbirth    What is preeclampsia? A woman with preeclampsia has blood pressure that is higher than usual. She may also have other serious symptoms. Preeclampsia can be dangerous. When it is severe, it can cause seizures (eclampsia) or liver or kidney damage. When the liver is affected, some women get HELLP syndrome, a blood-clotting and bleeding problem. HELLP can come on quickly and can be deadly. This is why your doctor checks you and your baby often. Preeclampsia usually occurs after 20 weeks of pregnancy. In rare cases, it is first noted right after childbirth. Most often, it starts near the end of pregnancy and goes away after childbirth. What are the symptoms? Mild preeclampsia usually doesn't cause symptoms. But preeclampsia can cause rapid weight gain and sudden swelling of the hands and face. Severe preeclampsia does cause symptoms.  It can cause a very bad headache and trouble seeing and breathing. It also can cause belly pain. You may also urinate less than usual.    If you have new preeclampsia symptoms after you go home from the hospital, call your doctor right away. What can you expect after you have had preeclampsia? In the hospital  After the baby and the placenta are delivered, preeclampsia usually starts to improve. Most women get better in the first few days after childbirth. After having preeclampsia, you still have a risk of seizures for a day or more after childbirth. (Very rarely, seizures happen later on.) So your doctor may have you take magnesium sulfate for a day or more to prevent seizures. You may also take medicine to lower your blood pressure. When you go home  Your blood pressure will most likely return to normal a few days after delivery. Your doctor will want to check your blood pressure sometime in the first week after you leave the hospital.    Some women still have high blood pressure 6 weeks after childbirth. But most return to normal levels over the long term. · Take and record your blood pressure at home if your doctor tells you to. · Learn the importance of the two measures of blood pressure (such as 120 over 80, or 120/80). The first number is the systolic pressure. This is the force of blood on the artery walls as the heart pumps. The second number is the diastolic pressure. This is the force of blood on the artery walls between heartbeats, when the heart is at rest. You have a choice of monitors to use. Manual monitor: You pump up the cuff and use a stethoscope to listen for your  Pulse. · Electronic monitor: The cuff inflates, and a gauge shows your pulse rate. · To take your blood pressure:  · Ask your doctor to check your blood pressure monitor to be sure that it is accurate and that the cuff fits you. Also ask your doctor to watch you use it, to make sure that you are using it right.   · You should not eat, use tobacco products, or use medicine known to raise blood pressure (such as some nasal decongestant sprays) before you take your blood pressure. · Avoid taking your blood pressure if you have just exercised or are nervous or upset. Rest at least 15 minutes before you take your blood pressure. · Be safe with medicines. If you take medicine, take it exactly as prescribed. Call your doctor if you think you are having a problem with your medicine. · Do not smoke. Quitting smoking will help lower your blood pressure and improve your baby's growth and health. If you need help quitting, talk to your doctor about stop-smoking programs and medicines. These can increase your chances of quitting for good. · Eat a balanced and healthy diet that has lots of fruits and vegetables. Long-term health   After you have had preeclampsia, you have a higher-than-average risk of heart disease, stroke, and kidney disease. This may be because the same things that cause preeclampsia also cause heart and kidney disease. To protect your health, work with your doctor on living a heart-healthy lifestyle and getting the checkups you need. Your doctor may also want you to check your blood pressure at home. Follow-up care is a key part of your treatment and safety. Be sure to make and go to all appointments, and call your doctor if you are having problems. It's also a good idea to know your test results and keep a list of the medicines you take. If you had Gestational Diabetes:    Most of the time, gestational diabetes goes away after a baby is born. But if you have had gestational diabetes, you have a greater chance of having it in a future pregnancy and of developing type 2 diabetes. To check for diabetes, you may have a follow-up glucose tolerance test 6 to 12 weeks after your baby is born or after you stop breast-feeding your baby. You may be able to control your blood sugar with a healthy diet and regular exercise.  Staying at a healthy weight also may keep you from getting type 2 diabetes later on. If diet and exercise do not lower your blood sugar enough, you may need to take insulin shots. Insulin is safe during pregnancy. Preventing Infection at Home    We care about preventing infection and avoiding the spread of germs - not only when you are in the hospital but also when you return home. When you return home from the hospital, it's important to take the following steps to help prevent infection and avoid spreading germs that could infect you and others. Ask everyone in your home to follow these guidelines, too. Clean Your Hands  · Clean your hands whenever your hands are visibly dirty, before you eat, before or after touching your mouth, nose or eyes, and before preparing food. Clean them after contact with body fluids, using the restroom, touching animals or changing diapers. · When washing hands, wet them with warm water and work up a lather. Rub hands for at least 15 seconds, then rinse them and pat them dry with a clean towel or paper towel. · When using hand sanitizers, it should take about 15 seconds to rub your hands dry. If not, you probably didn't apply enough . Cover Your Sneeze or Cough  Germs are released into the air whenever you sneeze or cough. To prevent the spread of infection:  · Turn away from other people before coughing or sneezing. · Cover your mouth or nose with a tissue when you cough or sneeze. Put the tissue in the trash. · If you don't have a tissue, cough or sneeze into your upper sleeve, not your hands. · Always clean your hands after coughing or sneezing. Care for Wounds  Your skin is your body's first line of defense against germs, but an open wound leaves an easy way for germs to enter your body. To prevent infection:  · Clean your hands before and after changing wound dressings, and wear gloves to change dressings if recommended by your doctor.   · Take special care with IV lines or other devices inserted into the body. If you must touch them, clean your hands first.  · Follow any specific instructions from your doctor to care for your wounds. Contact your doctor if you experience any signs of infection, such as fever or increased redness at the surgical or wound site. Keep a Clean Home  · Clean or wipe commonly touched hard surfaces like door handles, sinks, tabletops, phones and TV remotes. · Use products labeled \"disinfectant\" to kill harmful bacteria and viruses. · Use a clean cloth or paper towel to clean and dry surfaces. Wiping surfaces with a dirty dishcloth, sponge or towel will only spread germs. · Never share toothbrushes, pacheco, drinking glasses, utensils, razor blades, face cloths or bath towels to avoid spreading germs. · Be sure that the linens that you sleep on are clean. · Keep pets away from wounds and wash your hands after touching pets, their toys or bedding. We care about you and your health. Remember, preventing infections is a   team effort between you, your family, friends and health care providers. Postpartum Support    PARENTS:  Are you feeling sad or depressed? Is it difficult for you to enjoy yourself? Do you feel more irritable or tense? Do you feel anxious or panicky? Are you having difficulty bonding with your baby? Do you feel as if you are \"out of control\" or \"going crazy\"? Are you worried that you might hurt your baby or yourself? FAMILIES: Do you worry that something is wrong but don't know how to help? Do you think that your partner or spouse is having problems coping? Are you worried that it may never get better? While many women experience some mild mood change or \"the blues\" during or after the birth of a child, 1 in 9 women experience more significant symptoms of depression or anxiety. 1 in 10 Dads become depressed during the first year. Things you can do  Being a good parent includes taking care of yourself.   If you take care of yourself, you will be able to take better care of your baby and your family. · Talk to a counselor or healthcare provider who has training in  mood and anxiety problems. · Learn as much as you can about pregnancy and postpartum depression and anxiety. · Get support from family and friends. Ask for help when you need it. · Join a support group in your area or online. · Keep active by walking, stretching or whatever form of exercise helps you to feel better. · Get enough rest and time for yourself. · Eat a healthy diet. · Don't give up! It may take more than one try to get the right help you need. These are general instructions for a healthy lifestyle:    No smoking/ No tobacco products/ Avoid exposure to second hand smoke    Surgeon General's Warning:  Quitting smoking now greatly reduces serious risk to your health. Obesity, smoking, and sedentary lifestyle greatly increases your risk for illness    A healthy diet, regular physical exercise & weight monitoring are important for maintaining a healthy lifestyle    Recognize signs and symptoms of STROKE:    F-face looks uneven    A-arms unable to move or move unevenly    S-speech slurred or non-existent    T-time-call 911 as soon as signs and symptoms begin - DO NOT go       back to bed or wait to see if you get better - TIME IS BRAIN. I have had the opportunity to make my options or choices for discharge. I have received and understand these instructions. Patient Education        Después del parto (período de posparto): Instrucciones de cuidado  After Your Delivery (the Postpartum Period): Care Instructions  Instrucciones de cuidado    Felicidades por el nacimiento de adair bebé. Al igual que el Caitlin, el tiempo con el recién nacido puede ser un momento de [place], Herlene Blend y agotamiento. Es posible que se sienta dubose al mirar la elisa de adair pequeño bebé.  También podría sentirse abrumada por adair nuevo ritmo de sueño y las nuevas responsabilidades. Al principio, los bebés suelen dormir zoe el día y permanecen despiertos zoe la noche. No tienen ningún patrón ni rutina. Podrían lorelei gritos ahogados, sacudirse y despertarse, o parecer shelia si tuvieran los ojos cruzados (bizcos). Todo esto es normal, e incluso la pueden hacer sonreír. Zoe las primeras semanas siguientes al parto, trate de cuidarse hemal. Podría tardar de 4 a 6 semanas en volver a sentirse usted misma, y posiblemente más tiempo si le bhardwaj hecho apurva cesárea. Es probable que se sienta muy fatigada zoe varias semanas. Alecia días estarán llenos de Michaela, walter también de mucha alegría. La atención de seguimiento es apurva parte clave de adair tratamiento y seguridad. Asegúrese de hacer y acudir a todas las citas, y llame a adair médico si está teniendo problemas. También es apurva buena idea saber los resultados de alecia exámenes y mantener apurva lista de los medicamentos que shannon. ¿Cómo puede cuidarse en el hogar? Cuide adair cuerpo después del parto  · Utilice toallas sanitarias en vez de tampones para las pérdidas de stanton que podrían durar hasta 2 semanas. · Alivie los cólicos con ibuprofeno (Advil, Motrin). · Alivie el dolor de las hemorroides y la asif entre la vagina y el recto con compresas de hielo o de infusión de hamamelis Joel Emmanuel (\"witch hazel\"). · Alivie el estreñimiento bebiendo mucho líquido y comiendo alimentos ricos en fibra. Pregúntele a adair médico acerca de los ablandadores de heces de [place]. · Límpiese con un chorrito suave de agua tibia de apurva botella en vez de hacerlo con papel higiénico.  · Steele un baño de asiento en agua tibia varias veces al día. · Use un buen sostén de lactancia. Alivie el dolor y la hinchazón de los senos con toallitas de aseo húmedas tibias. · Si no está amamantando, utilice hielo en lugar de calor para aliviar el dolor de los senos.   · Si está amamantando, adair período menstrual podría no comenzar Servando santoro varios meses. Es posible que, al principio, stanton más y [place] de lo que lo hacía antes del Ofelia Dragon. · Espere hasta que haya sanado (de 4 a 6 semanas) antes de volver a tener relaciones sexuales. Adair médico le dirá cuándo puede tener relaciones sexuales. · Trate de no viajar con el bebé zoe las primeras 5 o 6 semanas. Si hace un viaje curt en automóvil, mandy paradas frecuentes para caminar y estirarse. Evite el agotamiento  · Descanse todos los días. Trate de dormir la siesta cuando adair bebé también lo mandy. · Pídale a otro adulto que la acompañe por unos michael después del [place]. · Planifique el cuidado de los niños si tiene otros hijos. · Sea flexible para que pueda comer a horas fuera de lo común y dormir cuando lo necesite. Tanto usted shelia adair bebé están creando horarios nuevos. · Planee pequeñas salidas para estar fuera de casa. El cambio podría hacer que se sienta menos fatigada. · Pida ayuda para cocinar y [address] hogar y las compras. Recuerde que adair principal tarea consiste en cuidar a adair bebé. Sepa qué ayuda puede recibir en skyler de tener depresión posparto  · La depresión posparto es común zoe las primeras 1 o 2 semanas siguientes al parto. Podría llorar o sentirse lenore o irritable sin razón alguna. · Descanse cada vez que pueda hacerlo. Estar fatigada dificulta manejar las emociones. · Salga a caminar con adair bebé. · Hable con adair shasta, alecia amigos y laecia familiares acerca de alecia sentimientos. · Si alecia síntomas tang más de unas pocas semanas, o si se siente muy deprimida, pídale ayuda a adair médico.  · La depresión posparto puede tratarse. Los grupos de apoyo y la asesoría psicológica pueden ser Samara Nailer. A veces, los medicamentos también pueden ayudar. Manténgase saludable  · Coma alimentos saludables para tener más energía y adelgazar las libras adicionales que engordó con el bebé. · Si amamanta, evite las drogas.  Si dejó de fumar zoe el embarazo, trate de no volver a fumar. Si opta por adin apurva bebida alcohólica de vez en cuando, solo tome apurva bebida y limite la cantidad de ocasiones en que aida alcohol. Después de beber alcohol, espere al menos 2 horas antes de amamantar para reducir la cantidad de alcohol que el bebé pueda recibir a través de la [place]. · Comience a hacer ejercicios diarios después de 4 a 6 semanas, waltre descanse cuando se sienta fatigada. · Aprenda ejercicios para tonificar el abdomen. Ochoa ejercicios de Kegel para recuperar la fuerza de los músculos pélvicos. Puede hacer los ejercicios de Kegel mientras está de pie o sentada. ? Apriete los mismos músculos que usted usaría para detener la [place]. El abdomen y los muslos no deben moverse. ? Manténgalos apretados zoe 3 segundos y, luego, relájelos otros 3 segundos. ? Empiece con 3 segundos. [place], añada 1 jg cada semana hasta que sea capaz de apretar zoe 10 segundos. ? Repita el ejercicio entre 10 y 13 veces [address] Ochoa dallin o más sesiones cada día. · Busque apurva clase para nuevas madres y recién nacidos que tenga un tiempo de ejercicio. · Si le bhardwaj hecho apurva cesárea, dese un poco más de tiempo antes de hacer ejercicio, y tenga cuidado. ¿Cuándo debe pedir ayuda? Llame al 911 en cualquier momento que considere que necesita atención de [place]. Por ejemplo, llame si:    · Tiene pensamientos de lastimarse o de hacerle daño a adair bebé o a otra persona.     · Se desmayó (perdió el conocimiento).     · Tiene dolor en el pecho, le falta el aire o tose stanton.     · Tiene convulsiones. Llame a adair médico ahora mismo o busque atención médica de inmediato si:    · Tiene sangrado vaginal intenso.  [place] significa que está expulsando coágulos sanguíneos y empapando apurva toalla sanitaria cada hora por 2 horas o más.     · Está mareada o aturdida, o siente shelia que se puede desmayar.     · Tiene fiebre.     · Tiene dolor abdominal nuevo o más intenso.     · Tiene señales de un coágulo de stanton en la pierna (que se llama trombosis venosa profunda), shelia:  ? Dolor en la pantorrilla, el muslo, la ochoa o detrás de la rodilla. ? Enrojecimiento e hinchazón en la pierna o la ochoa.     · Tiene señales de preeclampsia, shelia:  ? Hinchazón repentina de la mark, las rico o los pies. ? Nuevos problemas de la vista (shelia oscurecimiento, twin borroso o twin puntos). ? Dolor de deidra intenso. Preste especial atención a los cambios en adair honorio y asegúrese de comunicarse con adair médico si:    · Adair sangrado vaginal parece volverse más intenso.     · Tiene flujo vaginal nuevo o que empeora.     · Se siente lenore, ansiosa o sin esperanzas zoe más de unos pocos días.     · No mejora shelia se esperaba. ¿Dónde puede encontrar más información en inglés? Renard Mars a [email or web address]  Darell Lester I310 en la búsqueda para aprender más acerca de \"Después del parto (período de posparto): Instrucciones de cuidado. \"  Revisado: 8 octubre, 2020               Versión del contenido: 12.8  © 2006-2021 Healthwise, Incorporated. Las instrucciones de cuidado fueron adaptadas bajo licencia por [employer] (which disclaims liability or warranty for this information). Si usted tiene Sidney McNeal afección médica o sobre estas instrucciones, siempre pregunte a adair profesional de honorio. Healthwise, Incorporated niega toda garantía o responsabilidad por adair uso de esta información.

## 2021-04-24 NOTE — PROGRESS NOTES
Called to evaluate pt for \"jaw pain\"  When rounding this am pt not complaining of pain   Reports yesterday pain was on both sides, now Right has resolved and Left is only hurting, with opening and closing jaw  Probable jaw strain with clinching jaw during L&D  Pt agrees to f/u with PCP/Urgent care on Monday if still hurting  Rx sent for Percocet #10, but instructed that Motrin will probably be more effective since it is an anti-inflammatory    RIRI Bautista/RANDY

## 2021-04-24 NOTE — LACTATION NOTE
This note was copied from a baby's chart. Spoke with parents using  Naina Jurado #[id number]) Mom states she plans to bottle feed even though she has put the baby to the breast several times. We talked about ways to help decrease milk production. Mom verbalized understanding and has no further questions for lactation.

## 2021-04-24 NOTE — PROGRESS NOTES
Postpartum Note    S/P , PPD#2  Ambulating and Voiding without diff  Elliot po and po meds well  Breastfeeding well established  Desires discharge home    O:  A,A&O x 3        VSS, Afebrile         Patient Vitals for the past 24 hrs:   Temp Pulse Resp BP   21 0445 97.9 °F (36.6 °C) 72 14 100/62   21 2200 98.2 °F (36.8 °C) 75 16 (!) 91/56   21 1845 98.2 °F (36.8 °C) 84 15 104/70   21 0958 98 °F (36.7 °C) 80 16 (!) 101/59          Breasts soft, NT       Abd soft, NT/ND       FF@ U-1, ML       Scant Lochia Rubra       Perineum Intact       Ext without REP, Neg Derek's    A/P: Routine PP care          Discharge home in stable cond. RTO 6 wks for PP exam, sooner prn                 RIRI Brown/RANDY

## 2021-04-24 NOTE — PROGRESS NOTES
Bedside and Verbal shift change report given to VANE Marcial RN (oncoming nurse) by LEANNE Hopper RN (offgoing nurse). Report included the following information SBAR.     7942: Pt is complaining of L jaw pain. Pt is pointing to the area right  In front of her L ear. Pt states that when she clamps her mouth down, she has numbness, and when she opened her mouth, it is sharp/shooting pain. Vital sounds WDL, pulse ox: 97% on room air. Pt denies: SOB, chest pain, heart palpitations, and  shoulder pain. Nurse called and spoke to Stephon Gaspar MD wants to see how her ear/jaw feels after the pain medication kicks in. Heat was applied to site, and pt is encourage to follow-up with her PCP after she is discharge. Nurse was speaking to using a Moroccan interpretor, interpretor number: [id number].    1140: Nurse is speaking to pt using Moroccan Interpretor, interpretor #[id number]. Yenny Rodgers CNM at bedside assessing pt. MD states that pt may be discharged with a Monday follow-up with her pcp if her pain persists. Pt states that her pain is already much better since the medication. See Yenny Rodgers note. 1230: I have reviewed discharge instructions with the patient. The patient verbalized understanding. nurse spoke to Pt using the  line, interpretor number: [id number]. Pt received a manual breast pump.

## 2021-05-05 ENCOUNTER — TELEPHONE (OUTPATIENT)
Dept: OBGYN CLINIC | Age: 31
End: 2021-05-05

## 2021-05-05 NOTE — TELEPHONE ENCOUNTER
Called and spoke with patient. Discussed [facility] policy limiting office visits due to Covid-19 at this time. Patient doing well now ~2 weeks post-partum from vaginal.  Bonding with baby Hildred Jay. Some body aches and pains- just takes the Motrin. Breast and bottle feeding. Lochia appropriate. Mood stable- no signs or concerns about depression. Patient states she is just tired at this time, but doing well. Patient states she swelling in her feet has gotten better. Has not been SA. Reminded patient no sex until 6 weeks post partum. Discussed and scheduled annual in 4 months. Fabiola [address]

## 2021-06-04 ENCOUNTER — TELEPHONE (OUTPATIENT)
Dept: OBGYN CLINIC | Age: 31
End: 2021-06-04

## 2021-06-04 NOTE — TELEPHONE ENCOUNTER
Called and spoke with patient. Discussed [facility] policy limiting office visits due to Covid-19 at this time. Patient doing well now ~6 weeks post-partum from vaginal.  Bonding with baby Bard Cruz. Denies pain. Breast and Bottle feeding. Lochia appropriate. Mood stable- no signs or concerns about depression. Has been SA. Discussed contraception, she plans on condoms. Patient is planning on getting the IUD placed. Encouraged to activate MyChart if not already active. Discussed and scheduled annual in 4 months. Jacinto [address]

## 2021-06-10 ENCOUNTER — TELEPHONE (OUTPATIENT)
Dept: OBGYN CLINIC | Age: 31
End: 2021-06-10

## 2021-06-10 NOTE — TELEPHONE ENCOUNTER
Called and spoke with patient. Checked in with her as to why she was scheduled for a visit today. Patient states she is doing well and still planning on getting an IUD placed. I explained to her that she is too early for the IUD placement and we need it to be as close to 10 weeks as possible for the placement. Patient states she is loosing her insurance at the end of the month so I scheduled her for an IUD placement visit on 06/30. Patient verbalized understanding and is ok to cancel todays visit. Patient to reach before appointment if she has and questions or concerns.

## 2021-06-30 ENCOUNTER — OFFICE VISIT (OUTPATIENT)
Dept: OBGYN CLINIC | Age: 31
End: 2021-06-30
Payer: MEDICAID

## 2021-06-30 VITALS — DIASTOLIC BLOOD PRESSURE: 64 MMHG | SYSTOLIC BLOOD PRESSURE: 110 MMHG

## 2021-06-30 DIAGNOSIS — Z30.430 ENCOUNTER FOR IUD INSERTION: Primary | ICD-10-CM

## 2021-06-30 DIAGNOSIS — Z01.419 ENCOUNTER FOR GYNECOLOGICAL EXAMINATION WITHOUT ABNORMAL FINDING: ICD-10-CM

## 2021-06-30 DIAGNOSIS — Z23 ENCOUNTER FOR IMMUNIZATION: ICD-10-CM

## 2021-06-30 LAB
HCG URINE, QL. (POC): NEGATIVE
VALID INTERNAL CONTROL?: YES

## 2021-06-30 PROCEDURE — 99395 PREV VISIT EST AGE 18-39: CPT | Performed by: OBSTETRICS & GYNECOLOGY

## 2021-06-30 PROCEDURE — 90651 9VHPV VACCINE 2/3 DOSE IM: CPT | Performed by: OBSTETRICS & GYNECOLOGY

## 2021-06-30 PROCEDURE — 81025 URINE PREGNANCY TEST: CPT | Performed by: OBSTETRICS & GYNECOLOGY

## 2021-06-30 PROCEDURE — 90471 IMMUNIZATION ADMIN: CPT | Performed by: OBSTETRICS & GYNECOLOGY

## 2021-06-30 PROCEDURE — 58300 INSERT INTRAUTERINE DEVICE: CPT | Performed by: OBSTETRICS & GYNECOLOGY

## 2021-06-30 NOTE — PROGRESS NOTES
IUD INSERTION NOTE    Chart reviewed for the following:  I have reviewed the History & Physical and updated Jazmyn Calvo allergies. TIME OUT performed immediately prior to start of procedure:  I performed the following reviews on Jazmyn Calvo prior to the start of the procedure:  Patient was identified by name and date of birth   Agreement on procedure being performed was verified  Risks and Benefits explained to the patient  Consent was signed and verified  Time: 11:30AM  Date of procedure: 6/30/2021  Procedure performed by: Dr. Juice Stone  How tolerated by patient: Well  Post Procedural Pain Scale: 2-Hurts Minimally  Comments: None    Indications:  Jazmyn Calvo is a 27 y.o., G5 [id number], whose No LMP recorded. , presents for insertion of an IUD. The risks, benefits and alternatives of IUD insertion were discussed in detail at last visit and at this visit. She also has reviewed IUD information herself. She has elected to proceed with the insertion today and she states she has no further questions. She signed the consent form. A urine pregnancy test was negative. Procedure:  She was placed in dorsal lithotomy position. On bimanual exam the uterus was anteverted and normal in size with no tenderness present. A speculum was inserted into the vagina and the cervix was visualized. The anterior lip of the cervix was not grasped with an Dorota clamp. The uterus was sounded with a Lemos sound to 7 centimeters. A MIRENA IUD was then inserted without difficulty per  instructions. The string was cut to 3 centimeters. She experienced a mild amount of cramping. Post Procedure Status:   She tolerated the procedure with mild discomfort. The patient was observed for 10 minutes after the insertion. There were no complications. Patient was discharged in stable condition. She understands the IUD will not protect from sexually transmitted diseases including HIV.  She also understands she may have heavier/irregular bleeding for for the first 3-6 months with her IUD but that periods generally diminish in amount thereafter. She also understands the IUD must be removed before 5 years from today. She will contact us if she experiences  Any significant or increasing bleeding, pain, fever, shaking, chills or any other concerning signs or symptoms. She will also contact us or seek care immediately if she is or thinks she is pregnant. See order report for IUD lot number. Annual exam    Hedy Tuttle is a 27 y.o. presenting for annual exam.   Her main concerns today include getting her IUD placed. Ob/Gyn Hx:    Patient's last menstrual period was 2021 (approximate). Menses- regular monthly cycles? yes, Bothersome? no  Contraception-none  STI- declined  SA-yes    Health maintenance:  Pap-today  Mammo-N/A  Colonoscopy- N/A  Gardasil-1/3- first one today. Past Medical History:   Diagnosis Date    Abnormal Papanicolaou smear of cervix     ASCUS; will follow up after delivery    Anemia     on iron    Gastritis     Gestational hypertension     patient stated that she has had high blood pressure in pregnancy previously    Postpartum depression     previously took medication; doesn't remember what medicine       No past surgical history on file. No family history on file. Social History     Socioeconomic History    Marital status:      Spouse name: Not on file    Number of children: Not on file    Years of education: Not on file    Highest education level: Not on file   Occupational History    Not on file   Tobacco Use    Smoking status: Never Smoker    Smokeless tobacco: Never Used   Substance and Sexual Activity    Alcohol use: Not Currently     Comment:  Occ    Drug use: Not Currently    Sexual activity: Yes     Partners: Male   Other Topics Concern     Service Not Asked    Blood Transfusions Not Asked    Caffeine Concern Not Asked    Occupational Exposure Not Asked   Jeanette Peoples Hazards Not Asked    Sleep Concern Not Asked    Stress Concern Not Asked    Weight Concern Not Asked    Special Diet Not Asked    Back Care Not Asked    Exercise Not Asked    Bike Helmet Not Asked   [address] Not Asked    Self-Exams Not Asked   Social History Narrative    Not on file     Social Determinants of Health     Financial Resource Strain:     Difficulty of Paying Living Expenses:    Food Insecurity:     Worried About Running Out of Food in the Last Year:     [address] in the Last Year:    Transportation Needs:     Lack of Transportation (Medical):  Lack of Transportation (Non-Medical):    Physical Activity:     Days of Exercise per Week:     Minutes of Exercise per Session:    Stress:     Feeling of Stress :    Social Connections:     Frequency of Communication with Friends and Family:     Frequency of Social Gatherings with Friends and Family:     Attends Synagogue Services:     Active Member of Clubs or Organizations:     Attends Club or Organization Meetings:     Marital Status:    Intimate Partner Violence:     Fear of Current or Ex-Partner:     Emotionally Abused:     Physically Abused:     Sexually Abused:        Current Outpatient Medications   Medication Sig Dispense Refill    ibuprofen (MOTRIN) 800 mg tablet Take 1 Tab by mouth every eight (8) hours as needed for Pain. (Patient not taking: Reported on 6/30/2021) 40 Tab 1    ferrous sulfate (IRON PO) Take  by mouth. (Patient not taking: Reported on 6/30/2021)      PNV Combo #19-Iron-Fol Ac-DHA 22-6-1-200 mg cap Take 1 Tab by mouth daily.  (Patient not taking: Reported on 6/30/2021) 90 Cap 4       No Known Allergies      Physical Exam    Visit Vitals  /64 (BP 1 Location: Left upper arm, BP Patient Position: Sitting)   LMP 06/09/2021 (Approximate)   Breastfeeding No       Constitutional  · Appearance: well-nourished, well developed, alert, in no acute distress    HENT  · Head and Face: appears normal    Neck  · Inspection/Palpation: normal appearance, no masses or tenderness  · Lymph Nodes: no lymphadenopathy present  · Thyroid: gland size normal, nontender, no nodules or masses present on palpation    Chest  · Respiratory Effort: non-labored breathing  · Auscultation: CTAB, normal breath sounds    Cardiovascular  · Heart:  · Auscultation: regular rate and rhythm without murmur  · Extremities: no peripheral edema    Breasts  · def    Gastrointestinal  · Abdominal Examination: abdomen non-tender to palpation, normal bowel sounds, no masses present  · Liver and spleen: no hepatomegaly present, spleen not palpable  · Hernias: no hernias identified    Genitourinary  · External Genitalia: normal appearance for age, no discharge present, no tenderness present, no inflammatory lesions present, no masses present, no atrophy present  · Vagina: normal vaginal vault without central or paravaginal defects, no discharge present, no inflammatory lesions present, no masses present  · Bladder: non-tender to palpation  · Urethra: appears normal  · Cervix: normal  · Perineum: perineum within normal limits, no evidence of trauma, no rashes or skin lesions present    Skin  · General Inspection: no rash, no lesions identified    Neurologic/Psychiatric  · Mental Status:  · Orientation: grossly oriented to person, place and time  · Mood and Affect: mood normal, affect appropriate      Assessment/Plan:  27 y.o. overall doing well. Health Maintenance:  -counseled re: diet, exercise, healthy lifestyle  -pap/HPV sent  -STI testing   DECLINED  -Gardasil- first one today. RTC: 1 year for annual wellness assessment, or sooner prn for problems or concerns  -handouts and instructions provided    After obtaining consent, and per orders of Dr. Arie Gleason, injection of Gardasil 1/3 given by Karri Gooden MA.  Patient instructed to remain in clinic for 20 minutes afterwards, and to report any adverse reaction to me immediately. Patient encouraged to make appointment today for next injection, Gardasil 2/3, due in 2 months.      Gardasil  : MECLUB  Site: Left Deltoid  Route: Intramuscular  Dose: 0.5mL  Lot#: [id number]  Exp date: 02/27/2023  DVD:[id number]

## 2021-06-30 NOTE — PATIENT INSTRUCTIONS
Inserción de un dispositivo intrauterino (DIU): Instrucciones de cuidado  Intrauterine Device (IUD) Insertion: Care Instructions  Instrucciones de cuidado     El dispositivo intrauterino (DIU) es un método anticonceptivo muy eficaz. Es un dispositivo pequeño de plástico, con forma de T, que contiene cobre u hormonas. El [employer] coloca el DIU en el [place]. Si usted y adair médico hablan de ello antes del parto, esto puede hacerse inmediatamente después de que dé a nura a adair bebé. Docia Areas cuerda de plástico atada al extremo del DIU cuelga a través del erum uterino hasta la vagina. [facility] de [place]. El DIU de cobre funciona por un yasmin de [address] El DIU hormonal puede funcionar por 3 años o 6 años, según cuál sea el DIU que se use. Sheryl adair médico puede hablar con usted acerca de dejarlo colocado por más tiempo. El DIU hormonal también reduce el sangrado y los cólicos [place]. Ambos tipos de DIU funcionan más probablemente al dañar o destruir los espermatozoides del hombre. [place] significa que el óvulo de la abhi no puede unirse a un espermatozoide. Los DIU también pueden alterar la mucosa del útero para que el óvulo no pueda implantarse. La atención de seguimiento es apurva parte clave de adair tratamiento y seguridad. Asegúrese de hacer y acudir a todas las citas, y llame a adair médico si está teniendo problemas. También es apurva buena idea saber los resultados de alecia exámenes y mantener apurva lista de los medicamentos que shannon. Cómo puede cuidarse en el hogar? · Es seguro de usar zoe el amamantamiento. · Es posible que sienta algunos cólicos leves y un sangrado ligero (manchado) zoe 1 o 2 días. Para el dolor abdominal, use apurva bolsa de [place] caliente o apurva almohadilla térmica ajustada a baja temperatura. · [place] un analgésico (medicamento para el dolor) de venta aspen, shelia acetaminofén (Tylenol), ibuprofeno (Advil, Motrin) y naproxeno (Aleve), si es necesario.  [place] y siga todas las instrucciones de la etiqueta. · No tome dos o más analgésicos al [employer], a menos que el médico se lo haya indicado. Muchos analgésicos contienen acetaminofén, es decir, Tylenol. El exceso de acetaminofén (Tylenol) puede ser dañino. · Si desea revisar el cordón de adair DIU, introdúzcase un dedo en la vagina y palpe el erum uterino, el cual está arriba de la vagina y se siente más brissa que el zahida de la vagina. Usted debería poder sentir el cordón ochoa y de plástico que sale de la abertura del erum del [place]. Si no puede palpar el cordón, use otra forma de anticonceptivo y mandy apurva edi con el médico para que le revise el cordón. · Si se sale el DIU, guárdelo y llame a adair médico. Asegúrese de utilizar otro método anticonceptivo mientras no tenga el DIU. · Use condones de látex para protegerse contra las infecciones de transmisión sexual (STI, por alecia siglas en inglés), shelia la gonorrea y la clamidia. El DIU no la protege contra las STI. Tener apurva odessa shasta sexual (que no tenga STI y que no tenga relaciones sexuales con nadie más) es apurva manera buena de evitar las STI. Cuándo debe pedir ayuda? Llame al 911 en cualquier momento que considere que necesita atención de [place]. Por ejemplo, llame si:    · Se desmayó (perdió el conocimiento).     · Tiene dolor repentino e intenso en el abdomen o la pelvis. Llame a adair médico ahora mismo o busque atención médica inmediata si:    · Tiene un dolor nuevo en el abdomen o la pelvis.     · Tiene sangrado vaginal intenso. [place] significa que empapa las toallas sanitarias o los tampones que suele usar cada hora, zoe 2 o más horas.     · Siente mareos o aturdimiento, o que está a punto de desmayarse.     · Tiene fiebre y dolor pélvico o flujo vaginal.     · Tiene dolor pélvico que SAMMIE.    Preste especial atención a los cambios en adair honorio y asegúrese de comunicarse con adair médico si:    · No puede sentir el cordón o el DIU se sale.     · Siente el estómago revuelto, o vomita.     · Piensa que podría estar embarazada. Dónde puede encontrar más información en inglés? Kelsea Modi a http://www.holder.com/  Reji Lockhart L427 en la búsqueda para aprender más acerca de \"Inserción de un dispositivo intrauterino (DIU): Instrucciones de cuidado. \"  Revisado: 8 octubre, 2020               Versión del contenido: 12.8  © 0156-0703 Healthwise, Incorporated. Las instrucciones de cuidado fueron adaptadas bajo licencia por [facility] (which disclaims liability or warranty for this information). Si usted tiene [place] Keller afección médica o sobre estas instrucciones, siempre pregunte a adair profesional de honorio. Healthwise, Incorporated niega toda garantía o responsabilidad por adair uso de esta información.

## 2021-07-03 LAB
CYTOLOGIST CVX/VAG CYTO: ABNORMAL
CYTOLOGY CVX/VAG DOC CYTO: ABNORMAL
CYTOLOGY CVX/VAG DOC THIN PREP: ABNORMAL
DX ICD CODE: ABNORMAL
DX ICD CODE: ABNORMAL
HPV I/H RISK 4 DNA CVX QL PROBE+SIG AMP: POSITIVE
Lab: ABNORMAL
OTHER STN SPEC: ABNORMAL
PATHOLOGIST CVX/VAG CYTO: ABNORMAL
STAT OF ADQ CVX/VAG CYTO-IMP: ABNORMAL

## 2021-07-05 PROCEDURE — 99284 EMERGENCY DEPT VISIT MOD MDM: CPT

## 2021-07-05 PROCEDURE — 96360 HYDRATION IV INFUSION INIT: CPT

## 2021-07-06 ENCOUNTER — TELEPHONE (OUTPATIENT)
Dept: OBGYN CLINIC | Age: 31
End: 2021-07-06

## 2021-07-06 ENCOUNTER — HOSPITAL ENCOUNTER (EMERGENCY)
Age: 31
Discharge: HOME | End: 2021-07-06
Attending: EMERGENCY MEDICINE
Payer: MEDICAID

## 2021-07-06 ENCOUNTER — APPOINTMENT (OUTPATIENT)
Dept: ULTRASOUND IMAGING | Age: 31
End: 2021-07-06
Attending: NURSE PRACTITIONER
Payer: MEDICAID

## 2021-07-06 VITALS
HEIGHT: 60 IN | SYSTOLIC BLOOD PRESSURE: 123 MMHG | OXYGEN SATURATION: 99 % | HEART RATE: 96 BPM | TEMPERATURE: 98.1 F | BODY MASS INDEX: 33.01 KG/M2 | RESPIRATION RATE: 17 BRPM | DIASTOLIC BLOOD PRESSURE: 81 MMHG

## 2021-07-06 DIAGNOSIS — J34.89 RHINORRHEA: ICD-10-CM

## 2021-07-06 DIAGNOSIS — N93.9 VAGINAL BLEEDING: Primary | ICD-10-CM

## 2021-07-06 DIAGNOSIS — R53.1 WEAKNESS: ICD-10-CM

## 2021-07-06 DIAGNOSIS — N83.202 CYST OF LEFT OVARY: ICD-10-CM

## 2021-07-06 LAB
ALBUMIN SERPL-MCNC: 4.5 G/DL (ref 3.5–5)
ALBUMIN/GLOB SERPL: 1.3 {RATIO} (ref 1.1–2.2)
ALP SERPL-CCNC: 134 U/L (ref 45–117)
ALT SERPL-CCNC: 85 U/L (ref 12–78)
ANION GAP SERPL CALC-SCNC: 8 MMOL/L (ref 5–15)
APPEARANCE UR: CLEAR
AST SERPL-CCNC: 51 U/L (ref 15–37)
BACTERIA URNS QL MICRO: NEGATIVE /HPF
BASOPHILS # BLD: 0 K/UL (ref 0–0.1)
BASOPHILS NFR BLD: 0 % (ref 0–1)
BILIRUB SERPL-MCNC: 0.4 MG/DL (ref 0.2–1)
BILIRUB UR QL: NEGATIVE
BUN SERPL-MCNC: 9 MG/DL (ref 6–20)
BUN/CREAT SERPL: 16 (ref 12–20)
CALCIUM SERPL-MCNC: 8.9 MG/DL (ref 8.5–10.1)
CHLORIDE SERPL-SCNC: 108 MMOL/L (ref 97–108)
CO2 SERPL-SCNC: 24 MMOL/L (ref 21–32)
COLOR UR: ABNORMAL
CREAT SERPL-MCNC: 0.55 MG/DL (ref 0.55–1.02)
DIFFERENTIAL METHOD BLD: ABNORMAL
EOSINOPHIL # BLD: 0.1 K/UL (ref 0–0.4)
EOSINOPHIL NFR BLD: 1 % (ref 0–7)
EPITH CASTS URNS QL MICRO: ABNORMAL /LPF
ERYTHROCYTE [DISTWIDTH] IN BLOOD BY AUTOMATED COUNT: 18.4 % (ref 11.5–14.5)
GLOBULIN SER CALC-MCNC: 3.5 G/DL (ref 2–4)
GLUCOSE SERPL-MCNC: 111 MG/DL (ref 65–100)
GLUCOSE UR STRIP.AUTO-MCNC: NEGATIVE MG/DL
HCT VFR BLD AUTO: 37.2 % (ref 35–47)
HGB BLD-MCNC: 12.6 G/DL (ref 11.5–16)
HGB UR QL STRIP: NEGATIVE
HYALINE CASTS URNS QL MICRO: ABNORMAL /LPF (ref 0–5)
IMM GRANULOCYTES # BLD AUTO: 0 K/UL (ref 0–0.04)
IMM GRANULOCYTES NFR BLD AUTO: 0 % (ref 0–0.5)
KETONES UR QL STRIP.AUTO: NEGATIVE MG/DL
LEUKOCYTE ESTERASE UR QL STRIP.AUTO: ABNORMAL
LYMPHOCYTES # BLD: 2.9 K/UL (ref 0.8–3.5)
LYMPHOCYTES NFR BLD: 40 % (ref 12–49)
MCH RBC QN AUTO: 27.9 PG (ref 26–34)
MCHC RBC AUTO-ENTMCNC: 33.9 G/DL (ref 30–36.5)
MCV RBC AUTO: 82.5 FL (ref 80–99)
MONOCYTES # BLD: 0.5 K/UL (ref 0–1)
MONOCYTES NFR BLD: 7 % (ref 5–13)
NEUTS SEG # BLD: 3.7 K/UL (ref 1.8–8)
NEUTS SEG NFR BLD: 52 % (ref 32–75)
NITRITE UR QL STRIP.AUTO: NEGATIVE
NRBC # BLD: 0 K/UL (ref 0–0.01)
NRBC BLD-RTO: 0 PER 100 WBC
PH UR STRIP: 7.5 [PH] (ref 5–8)
PLATELET # BLD AUTO: 197 K/UL (ref 150–400)
PMV BLD AUTO: 10.4 FL (ref 8.9–12.9)
POTASSIUM SERPL-SCNC: 3.6 MMOL/L (ref 3.5–5.1)
PROT SERPL-MCNC: 8 G/DL (ref 6.4–8.2)
PROT UR STRIP-MCNC: NEGATIVE MG/DL
RBC # BLD AUTO: 4.51 M/UL (ref 3.8–5.2)
RBC #/AREA URNS HPF: ABNORMAL /HPF (ref 0–5)
SODIUM SERPL-SCNC: 140 MMOL/L (ref 136–145)
SP GR UR REFRACTOMETRY: 1.01 (ref 1–1.03)
UR CULT HOLD, URHOLD: NORMAL
UROBILINOGEN UR QL STRIP.AUTO: 0.2 EU/DL (ref 0.2–1)
WBC # BLD AUTO: 7.3 K/UL (ref 3.6–11)
WBC URNS QL MICRO: ABNORMAL /HPF (ref 0–4)

## 2021-07-06 PROCEDURE — 80053 COMPREHEN METABOLIC PANEL: CPT

## 2021-07-06 PROCEDURE — 81001 URINALYSIS AUTO W/SCOPE: CPT

## 2021-07-06 PROCEDURE — 74011250636 HC RX REV CODE- 250/636: Performed by: NURSE PRACTITIONER

## 2021-07-06 PROCEDURE — 76830 TRANSVAGINAL US NON-OB: CPT

## 2021-07-06 PROCEDURE — 85025 COMPLETE CBC W/AUTO DIFF WBC: CPT

## 2021-07-06 PROCEDURE — 36415 COLL VENOUS BLD VENIPUNCTURE: CPT

## 2021-07-06 PROCEDURE — 96360 HYDRATION IV INFUSION INIT: CPT

## 2021-07-06 RX ADMIN — SODIUM CHLORIDE 1000 ML: 9 INJECTION, SOLUTION INTRAVENOUS at 00:28

## 2021-07-06 NOTE — TELEPHONE ENCOUNTER
Lab itzel called to verify that abnormal pap has been received. Janie Graff has signed off and sent to his nurse to call patient per protocol.

## 2021-07-06 NOTE — ED TRIAGE NOTES
Pt arrives requiring wheelchair c/o runny nose, weakness, abdominal discomfort, and painful urination since today. Denies N/V. Pt states she is 2 months postpartum and has bleeding since then. Dr. Elena Garay is OB.

## 2021-07-06 NOTE — ED PROVIDER NOTES
HPI Rigoberto Barnes is a  27 y.o. female with Hx of gestational HTN, anemia, gastrtitis, postpartum depression who presents by hself to [facility] ED with cc of waekness, nasal congestion, vaginal bleeding, abdominal cramping. Pt reports that she had baby 2 mos ago w/ ongoing vaginal bleeding since. Denies passage of vaginal clots, has f/u w/ OBGYN post partum. Notes acute onset of generalized weakness, nasal congestion, fatigue approx 1h PTA in ED    IUD placed 2w ago w/ OBGYN, note states LMP 2021. (+) dysuria since today. No F/C, N/V/D, cough, vaginal discharge, congestion, CP, SOB, urinary frequency, flank pain. PCP: Jr Feldman MD    There are no other complaints, changes or physical findings at this time. Past Medical History:   Diagnosis Date    Abnormal Papanicolaou smear of cervix     ASCUS; will follow up after delivery    Anemia     on iron    Encounter for insertion of mirena IUD 2021    Gastritis     Gestational hypertension     patient stated that she has had high blood pressure in pregnancy previously    Postpartum depression     previously took medication; doesn't remember what medicine       No past surgical history on file. No family history on file. Social History     Socioeconomic History    Marital status:      Spouse name: Not on file    Number of children: Not on file    Years of education: Not on file    Highest education level: Not on file   Occupational History    Not on file   Tobacco Use    Smoking status: Never Smoker    Smokeless tobacco: Never Used   Substance and Sexual Activity    Alcohol use: Not Currently     Comment:  Occ    Drug use: Not Currently    Sexual activity: Yes     Partners: Male   Other Topics Concern     Service Not Asked    Blood Transfusions Not Asked    Caffeine Concern Not Asked    Occupational Exposure Not Asked    Hobby Hazards Not Asked    Sleep Concern Not Asked    Stress Concern Not Asked    Weight Concern Not Asked    Special Diet Not Asked    Back Care Not Asked    Exercise Not Asked    Bike Helmet Not Asked   [address] Not Asked    Self-Exams Not Asked   Social History Narrative    Not on file     Social Determinants of Health     Financial Resource Strain:     Difficulty of Paying Living Expenses:    Food Insecurity:     Worried About Running Out of Food in the Last Year:     Ran Out of Food in the Last Year:    Transportation Needs:     Lack of Transportation (Medical):  Lack of Transportation (Non-Medical):    Physical Activity:     Days of Exercise per Week:     Minutes of Exercise per Session:    Stress:     Feeling of Stress :    Social Connections:     Frequency of Communication with Friends and Family:     Frequency of Social Gatherings with Friends and Family:     Attends Restorationism Services:     Active Member of Clubs or Organizations:     Attends Club or Organization Meetings:     Marital Status:    Intimate Partner Violence:     Fear of Current or Ex-Partner:     Emotionally Abused:     Physically Abused:     Sexually Abused: ALLERGIES: Patient has no known allergies. Review of Systems   Constitutional: Negative for activity change, appetite change, chills and fever. HENT: Negative for congestion, rhinorrhea, sinus pressure, sneezing and sore throat. Eyes: Negative for pain, discharge and visual disturbance. Respiratory: Negative for cough and shortness of breath. Cardiovascular: Negative for chest pain. Gastrointestinal: Negative for abdominal pain, diarrhea, nausea and vomiting. Genitourinary: Negative for dysuria, flank pain, frequency and urgency. Musculoskeletal: Negative for arthralgias, back pain, gait problem, joint swelling, myalgias and neck pain. Skin: Negative for color change and rash. Neurological: Negative for dizziness, speech difficulty, weakness, light-headedness, numbness and headaches. Psychiatric/Behavioral: Negative for agitation, behavioral problems and confusion. All other systems reviewed and are negative. Vitals:    07/06/21 0004 07/06/21 0030 07/06/21 0200   BP: 123/81 119/77 123/81   Pulse: 96     Resp: 17     Temp: 98.1 °F (36.7 °C)     SpO2: 100% 100% 99%   Height: 5' (1.524 m)              Physical Exam  Vitals and nursing note reviewed. Constitutional:       General: She is not in acute distress. Appearance: She is well-developed. HENT:      Head: Normocephalic and atraumatic. Right Ear: External ear normal.      Left Ear: External ear normal.   Eyes:      Conjunctiva/sclera: Conjunctivae normal.      Pupils: Pupils are equal, round, and reactive to light. Cardiovascular:      Rate and Rhythm: Normal rate and regular rhythm. Heart sounds: Normal heart sounds. Pulmonary:      Effort: Pulmonary effort is normal.      Breath sounds: Normal breath sounds. Abdominal:      General: Bowel sounds are normal.      Palpations: Abdomen is soft. Tenderness: There is no abdominal tenderness. There is no guarding or rebound. Musculoskeletal:         General: Normal range of motion. Cervical back: Normal range of motion and neck supple. Skin:     General: Skin is warm and dry. Neurological:      Mental Status: She is alert and oriented to person, place, and time. Psychiatric:         Behavior: Behavior normal.         Thought Content:  Thought content normal.         Judgment: Judgment normal.          MDM  Number of Diagnoses or Management Options  Cyst of left ovary  Rhinorrhea  Vaginal bleeding  Weakness  Diagnosis management comments: DDx: dehydration, anemia, vaginal bleeding,retained POC, IUD malplacement,seasonal allergies, viral illness     H/H stable, US w/ L ovarian cyst and IUD in good placement, UA (-) UTI; nontender abd exam   Pt updated on diagnostic findings, states that she feels better/ resolution of s/sx   Reasons to return to ED provided/ reviewed, urged to f/u OBGYN ASAP        Amount and/or Complexity of Data Reviewed  Clinical lab tests: ordered and reviewed  Tests in the radiology section of CPT®: ordered and reviewed  Review and summarize past medical records: yes           Procedures    LABORATORY TESTS:  Recent Results (from the past 12 hour(s))   CBC WITH AUTOMATED DIFF    Collection Time: 07/06/21 12:34 AM   Result Value Ref Range    WBC 7.3 3.6 - 11.0 K/uL    RBC 4.51 3.80 - 5.20 M/uL    HGB 12.6 11.5 - 16.0 g/dL    HCT 37.2 35.0 - 47.0 %    MCV 82.5 80.0 - 99.0 FL    MCH 27.9 26.0 - 34.0 PG    MCHC 33.9 30.0 - 36.5 g/dL    RDW 18.4 (H) 11.5 - 14.5 %    PLATELET 706 661 - 185 K/uL    MPV 10.4 8.9 - 12.9 FL    NRBC 0.0 0  WBC    ABSOLUTE NRBC 0.00 0.00 - 0.01 K/uL    NEUTROPHILS 52 32 - 75 %    LYMPHOCYTES 40 12 - 49 %    MONOCYTES 7 5 - 13 %    EOSINOPHILS 1 0 - 7 %    BASOPHILS 0 0 - 1 %    IMMATURE GRANULOCYTES 0 0.0 - 0.5 %    ABS. NEUTROPHILS 3.7 1.8 - 8.0 K/UL    ABS. LYMPHOCYTES 2.9 0.8 - 3.5 K/UL    ABS. MONOCYTES 0.5 0.0 - 1.0 K/UL    ABS. EOSINOPHILS 0.1 0.0 - 0.4 K/UL    ABS. BASOPHILS 0.0 0.0 - 0.1 K/UL    ABS. IMM. GRANS. 0.0 0.00 - 0.04 K/UL    DF AUTOMATED     METABOLIC PANEL, COMPREHENSIVE    Collection Time: 07/06/21 12:34 AM   Result Value Ref Range    Sodium 140 136 - 145 mmol/L    Potassium 3.6 3.5 - 5.1 mmol/L    Chloride 108 97 - 108 mmol/L    CO2 24 21 - 32 mmol/L    Anion gap 8 5 - 15 mmol/L    Glucose 111 (H) 65 - 100 mg/dL    BUN 9 6 - 20 MG/DL    Creatinine 0.55 0.55 - 1.02 MG/DL    BUN/Creatinine ratio 16 12 - 20      GFR est AA >60 >60 ml/min/1.73m2    GFR est non-AA >60 >60 ml/min/1.73m2    Calcium 8.9 8.5 - 10.1 MG/DL    Bilirubin, total 0.4 0.2 - 1.0 MG/DL    ALT (SGPT) 85 (H) 12 - 78 U/L    AST (SGOT) 51 (H) 15 - 37 U/L    Alk.  phosphatase 134 (H) 45 - 117 U/L    Protein, total 8.0 6.4 - 8.2 g/dL    Albumin 4.5 3.5 - 5.0 g/dL    Globulin 3.5 2.0 - 4.0 g/dL    A-G Ratio 1.3 1.1 - 2.2 URINALYSIS W/MICROSCOPIC    Collection Time: 07/06/21 12:34 AM   Result Value Ref Range    Color YELLOW/STRAW      Appearance CLEAR CLEAR      Specific gravity 1.006 1.003 - 1.030      pH (UA) 7.5 5.0 - 8.0      Protein Negative NEG mg/dL    Glucose Negative NEG mg/dL    Ketone Negative NEG mg/dL    Bilirubin Negative NEG      Blood Negative NEG      Urobilinogen 0.2 0.2 - 1.0 EU/dL    Nitrites Negative NEG      Leukocyte Esterase TRACE (A) NEG      WBC 0-4 0 - 4 /hpf    RBC 0-5 0 - 5 /hpf    Epithelial cells FEW FEW /lpf    Bacteria Negative NEG /hpf    Hyaline cast 0-2 0 - 5 /lpf   URINE CULTURE HOLD SAMPLE    Collection Time: 07/06/21 12:34 AM    Specimen: Serum; Urine   Result Value Ref Range    Urine culture hold        Urine on hold in Microbiology dept for 2 days. If unpreserved urine is submitted, it cannot be used for addtional testing after 24 hours, recollection will be required. IMAGING RESULTS:  US TRANSVAGINAL   Final Result      1. IUD present within the uterus. No intrauterine gestational sac. Endometrial   stripe, 14 mm. 2. Complex 2.1 cm cystic lesion left ovary. Follow-up in 6-10 weeks recommended. 3. Right ovary not visualized. MEDICATIONS GIVEN:  Medications   sodium chloride 0.9 % bolus infusion 1,000 mL (0 mL IntraVENous IV Completed 7/6/21 0156)       IMPRESSION:  1. Vaginal bleeding    2. Cyst of left ovary    3. Weakness    4. Rhinorrhea        PLAN:  1. Discharge Medication List as of 7/6/2021  1:58 AM        2. Follow-up Information     Follow up With Specialties Details Why Contact Info    Quincy Georges MD Obstetrics & Gynecology, Gynecology, Obstetrics Schedule an appointment as soon as possible for a visit   [address]  [address] [phone]      [address] [facility] Emergency Medicine Go to  As needed, If symptoms worsen [address]  [phone]        3.  Return to ED if worse

## 2021-07-07 NOTE — PROGRESS NOTES
Left message for patient to call back regarding lab results. Please discuss with patient when she calls back and schedule her for a colpo in a 30 minute spot.

## 2021-07-12 NOTE — PROGRESS NOTES
Per Dr. Pascual Session- called and spoke with patient. Informed her of lab results. Patient verbalized understanding and had no questions at this time. Patient is scheduled for 07/21 at 1:20PM for colpo. Patient states she was seen in the ER and told she has a cyst. Patient aware she can chat with Dr. Pascual Session about that at the time of her colpo.

## 2021-08-03 ENCOUNTER — OFFICE VISIT (OUTPATIENT)
Dept: OBGYN CLINIC | Age: 31
End: 2021-08-03

## 2021-08-03 VITALS — DIASTOLIC BLOOD PRESSURE: 74 MMHG | BODY MASS INDEX: 26.76 KG/M2 | WEIGHT: 137 LBS | SYSTOLIC BLOOD PRESSURE: 116 MMHG

## 2021-08-03 DIAGNOSIS — R87.613 HGSIL (HIGH GRADE SQUAMOUS INTRAEPITHELIAL LESION) ON PAP SMEAR OF CERVIX: Primary | ICD-10-CM

## 2021-08-03 NOTE — PROGRESS NOTES
Colposcopy Procedure Note    Chart reviewed for the following:  I have reviewed the History & Physical and updated the Allergies for Michael Patrick. Indications: Michael Patrick is a 27 y.o.  female whose No LMP recorded. . She presents for colposcopy for evaluation of a cervical abnormality with a pap smear abnormality consisting of HSIL. After being presented with the risks, benefits and alternatives has she signed a consent for the procedure. She states that she understands the need for the procedure and has no further questions. She was informed that she may experience discomfort. She is aware of the small potential for complications, including post-colposcopic vaginal bleeding, vaginal bleeding, vaginal leukorrhea or cervisitis/endometritis. Procedure: She was positioned in the dorsal lithotomy position and a speculum was inserted into the vagina. The cervix was visualized with and without the colposcope. Dilute acetic acid was applied to the cervix. Colposcopy was performed. The transformation zone was completely visualized with manipulation. Biopsies were  taken from the cervix, placed in formalin, and sent to pathology. Endocervical curettage was performed. Monsels was applied to the cervix, hemostasis was noted. Findings: This colposcopy was satisfactory. Acetowhite changes were seen at 4 o'clock. Biopsies were sent from 4 o'clock. Post Procedure Status: The patient tolerated the procedure well with minimal discomfort. She was observed for 5 minutes and discharged in stable condition. She will be contacted with results/if further treatment is needed.     Signed By: Alicia Castorena     August 3, 2021

## 2021-08-03 NOTE — PATIENT INSTRUCTIONS
Colposcopia: Wilfred Us en el [facility]  Colposcopy: What to Expect at Home  Adair recuperación  Si le hicieron apurva biopsia, es posible que sienta algo de dolor en la vagina zoe un [address] Algo de sangrado o flujo vaginal es normal hasta apurva semana después de apurva biopsia. El flujo puede ser de color oscuro si le pusieron apurva solución en el erum uterino. Puede usar apurva toalla sanitaria para el sangrado. Podría tardar [place] a [place] para que tenga los [place] de la prueba. Esta hoja de [employer] idea general del tiempo que le llevará recuperarse. Sin embargo, cada persona se recupera a un ritmo diferente. Siga los pasos a continuación para mejorar con la mayor rapidez posible. ¿Cómo puede cuidarse en el [facility]? Actividad    · Puede volver al Dianne Gretchen y a la mayoría de alecia actividades diarias inmediatamente después de la prueba. Ejercicio    · No mandy ejercicio zoe 1 día después de la prueba. Medicamentos    · Adair médico le dirá si puede volver a adin alecia medicamentos y cuándo puede volver a hacerlo. También le dará indicaciones sobre cualquier medicamento nuevo que deba adin usted.     · Si shannon aspirina o cualquier otro medicamento que previene los coágulos de [place], pregúntele a adair médico si debería volver a tomarlo y en qué momento. Asegúrese de entender exactamente lo que adair médico quiere que mandy.     · [place] un analgésico (medicamento para el dolor) de venta aspen, shelia acetaminofén (Tylenol), ibuprofeno (Advil, Motrin) o naproxeno (Aleve). Sea ankit con los medicamentos. [place] y siga todas las instrucciones de la [place]. No tome dos o más analgésicos al mismo tiempo a menos que el médico se lo haya indicado. Muchos analgésicos contienen acetaminofén, es decir, Tylenol. El exceso de acetaminofén (Tylenol) puede ser dañino.    Otras instrucciones    · Use apurva toalla sanitaria si tiene algo de sangrado.     · No se mandy lavados vaginales, no tenga relaciones sexuales ni use tampones zoe 1 semana si le hicieron apurva biopsia. [place] dará tiempo para que sane el erum uterino.     · Puede bañarse o ducharse en cualquier momento después de la prueba. Esta hoja de [employer] idea general de cuánto tiempo tardará en recuperarse. Sin embargo, cada persona se recupera a un ritmo diferente. Siga los pasos siguientes para sentirse mejor servin pronto shelia sea posible. ¿Cuándo debe pedir ayuda? Llame a adair médico ahora mismo o busque atención médica inmediata si:    · Tiene sangrado vaginal intenso. [place] significa que empapa las toallas sanitarias o los tampones que suele usar cada hora, zoe 2 o más horas.     · El dolor no mejora después de adin analgésicos.     · Tiene señales de infección, tales shelia:  ? Mayor Lisbeth Clark. ? Flujo vaginal con un olor desagradable. ? Fiebre. Preste especial atención a cualquier cambio en adair honorio y asegúrese de ponerse en contacto con adair médico si:    · Tiene preguntas o inquietudes. ¿Dónde puede encontrar más información en inglés? Vaya a http://www.holder.com/  Andrea Few [id number] en la búsqueda para aprender más acerca de \"Colposcopia: Qué esperar en el hogar. \"  Revisado: 17 diciembre, 2020               Versión del contenido: 12.8  © 2305-7798 Healthwise, Incorporated. Las instrucciones de cuidado fueron adaptadas bajo licencia por [employer] (which disclaims liability or warranty for this information). Si usted tiene [place] afección médica o sobre estas instrucciones, siempre pregunte a adair profesional de honorio. Healthwise, Incorporated niega toda garantía o responsabilidad por adair uso de esta información.

## 2021-08-05 LAB
CPT CODES, 490044: NORMAL
CPT DISCLAIMER: NORMAL
CYTOLOGY SPEC DOC CYTO: NORMAL
DIAGNOSIS SYNOPSIS:: NORMAL
DX ICD CODE: NORMAL
PATH REPORT.GROSS SPEC: NORMAL
PATHOLOGIST NAME: NORMAL
SPECIMEN SOURCE: NORMAL

## 2021-08-06 NOTE — PROGRESS NOTES
Per Dr. Jenn Schwartz- called and spoke with patient. Informed her of lab results. Patient verbalized understanding and had no questions at this time.

## 2021-08-25 ENCOUNTER — OFFICE VISIT (OUTPATIENT)
Dept: OBGYN CLINIC | Age: 31
End: 2021-08-25
Payer: MEDICAID

## 2021-08-25 VITALS — DIASTOLIC BLOOD PRESSURE: 82 MMHG | SYSTOLIC BLOOD PRESSURE: 122 MMHG | BODY MASS INDEX: 26.56 KG/M2 | WEIGHT: 136 LBS

## 2021-08-25 DIAGNOSIS — Z30.431 IUD CHECK UP: Primary | ICD-10-CM

## 2021-08-25 PROCEDURE — 99212 OFFICE O/P EST SF 10 MIN: CPT | Performed by: OBSTETRICS & GYNECOLOGY

## 2021-08-25 NOTE — PROGRESS NOTES
IUD Follow-up    Patient presents today following recent IUD placement. She is overall satisfied with IUD     We discussed findings of US today including correctly placed IUD. PRN follow-up. Will push out gardasil a month bc she is getting second covid vaccine Friday. Visit time 10min. More than 50% of my face-to-face time was spend on patient counseling.     Signed By: Morteza Peñaloza     August 25, 2021

## 2021-09-30 ENCOUNTER — OFFICE VISIT (OUTPATIENT)
Dept: OBGYN CLINIC | Age: 31
End: 2021-09-30
Payer: MEDICAID

## 2021-09-30 DIAGNOSIS — Z23 ENCOUNTER FOR IMMUNIZATION: Primary | ICD-10-CM

## 2021-09-30 PROCEDURE — 90471 IMMUNIZATION ADMIN: CPT | Performed by: OBSTETRICS & GYNECOLOGY

## 2021-09-30 PROCEDURE — 90651 9VHPV VACCINE 2/3 DOSE IM: CPT | Performed by: OBSTETRICS & GYNECOLOGY

## 2021-09-30 NOTE — PROGRESS NOTES
After obtaining consent, and per orders of Dr. Bob Justin, injection of Gardasil 2/3 given by Kandice Conde MA. Patient instructed to remain in clinic for 20 minutes afterwards, and to report any adverse reaction to me immediately. Patient encouraged to make appointment today for next injection, Gardasil 3/3, due in 4 months. Patient's name, , and injection type were verified with the patient today prior to giving the injection.       Gardasil  : [id number]  Site: Left Deltoid  Route: Intramuscular  Dose: 0.5mL  Lot#: [id number]  Exp date: 2023  RPS:[id number]

## 2021-11-08 ENCOUNTER — TELEPHONE (OUTPATIENT)
Dept: OBGYN CLINIC | Age: 31
End: 2021-11-08

## 2021-11-08 NOTE — TELEPHONE ENCOUNTER
Pt calling with IUD issues. Pt states she feels like she can feel it moving and it is irritating her and her  now.      ?US and OV

## 2021-11-08 NOTE — TELEPHONE ENCOUNTER
If she is just wanting it out she doesn't need an ultrasound. If she wants to check on placement she needs to be scheduled for one.

## 2021-11-18 ENCOUNTER — OFFICE VISIT (OUTPATIENT)
Dept: OBGYN CLINIC | Age: 31
End: 2021-11-18

## 2021-11-18 DIAGNOSIS — Z30.431 IUD CHECK UP: Primary | ICD-10-CM

## 2021-11-18 DIAGNOSIS — R30.0 DYSURIA: ICD-10-CM

## 2021-11-18 PROBLEM — Z34.80 SUPERVISION OF OTHER NORMAL PREGNANCY: Status: RESOLVED | Noted: 2020-12-03 | Resolved: 2021-11-18

## 2021-11-18 PROBLEM — Z3A.39 39 WEEKS GESTATION OF PREGNANCY: Status: RESOLVED | Noted: 2021-04-24 | Resolved: 2021-11-18

## 2021-11-18 PROBLEM — Z34.02 ENCOUNTER FOR SUPERVISION OF NORMAL FIRST PREGNANCY IN SECOND TRIMESTER: Status: RESOLVED | Noted: 2020-12-03 | Resolved: 2021-11-18

## 2021-11-18 PROBLEM — Z34.90 TERM PREGNANCY: Status: RESOLVED | Noted: 2021-04-22 | Resolved: 2021-11-18

## 2021-11-18 PROCEDURE — 99213 OFFICE O/P EST LOW 20 MIN: CPT | Performed by: OBSTETRICS & GYNECOLOGY

## 2021-11-18 RX ORDER — NITROFURANTOIN 25; 75 MG/1; MG/1
100 CAPSULE ORAL 2 TIMES DAILY
Qty: 14 CAPSULE | Refills: 0 | Status: SHIPPED | OUTPATIENT
Start: 2021-11-18 | End: 2021-11-25

## 2021-11-18 NOTE — PROGRESS NOTES
IUD followup note    This is a follow-up visit for Sydnee Salinas is a G5 [id number],  32 y.o. female / No LMP recorded. (Menstrual status: IUD). She had an Mirena IUD placed 6/30/21. Routine check 8/25 IUD was correctly placed. She describes having a light amount of blood-tinged discharge which has occurred off and on since insertion of the Mirena. She has mild \"pinching\" suprapubic pain. It is localized to the in midline, and does not  radiate. She states her partner is bothered by her IUD strings, and that she also experiences some discomfort during intercourse. Associated signs and symptoms: she denies dyspareunia, expulsion, heavy bleeding, increased pain, fever, and pelvic pain. She also complains of dysuria. US today:    TV/ULTRASOUND  THE UTERUS IS ANTEVERTED, NORMAL IN SIZE AND ECHOGENICITY. THE IUD APPEARS TO BE CORRECTLY PLACED IN THE FUNDAL PORTION OF THE ENDOMETRIAL LINING. RIGHT OVARY APPEARS WNL. LEFT OVARY APPEARS WNL. NO FREE FLUID IS SEEN IN THE CDS. Past Medical History:   Diagnosis Date    Abnormal Papanicolaou smear of cervix     ASCUS; will follow up after delivery    Anemia     on iron    Encounter for insertion of mirena IUD 06/30/2021    Gastritis     Gestational hypertension     patient stated that she has had high blood pressure in pregnancy previously    HGSIL on Pap smear of cervix     Postpartum depression     previously took medication; doesn't remember what medicine     No past surgical history on file. Social History     Occupational History    Not on file   Tobacco Use    Smoking status: Never Smoker    Smokeless tobacco: Never Used   Vaping Use    Vaping Use: Never used   Substance and Sexual Activity    Alcohol use: Not Currently     Comment: Occ    Drug use: Not Currently    Sexual activity: Yes     Partners: Male     Birth control/protection: I.U.D. No family history on file.     No Known Allergies  Prior to Admission medications    Medication Sig Start Date End Date Taking? Authorizing Provider   levonorgestrel (MIRENA IU) by IntraUTERine route. Provider, Historical   ibuprofen (MOTRIN) 800 mg tablet Take 1 Tab by mouth every eight (8) hours as needed for Pain. Patient not taking: Reported on 6/30/2021 4/23/21   Katey Cano MD   ferrous sulfate (IRON PO) Take  by mouth. Patient not taking: Reported on 6/30/2021    Provider, Historical   PNV Combo #19-Iron-Fol Ac-DHA 22-6-1-200 mg cap Take 1 Tab by mouth daily. Patient not taking: Reported on 6/30/2021 1/7/21   Katey Cano MD        Review of Systems: History obtained from the patient  Constitutional: negative for weight loss, fever, night sweats  Breast: negative for breast lumps, nipple discharge, galactorrhea  GI: negative for change in bowel habits, abdominal pain, black or bloody stools  : negative for frequency, dysuria, hematuria, vaginal discharge  MSK: negative for back pain, joint pain, muscle pain  Skin: negative for itching, rash, hives  Psych: negative for anxiety, depression, change in mood      Objective: There were no vitals taken for this visit.     Physical Exam:   PHYSICAL EXAMINATION    Constitutional  · Appearance: well-nourished, well developed, alert, in no acute distress    Gastrointestinal  · Abdominal Examination: abdomen non-tender to palpation, normal bowel sounds, no masses present  · Liver and spleen: no hepatomegaly present, spleen not palpable  · Hernias: no hernias identified    Genitourinary  · External Genitalia: normal appearance for age, no discharge present, no tenderness present, no inflammatory lesions present, no masses present, no atrophy present  · Vagina: normal vaginal vault without central or paravaginal defects, no discharge present, no inflammatory lesions present, no masses present  · Bladder: non-tender to palpation  · Urethra: appears normal  · Cervix: normal with IUD string visible and appropriate length but 3-4mm trimmed   · Uterus: normal size, shape and consistency  · Adnexa: no adnexal tenderness present, no adnexal masses present  · Perineum: perineum within normal limits, no evidence of trauma, no rashes or skin lesions present    Skin  · General Inspection: no rash, no lesions identified    Neurologic/Psychiatric  · Mental Status:  · Orientation: grossly oriented to person, place and time  · Mood and Affect: mood normal, affect appropriate    Assessment:  Doing well with IUD in appropriate position  IUD string trimmed      Plan:   RTO for AE as scheduled.   Instructions given to pt

## 2021-11-18 NOTE — PATIENT INSTRUCTIONS
Aprenda sobre métodos anticonceptivos: Dispositivo intrauterino (DIU)  Learning About Birth Control: Intrauterine Device (IUD)  ¿Qué es un dispositivo intrauterino (DIU)? El dispositivo intrauterino (DIU) se [place] para prevenir el embarazo. Es un dispositivo pequeño de plástico y con forma de T. Adair médico le coloca el DIU en el Mandi Jemal. Si usted y adair médico hablan de ello antes del parto, esto puede hacerse inmediatamente después de que dé a nura a adair bebé. Puede elegir entre un DIU hormonal y un DIU de cobre. El DIU hormonal puede prevenir el embarazo por entre 3 y 10 años, según cuál sea el DIU que se use. Sheryl adair médico puede hablar con usted acerca de dejarlo colocado por más tiempo. Sole vez que lo tiene, no necesita hacer nada más para prevenir el embarazo. El DIU de cobre puede prevenir el embarazo por 10 años. Sheryl adair médico puede hablar con usted acerca de dejarlo colocado más tiempo. Sole vez que lo tiene, no tiene que hacer nada más para prevenir [place]. Un cordón adherido al extremo del DIU cuelga a través de la abertura del útero (llamada erum uterino) dentro de la vagina. Puede revisar que el DIU está en adair lugar sintiendo el cordón. El DIU generalmente se queda en el útero hasta que adair médico lo retira. ¿Cuál es adair eficacia? En el primer año de uso:  · Cuando el DIU hormonal se utiliza exactamente shelia se indica, menos de 1 abhi de cada 100 tiene un embarazo no planificado. · Cuando el DIU de cobre se [place] exactamente shelia se indica, menos de 1 [place] de cada 100 tiene un embarazo no planificado. Asegúrese de informar a adair médico sobre cualquier problema de honorio o [employer] medicamentos que shannon. Taisha puede ayudarle a elegir el método anticonceptivo que sea adecuado para usted. ¿Cuáles son Lenette Masters un DIU? · El DIU es alysia de los métodos anticonceptivos más eficaces. · Previene el embarazo por entre 3 y [address], dependiendo del tipo de [place].  Usted no tiene que preocuparse de los métodos anticonceptivos zoe [employer]. · Es seguro para utilizarse mientras se está amamantando. · Los DIU no contienen estrógeno. Por lo que puede usar un DIU si no quiere o no puede adin estrógeno debido a que tiene inquietudes o ciertos problemas de honorio. · El DIU es práctico. Olivia la gurpreet de Jackelyn stacy. No necesita recordar tomarse apurva pastilla o aplicarse apurva inyección. No tiene que [employer] sexuales para protegerse del embarazo. · Un DIU hormonal puede reducir el sangrado abundante y los cólicos. ¿Cuáles son Zepeda Hose un DIU?  · Un DIU no protege contra las infecciones de transmisión sexual (STI, por alecia siglas en inglés), shelia el herpes o el VIH/SIDA. Si no está jaeger de si adair shasta sexual pudiera tener apurva STI, utilice un condón para protegerse de apurva enfermedad. · Un DIU de cobre puede provocar períodos con Olga Hosteller y cólicos. · Tiene que twin a un médico para que le coloque o le quite el DIU. ¿Dónde puede encontrar más información en inglés? Vaya a http://www.holder.com/  Ramona Joiner [id number] en la búsqueda para aprender más acerca de \"Aprenda sobre métodos anticonceptivos: Dispositivo intrauterino (DIU). \"  Revisado: 16 junio, 2021               Versión del contenido: 13.0  © 3340-0360 Healthwise, Incorporated. Las instrucciones de cuidado fueron adaptadas bajo licencia por [employer] (which disclaims liability or warranty for this information). Si usted tiene [place] afección médica o sobre estas instrucciones, siempre pregunte a adair profesional de honorio. Healthwise, Incorporated niega toda garantía o responsabilidad por adair uso de esta información.

## 2021-11-20 LAB
BACTERIA UR CULT: NORMAL
SPECIMEN STATUS REPORT, ROLRST: NORMAL

## 2021-11-24 NOTE — PROGRESS NOTES
Per Dr. Ramon Burnette- called and spoke with patient. Informed her of lab results. Patient verbalized understanding and had no questions at this time.

## 2021-11-24 NOTE — ED PROVIDER NOTES
Redness and pain to R eye. Started yesterday after accidentally squirting herself in the eye with OTC eye drops and developed redness and irritation. Increased tearing. Pt washed out with water but irritation has continued. No visual changes. Does not wear contacts. History reviewed. No pertinent past medical history. History reviewed. No pertinent surgical history. History reviewed. No pertinent family history. Social History     Socioeconomic History    Marital status: SINGLE     Spouse name: Not on file    Number of children: Not on file    Years of education: Not on file    Highest education level: Not on file   Occupational History    Not on file   Social Needs    Financial resource strain: Not on file    Food insecurity:     Worry: Not on file     Inability: Not on file    Transportation needs:     Medical: Not on file     Non-medical: Not on file   Tobacco Use    Smoking status: Never Smoker    Smokeless tobacco: Never Used   Substance and Sexual Activity    Alcohol use: No    Drug use: Not on file    Sexual activity: Not on file   Lifestyle    Physical activity:     Days per week: Not on file     Minutes per session: Not on file    Stress: Not on file   Relationships    Social connections:     Talks on phone: Not on file     Gets together: Not on file     Attends [employer] service: Not on file     Active member of club or organization: Not on file     Attends meetings of clubs or organizations: Not on file     Relationship status: Not on file    Intimate partner violence:     Fear of current or ex partner: Not on file     Emotionally abused: Not on file     Physically abused: Not on file     Forced sexual activity: Not on file   Other Topics Concern    Not on file   Social History Narrative    Not on file         ALLERGIES: Patient has no known allergies. Review of Systems   Constitutional: Negative for fever. HENT: Negative for facial swelling.     Eyes: Negative for visual disturbance. Respiratory: Negative for chest tightness. Cardiovascular: Negative for chest pain. Gastrointestinal: Negative for abdominal pain. Genitourinary: Negative for difficulty urinating and dysuria. Musculoskeletal: Negative for arthralgias. Skin: Negative for rash. Neurological: Negative for headaches. Hematological: Negative for adenopathy. Psychiatric/Behavioral: Negative for suicidal ideas. Vitals:    07/19/19 1359   Pulse: (!) 109   SpO2: 99%            Physical Exam   Constitutional: She is oriented to person, place, and time. She appears well-developed and well-nourished. No distress. HENT:   Head: Normocephalic and atraumatic. Mouth/Throat: Oropharynx is clear and moist.   Eyes: Pupils are equal, round, and reactive to light. EOM are normal.   Mildly injected sclera on R side. Neck: Normal range of motion. Neck supple. No thyromegaly present. Cardiovascular: Normal rate, regular rhythm, normal heart sounds and intact distal pulses. No murmur heard. Pulmonary/Chest: Effort normal and breath sounds normal. No respiratory distress. Abdominal: Soft. Bowel sounds are normal. She exhibits no distension. There is no tenderness. Musculoskeletal: Normal range of motion. She exhibits no edema. Neurological: She is alert and oriented to person, place, and time. Skin: Skin is warm and dry. No rash noted. She is not diaphoretic. Nursing note and vitals reviewed.        MDM  Number of Diagnoses or Management Options  Chemical conjunctivitis of right eye:          Procedures Home

## 2022-02-07 ENCOUNTER — OFFICE VISIT (OUTPATIENT)
Dept: OBGYN CLINIC | Age: 32
End: 2022-02-07
Payer: MEDICAID

## 2022-02-07 DIAGNOSIS — Z23 ENCOUNTER FOR IMMUNIZATION: Primary | ICD-10-CM

## 2022-02-07 PROCEDURE — 90651 9VHPV VACCINE 2/3 DOSE IM: CPT | Performed by: ADVANCED PRACTICE MIDWIFE

## 2022-02-07 PROCEDURE — 90471 IMMUNIZATION ADMIN: CPT | Performed by: ADVANCED PRACTICE MIDWIFE

## 2022-02-07 NOTE — PROGRESS NOTES
After obtaining consent, and per orders of Kevin Rodriguez CNM, injection of Gardasil 3/3 given by Shayla Rock MA. Patient instructed to remain in clinic for 20 minutes afterwards, and to report any adverse reaction to me immediately. Patient's name, , and injection type were verified with the patient today prior to giving the injection.       Gardasil  : [employer]  Site: Left Deltoid  Route: Intramuscular  Dose: 0.5mL  Lot#: [id number]  Exp date: 2023  NDC: 7630-5604-40

## 2022-03-24 NOTE — PROGRESS NOTES
IUD Removal    Colby Villegas is a ,  32 y.o. female / presenting for IUD removal.   She had an Mirena IUD placed 21. Routine check  IUD was correctly placed. Patient experiencing pain with intercourse. Would like to start depo provera. Past Medical History:   Diagnosis Date    Abnormal Papanicolaou smear of cervix     ASCUS; will follow up after delivery    Anemia     on iron    Encounter for insertion of mirena IUD 2021    Gastritis     Gestational hypertension     patient stated that she has had high blood pressure in pregnancy previously    HGSIL on Pap smear of cervix     Postpartum depression     previously took medication; doesn't remember what medicine     No past surgical history on file. Social History     Occupational History    Not on file   Tobacco Use    Smoking status: Never Smoker    Smokeless tobacco: Never Used   Vaping Use    Vaping Use: Never used   Substance and Sexual Activity    Alcohol use: Not Currently     Comment: Occ    Drug use: Not Currently    Sexual activity: Yes     Partners: Male     Birth control/protection: I.U.D. No family history on file. No Known Allergies  Prior to Admission medications    Medication Sig Start Date End Date Taking? Authorizing Provider   levonorgestrel (MIRENA IU) by IntraUTERine route. Provider, Historical   ibuprofen (MOTRIN) 800 mg tablet Take 1 Tab by mouth every eight (8) hours as needed for Pain. Patient not taking: Reported on 2021   Claudia Cruz MD   ferrous sulfate (IRON PO) Take  by mouth. Patient not taking: Reported on 2021    Provider, Historical   PNV Combo #19-Iron-Fol Ac-DHA 22-6-1-200 mg cap Take 1 Tab by mouth daily.   Patient not taking: Reported on 2021   Claudia Cruz MD        Review of Systems: History obtained from the patient  Constitutional: negative for weight loss, fever, night sweats  Breast: negative for breast lumps, nipple discharge, galactorrhea  GI: negative for change in bowel habits, abdominal pain, black or bloody stools  : negative for frequency, dysuria, hematuria, vaginal discharge  MSK: negative for back pain, joint pain, muscle pain  Skin: negative for itching, rash, hives  Psych: negative for anxiety, depression, change in mood      Objective:    Physical Exam:   PHYSICAL EXAMINATION  Visit Vitals  /66   Ht 5' (1.524 m)   Wt 148 lb (67.1 kg)   BMI 28.90 kg/m²     Constitutional  · Appearance: well-nourished, well developed, alert, in no acute distress    Gastrointestinal  · Abdominal Examination: abdomen non-tender to palpation, normal bowel sounds, no masses present  · Liver and spleen: no hepatomegaly present, spleen not palpable  · Hernias: no hernias identified    Genitourinary  · External Genitalia: normal appearance for age, no discharge present, no tenderness present, no inflammatory lesions present, no masses present, no atrophy present  · Vagina: normal vaginal vault without central or paravaginal defects, no discharge present, no inflammatory lesions present, no masses present  · Bladder: non-tender to palpation  · Urethra: appears normal  · Cervix: normal with IUD string visible and appropriate length. IUD strings grasped and IUD removed intact. · Uterus: normal size, shape and consistency  · Adnexa: no adnexal tenderness present, no adnexal masses present  · Perineum: perineum within normal limits, no evidence of trauma, no rashes or skin lesions present    Skin  · General Inspection: no rash, no lesions identified    Neurologic/Psychiatric  · Mental Status:  · Orientation: grossly oriented to person, place and time  · Mood and Affect: mood normal, affect appropriate    Assessment:   IUD removal due to dyspareunia. Wants to start depo provera. Coming back today to get injection. Plan: Return for AE in August or sooner as needed.      IUD REMOVAL  Indications for Removal:  Fernando Mariano Ngozi Wall is a ,  32 y.o. female / whose No LMP recorded. (Menstrual status: IUD). Minerva Enrique who presents today for IUD removal. Her current IUD was placedmonths. She has had problems with the IUD: pain with sex. She requests removal of the IUD because the IUD effectiveness has . The IUD removal procedure was discussed with the patient and she had no further questions. Procedure: The patient was placed in a dorsal lithotomy position and appropriately draped. On bimanual exam the uterus was anterior and normal in size with no tenderness present. A speculum exam was performed and the cervix was visualized. The cervix was prepped with zephiran solution. The IUD string was visualized. Using ring forceps , the string was grasped and the IUD removed intact. The IUD was shown to the patient.          Salazar Rodgers MD

## 2022-03-24 NOTE — PATIENT INSTRUCTIONS
IUD Removal: Care Instructions  Your Care Instructions     The intrauterine device (IUD) is a method of birth control. It is a small, plastic, T-shaped device that contains copper or hormones. It is placed in your uterus. You may have had your IUD removed because you want to become pregnant. Or maybe it caused pain, bleeding, or an infection. You may have chosen another method of birth control. If you don't want to get pregnant, make sure to use another form of birth control now that your IUD is not in place. Talk to your doctor about other forms of birth control. Follow-up care is a key part of your treatment and safety. Be sure to make and go to all appointments, and call your doctor if you are having problems. It's also a good idea to know your test results and keep a list of the medicines you take. How can you care for yourself at home? · IUD removal does not usually cause any pain or problems if the IUD is removed because you want to become pregnant or because of bleeding. · Once the IUD is taken out, you can become pregnant. If you want to become pregnant, you can start trying to have a baby as soon as you like. · If your doctor prescribed antibiotics because of an infection, take them as directed. Do not stop taking them just because you feel better. You need to take the full course of antibiotics. When should you call for help? Call your doctor now or seek immediate medical care if:    · You have pain in your belly or pelvis.     · You have severe vaginal bleeding. This means that you are soaking through your usual pads or tampons every hour for 2 or more hours.     · You have a fever.     · You have a vaginal discharge that smells bad. Watch closely for changes in your health, and be sure to contact your doctor if you have any problems. Where can you learn more?   Go to http://www.gray.com/  Enter M725 in the search box to learn more about \"IUD Removal: Care Instructions. \"  Current as of: June 16, 2021               Content Version: 13.2  © 8239-6354 [employer] Care instructions adapted under license by bettercodes.org (which disclaims liability or warranty for this information). If you have questions about a medical condition or this instruction, always ask your healthcare professional. [address] any warranty or liability for your use of this information. Aprenda sobre métodos anticonceptivos: La inyección anticonceptiva  Learning About Birth Control: The Shot  ¿Qué es la inyección anticonceptiva? La inyección anticonceptiva se [place] para prevenir el embarazo. Esta inyección se aplica en el brazo o en las nalgas (glúteos). Mediante la inyección se le da apurva dosis de la hormona progestina. La inyección anticonceptiva a menudo se conoce por adair nombre comercial, Depo-Provera. La progestina previene el [place] de las siguientes formas: Espesa el moco del erum uterino. [place] hace que sea difícil que los espermatozoides ingresen en el Veldon Solitario. También adelgaza el revestimiento del útero, lo que dificulta que un óvulo fertilizado se adhiera al Veldon Solitario. La progestina puede en ocasiones evitar que los ovarios liberen un óvulo cada mes (ovulación). La inyección caty protección anticonceptiva zoe 3 meses por cada aplicación. Después necesitará otra inyección. La inyección anticonceptiva puede causar pérdida de masa ósea. [address] mujeres pueden [facility] jaeger zoe hasta 2 años y luego pueden optar por cambiar a otro método anticonceptivo. Es posible que algunas mujeres puedan utilizar la inyección anticonceptiva por más de 2 [place] gl[place]. ¿Cuál es adair eficacia? En el primer año de uso:  · Cuando la inyección anticonceptiva se [place] exactamente shelia se indica, menos de 1 [place] de cada 100 tiene un embarazo no planificado.   · Cuando la inyección anticonceptiva no se utiliza exactamente shelia se indica, 6 mujeres de cada 100 tienen un embarazo no planificado. Asegúrese de informarle al médico si tiene algún problema de honorio o si shannon algún medicamento. Él puede ayudarla a elegir el método anticonceptivo que sea adecuado para usted. ¿Cuáles son las ventajas de la inyección anticonceptiva? · La inyección anticonceptiva es [place]ia de los métodos anticonceptivos más eficaces. · Es práctica. Solamente necesita que le administren la inyección apurva vez cada 3 meses para prevenir el embarazo. No tiene que [employer] sexuales para protegerse del embarazo. · Previene el embarazo zoe 3 meses por cada aplicación. Usted no tiene que preocuparse de los métodos anticonceptivos zoe Yosef. · Es jaeger para utilizarse mientras se está amamantando. · La inyección anticonceptiva puede reducir el sangrado abundante y los cólicos. · La inyección anticonceptiva no contiene estrógeno. Por lo tanto, puede usarla si no quiere o no puede adin estrógeno debido a que tiene ciertas inquietudes o problemas de [place]. ¿Cuáles son las desventajas de la inyección anticonceptiva? · La inyección anticonceptiva no protege contra las infecciones de transmisión sexual (STI, por alecia siglas en inglés), shelia el herpes o el VIH/SIDA. Si no está jaeger de si adair shasta sexual pudiera tener apurva STI, utilice un condón para protegerse de cualquier enfermedad. · La inyección puede provocar pérdida de masa ósea en algunas mujeres. Hable con adair médico acerca de los riesgos y los beneficios. · La inyección es necesaria cada 3 meses. Cualquier efecto secundario puede durar 3 meses o más tiempo. ? La inyección anticonceptiva puede causar períodos Sam Schw[employer], o podría tener manchas de [place] períodos. También puede dejar de tener un período. Algunas mujeres david shelia apurva ventaja el no tener un período. ?  La inyección anticonceptiva puede causar Keny Darek de ánimo, menos interés en el sexo o aumento de peso.  · Si desea [place], puede tardar hasta 18 meses después de que deje de administrarse la inyección. [place] se debe a que las hormonas de la inyección anticonceptiva tienen que salir de adair [place], y adair cuerpo tiene que readaptarse. ¿Dónde puede encontrar más información en [facility]? Vaya a http://www.holder.com/  Andrew [id number] en la búsqueda para aprender más acerca de \"Aprenda sobre métodos anticonceptivos: La inyección anticonceptiva. \"  Revisado: 16 junio, 2021               Versión del contenido: 13.2  © 4989-9622 [employer]wise[employer] Incorporated. Las instrucciones de cuidado fueron adaptadas bajo licencia por [facility] (which disclaims liability or warranty for this information). Si [facility] tiene [place] afección médica o sobre estas instrucciones, siempre pregunte a adair profesional de honorio. [place], "[employer] niega toda garantía o responsabilidad por adair uso de esta información.

## 2022-03-25 ENCOUNTER — OFFICE VISIT (OUTPATIENT)
Dept: OBGYN CLINIC | Age: 32
End: 2022-03-25
Payer: MEDICAID

## 2022-03-25 VITALS
DIASTOLIC BLOOD PRESSURE: 66 MMHG | WEIGHT: 148 LBS | SYSTOLIC BLOOD PRESSURE: 108 MMHG | BODY MASS INDEX: 29.06 KG/M2 | HEIGHT: 60 IN

## 2022-03-25 DIAGNOSIS — Z30.013 INITIATION OF DEPO PROVERA: ICD-10-CM

## 2022-03-25 DIAGNOSIS — Z30.432 ENCOUNTER FOR IUD REMOVAL: Primary | ICD-10-CM

## 2022-03-25 DIAGNOSIS — Z30.013 ENCOUNTER FOR INITIAL PRESCRIPTION OF INJECTABLE CONTRACEPTIVE: ICD-10-CM

## 2022-03-25 PROCEDURE — 58301 REMOVE INTRAUTERINE DEVICE: CPT | Performed by: OBSTETRICS & GYNECOLOGY

## 2022-03-25 RX ORDER — MEDROXYPROGESTERONE ACETATE 150 MG/ML
150 INJECTION, SUSPENSION INTRAMUSCULAR ONCE
Status: COMPLETED | OUTPATIENT
Start: 2022-03-25 | End: 2022-03-25

## 2022-03-25 RX ORDER — MEDROXYPROGESTERONE ACETATE 150 MG/ML
150 INJECTION, SUSPENSION INTRAMUSCULAR
Qty: 1 ML | Refills: 4 | Status: SHIPPED | OUTPATIENT
Start: 2022-03-25

## 2022-03-25 RX ADMIN — MEDROXYPROGESTERONE ACETATE 150 MG: 150 INJECTION, SUSPENSION INTRAMUSCULAR at 11:03

## 2022-08-21 ENCOUNTER — HOSPITAL ENCOUNTER (EMERGENCY)
Age: 32
Discharge: HOME | End: 2022-08-21
Attending: EMERGENCY MEDICINE
Payer: MEDICAID

## 2022-08-21 VITALS
DIASTOLIC BLOOD PRESSURE: 71 MMHG | RESPIRATION RATE: 18 BRPM | OXYGEN SATURATION: 98 % | HEART RATE: 88 BPM | SYSTOLIC BLOOD PRESSURE: 120 MMHG | TEMPERATURE: 98.1 F

## 2022-08-21 DIAGNOSIS — H00.014 HORDEOLUM EXTERNUM OF LEFT UPPER EYELID: Primary | ICD-10-CM

## 2022-08-21 LAB — HCG UR QL: NEGATIVE

## 2022-08-21 PROCEDURE — 81025 URINE PREGNANCY TEST: CPT

## 2022-08-21 PROCEDURE — 74011250637 HC RX REV CODE- 250/637: Performed by: NURSE PRACTITIONER

## 2022-08-21 PROCEDURE — 99283 EMERGENCY DEPT VISIT LOW MDM: CPT

## 2022-08-21 RX ORDER — IBUPROFEN 400 MG/1
800 TABLET ORAL
Status: COMPLETED | OUTPATIENT
Start: 2022-08-21 | End: 2022-08-21

## 2022-08-21 RX ORDER — CLINDAMYCIN HYDROCHLORIDE 150 MG/1
300 CAPSULE ORAL
Status: COMPLETED | OUTPATIENT
Start: 2022-08-21 | End: 2022-08-21

## 2022-08-21 RX ORDER — CLINDAMYCIN HYDROCHLORIDE 300 MG/1
300 CAPSULE ORAL 4 TIMES DAILY
Qty: 28 CAPSULE | Refills: 0 | Status: SHIPPED | OUTPATIENT
Start: 2022-08-21 | End: 2022-08-28

## 2022-08-21 RX ORDER — ERYTHROMYCIN 5 MG/G
OINTMENT OPHTHALMIC
Status: COMPLETED | OUTPATIENT
Start: 2022-08-21 | End: 2022-08-21

## 2022-08-21 RX ADMIN — CLINDAMYCIN HYDROCHLORIDE 300 MG: 150 CAPSULE ORAL at 20:39

## 2022-08-21 RX ADMIN — ERYTHROMYCIN: 5 OINTMENT OPHTHALMIC at 20:40

## 2022-08-21 RX ADMIN — IBUPROFEN 800 MG: 400 TABLET, FILM COATED ORAL at 20:39

## 2022-08-22 NOTE — ED NOTES
RN reviewed discharge instructions with the patient. The patient verbalized understanding. PT left ED ambulatory with discharge papers in hand.

## 2022-08-22 NOTE — ED PROVIDER NOTES
CANDACE Butler is a 28 y.o. female with Hx of anemia, gastritis, gestational HTN, post partum depression who presents ambulatory by herself to [facility] ED with cc of L upper eyelid pain. Patient reports worsening, atraumatic swelling to her left upper eyelid with associated erythema. She denies any discharge from her eye. She denies any visual changes. Patient does not wear contact lenses or corrective lenses. Denies any environmental exposures. Denies any headaches, dizziness,F/C, N/V/D, cough, congestion, CP, SOB,urinary or bowel habit changes. Denies tobacco, alcohol, substance abuse. PCP: Nicole Quintero MD    There are no other complaints, changes or physical findings at this time. Past Medical History:   Diagnosis Date    Abnormal Papanicolaou smear of cervix     ASCUS; will follow up after delivery    Anemia     on iron    Encounter for insertion of mirena IUD 06/30/2021    Gastritis     Gestational hypertension     patient stated that she has had high blood pressure in pregnancy previously    HGSIL on Pap smear of cervix     Postpartum depression     previously took medication; doesn't remember what medicine       No past surgical history on file. Family History:   Problem Relation Age of Onset    No Known Problems Mother     No Known Problems Father        Social History     Socioeconomic History    Marital status:      Spouse name: Not on file    Number of children: Not on file    Years of education: Not on file    Highest education level: Not on file   Occupational History    Not on file   Tobacco Use    Smoking status: Never    Smokeless tobacco: Never   Vaping Use    Vaping Use: Never used   Substance and Sexual Activity    Alcohol use: Not Currently     Comment: Occ    Drug use: Not Currently    Sexual activity: Yes     Partners: Male     Birth control/protection: I.U.D.    Other Topics Concern     Service Not Asked    Blood Transfusions Not Asked Caffeine Concern Not Asked    Occupational Exposure Not Asked    Hobby Hazards Not Asked    Sleep Concern Not Asked    Stress Concern Not Asked    Weight Concern Not Asked    Special Diet Not Asked    Back Care Not Asked    Exercise Not Asked    Bike Helmet Not Asked    Seat Belt Not Asked    Self-Exams Not Asked   Social History Narrative    Not on file     Social Determinants of Health     Financial Resource Strain: Not on file   Food Insecurity: Not on file   Transportation Needs: Not on file   Physical Activity: Not on file   Stress: Not on file   Social Connections: Not on file   Intimate Partner Violence: Not on file   Housing Stability: Not on file         ALLERGIES: Patient has no known allergies. Review of Systems   Constitutional:  Negative for activity change, appetite change, chills and fever. HENT:  Negative for congestion, rhinorrhea, sinus pressure and sore throat. Eyes:  Positive for pain. Negative for visual disturbance. Respiratory:  Negative for cough and shortness of breath. Cardiovascular:  Negative for chest pain. Gastrointestinal:  Negative for abdominal pain, diarrhea, nausea and vomiting. Genitourinary:  Negative for dysuria, flank pain, frequency and urgency. Musculoskeletal:  Negative for arthralgias, back pain, myalgias and neck pain. Skin:  Negative for color change and rash. Neurological:  Negative for dizziness, weakness, light-headedness and headaches. Psychiatric/Behavioral:  Negative for agitation, behavioral problems and confusion. All other systems reviewed and are negative. There were no vitals filed for this visit. Physical Exam  Vitals and nursing note reviewed. Constitutional:       General: She is not in acute distress. Appearance: She is well-developed. HENT:      Head: Normocephalic and atraumatic.       Right Ear: External ear normal.      Left Ear: External ear normal.   Eyes:      General:         Left eye: Hordeolum present. Extraocular Movements: Extraocular movements intact. Conjunctiva/sclera: Conjunctivae normal.      Left eye: Left conjunctiva is not injected. No chemosis, exudate or hemorrhage. Pupils: Pupils are equal, round, and reactive to light. Cardiovascular:      Rate and Rhythm: Normal rate and regular rhythm. Heart sounds: Normal heart sounds. Pulmonary:      Effort: Pulmonary effort is normal.      Breath sounds: Normal breath sounds. Musculoskeletal:         General: Normal range of motion. Cervical back: Normal range of motion and neck supple. Skin:     General: Skin is warm and dry. Neurological:      Mental Status: She is alert and oriented to person, place, and time. Psychiatric:         Behavior: Behavior normal.         Thought Content: Thought content normal.         Judgment: Judgment normal.        MDM  Number of Diagnoses or Management Options  Hordeolum externum of left upper eyelid  Diagnosis management comments: DDx: stye, conjunctivitis, cellulitis     Patient presents with left upper eyelid swelling without any scleral or inner eye involvement. Appears to be consistent with a stye, EOMs intact. She does not present with any other systemic infectious symptoms. Discussed management of symptoms, including need for follow-up with [address] [place] [address] should she have ongoing concerns. Reasons to return to the emergency department were provided and reviewed.        Amount and/or Complexity of Data Reviewed  Review and summarize past medical records: yes           Procedures      LABORATORY TESTS:  Recent Results (from the past 12 hour(s))   HCG URINE, QL. - POC    Collection Time: 08/21/22  8:16 PM   Result Value Ref Range    Pregnancy test,urine (POC) Negative NEG         IMAGING RESULTS:  No orders to display       MEDICATIONS GIVEN:  Medications   erythromycin (ILOTYCIN) 5 mg/gram (0.5 %) ophthalmic ointment ( Left Eye Given 8/21/22 2040)   ibuprofen (MOTRIN) tablet 800 mg (800 mg Oral Given 8/21/22 2039)   clindamycin (CLEOCIN) capsule 300 mg (300 mg Oral Given 8/21/22 2039)       IMPRESSION:  1. Hordeolum externum of left upper eyelid        PLAN:  1. Discharge Medication List as of 8/21/2022  9:07 PM        START taking these medications    Details   clindamycin (CLEOCIN) 300 mg capsule Take 1 Capsule by mouth four (4) times daily for 7 days. , Normal, Disp-28 Capsule, R-0           CONTINUE these medications which have NOT CHANGED    Details   medroxyPROGESTERone (DEPO-PROVERA) 150 mg/mL injection 1 mL by IntraMUSCular route every three (3) months., Normal, Disp-1 mL, R-4           2. Follow-up Information       Follow up With Specialties Details Why Contact Info    [address] [facility] Emergency Medicine Go to  As needed, If symptoms worsen Meeta Carrera [address] [facility] [place]  Schedule an appointment as soon as possible for a visit   [place] [address] [address] [facility] [address] [phone]          3. Return to ED if worse     Please note that this dictation was completed with "[employer] the computer voice recognition software. Quite often unanticipated grammatical, syntax, homophones, and other interpretive errors are inadvertently transcribed by the computer software. Please disregard these errors. Please excuse any errors that have escaped final proofreading.

## 2022-08-22 NOTE — DISCHARGE INSTRUCTIONS
Apply warm compresses to your eye to help relieve pain and inflammation. Alternate Tylenol with Motrin for pain. If you have no improvement of symptoms in the next 2 days, you can return to the ER or follow up with an eye specialist (Jorge L) . Take medications as directed. Apply 1 cm of ointment to your lower eye lid 3 times a day for the next 5 days. Return to the ER for any worsening or worrisome symptoms.

## 2022-12-06 ENCOUNTER — INITIAL PRENATAL (OUTPATIENT)
Dept: OBGYN CLINIC | Age: 32
End: 2022-12-06

## 2022-12-06 VITALS
SYSTOLIC BLOOD PRESSURE: 110 MMHG | BODY MASS INDEX: 28.86 KG/M2 | HEIGHT: 60 IN | WEIGHT: 147 LBS | DIASTOLIC BLOOD PRESSURE: 74 MMHG

## 2022-12-06 DIAGNOSIS — Z34.90 PREGNANCY, UNSPECIFIED GESTATIONAL AGE: ICD-10-CM

## 2022-12-06 RX ORDER — ONDANSETRON 4 MG/1
4 TABLET, ORALLY DISINTEGRATING ORAL
Qty: 30 TABLET | Refills: 4 | Status: SHIPPED | OUTPATIENT
Start: 2022-12-06

## 2022-12-06 NOTE — PROGRESS NOTES
Current pregnancy history:    Robert Husbands is a  28 y.o. female / 10/12/2022. She presents for the evaluation of amenorrhea and a positive pregnancy test. Having nausea. Desires medication. LMP history:  The date of her LMP is 10/12/2022 . Her last menstrual period was normal and her LMP is not certain. Ultrasound data:  She had an ultrasound performed today in the office which revealed a viable khan pregnancy with a gestational age of 5W 2D giving an [address] of 2023. TA ULTRASOUND PERFORMED  A SINGLE VIABLE 8W2D WITH KALPESH OF 2023 IUP IS SEEN WITH NORMAL CARDIAC RHYTHM. GESTATIONAL AGE BASED ON TODAYS ULTRASOUND. A NORMAL YOLK SAC IS SEEN. RIGHT OVARY APPEARS WITHIN NORMAL LIMITS. LEFT OVARY APPEARS WITHIN NORMAL LIMITS. NO FREE FLUID IS SEEN IN THE CDS. Pregnancy symptoms:  Since her LMP she has experienced nausea and vomiting. She denies dysuria, abnormal vaginal discharge, vaginal bleeding, contractions or cramping. She reports good fetal movement. Past pregnancy history:     x4   1) May 2012 - 8lbs [facility])- [place]   2016- 6lbs  (Susu)- [facility]   3)2018 6 lbs Arville [place]) -[facility]   4) 2021 6 lbs (Miranda)-[place]      _ _ _ _ _ _ _ _ _ _ _ _ _ _ _ _ _ _ _ _ _ _ _ _ _ _ _ _ _ _ _ _     Substance history: negative for alcohol, tobacco and street drugs. Positive for nothing. Exposure history: There is/are no indoor cat/s in the home. The patient was instructed to not change the cat litter. She admits close contact with children on a regular basis. She has had chicken pox or the vaccine in the past.   Patient denies issues with domestic violence. Genetic Screening/Teratology Counseling: (Includes patient, baby's father, or anyone in either family with:)  3.  Patient's age >/= 28 at EDC?--no  2.   Thalassemia ([facility] [place], [address], or  background): MCV<80?--no.     3.  Neural tube defect (meningomyelocele, spina bifida, anencephaly)?--no.   4.  Congenital heart defect?--no.  5.  Down syndrome?--no.   6.  Mir-Sachs ([employer], [employer] [place])?--no.   7.  Canavan's Disease?--no.   8.  Familial Dysautonomia?--no.   9.  Sickle cell disease or trait ()? --no   The patient has not been tested for sickle trait  10. Hemophilia or other blood disorders?--no. 11.  Muscular dystrophy?--no. 12.  Cystic fibrosis?--no. 13.  Geary's Chorea?--no. 14.  Mental retardation/autism (if yes was person tested for Fragile X)?--no. 15.  Other inherited genetic or chromosomal disorder?--no. 12.  Maternal metabolic disorder (DM, PKU, etc)?--no. 17.  Patient or FOB with a child with a birth defect not listed above?--no.  17a. Patient or FOB with a birth defect themselves?--no. 18.  Recurrent pregnancy loss, or stillbirth?--no. 19.  Any medications since LMP other than prenatal vitamins (include vitamins, supplements, OTC meds, drugs, alcohol)?--no. 20.  Any other genetic/environmental exposure to discuss?--no. Infection History:  1. Lives with someone with TB or TB exposed?--no.   2.  Patient or partner has history of genital herpes?--no.  3.  Rash or viral illness since LMP?--no.    4.  History of STD (GC, CT, HPV, syphilis, HIV)? --no   5. Other: OTHER? Past Medical History:   Diagnosis Date    Abnormal Papanicolaou smear of cervix     ASCUS; will follow up after delivery    Anemia     on iron    Encounter for insertion of mirena IUD 06/30/2021    Gastritis     Gestational hypertension     patient stated that she has had high blood pressure in pregnancy previously    HGSIL on Pap smear of cervix     Postpartum depression     previously took medication; doesn't remember what medicine     History reviewed. No pertinent surgical history.   Social History     Occupational History    Not on file   Tobacco Use    Smoking status: Never    Smokeless tobacco: Never   Vaping Use    Vaping Use: Never used   Substance and Sexual Activity    Alcohol use: Not Currently     Comment: Occ    Drug use: Not Currently    Sexual activity: Yes     Partners: Male     Birth control/protection: I.U.D. Family History   Problem Relation Age of Onset    No Known Problems Mother     No Known Problems Father      OB History    Para Term  AB Living   6 4 4   1 4   SAB IAB Ectopic Molar Multiple Live Births           0 4      # Outcome Date GA Lbr Esteban/2nd Weight Sex Delivery Anes PTL Lv   6 Current            5 Term 21 39w6d  6 lb 15.6 oz (3.165 kg) F Vag-Spont EPI N DODIE   4 Term 18 40w0d  6 lb 9 oz (2.977 kg) M Vag-Spont  N DODIE   3 Term 06/10/16 42w0d  7 lb 6 oz (3.345 kg) F Vag-Spont  N DODIE   2 Term 05/10/12 42w0d  8 lb 9 oz (3.884 kg) M Vag-Spont  N DODIE   1 AB              No Known Allergies  Prior to Admission medications    Medication Sig Start Date End Date Taking? Authorizing Provider   medroxyPROGESTERone (DEPO-PROVERA) 150 mg/mL injection 1 mL by IntraMUSCular route every three (3) months.   Patient not taking: Reported on 2022 3/25/22   Ivy Baez MD        Review of Systems: History obtained from the patient  Constitutional: negative for weight loss, fever, night sweats  HEENT: negative for hearing loss, earache, congestion, snoring, sore throat  CV: negative for chest pain, palpitations, edema  Resp: negative for cough, shortness of breath, wheezing  Breast: negative for breast lumps, nipple discharge, galactorrhea  GI: negative for change in bowel habits, abdominal pain, black or bloody stools  : negative for frequency, dysuria, hematuria, vaginal discharge  MSK: negative for back pain, joint pain, muscle pain  Skin: negative for itching, rash, hives  Neuro: negative for dizziness, headache, confusion, weakness  Psych: negative for anxiety, depression, change in mood  Heme/lymph: negative for bleeding, bruising, pallor    Objective:  Visit Vitals  /74   Ht 5' (1.524 m)   Wt 147 lb (66.7 kg)   BMI 28.71 kg/m²       Physical Exam:     Constitutional  Appearance: well-nourished, well developed, alert, in no acute distress    HENT  Head  Face: appears normal  Eyes: appear normal  Ears: normal  Mouth: normal  Lips: no lesions      Chest  Respiratory Effort: breathing unlabored     Cardiovascular  Heart: Auscultation: regular rate and rhythm without murmur      Gastrointestinal  Abdominal Examination: abdomen non-tender to palpation, normal bowel sounds, no masses present  Liver and spleen: no hepatomegaly present, spleen not palpable  Hernias: no hernias identified    Genitourinary  VULVA: normal appearing vulva with no masses, tenderness or lesions   VAGINA: normal appearing vagina with normal color and discharge, no lesions   CERVIX: normal appearing cervix without discharge or lesions  UTERUS: enlarged to 8 week's size, nontender  ADNEXA: normal adnexa in size, nontender and no masses    Skin  General Inspection: no rash, no lesions identified    Neurologic/Psychiatric  Mental Status:  Orientation: grossly oriented to person, place and time  Mood and Affect: mood normal, affect appropriate      Assessment and Plan:   Sparkle Stevens is a 28 y.o.  at [address] [place] [address]   We discussed the RBIA/SE/Pos comp to zofran       Intrauterine pregnancy with issues addressed in problem list.  We discussed genetic testing screening options for the pregnancy and offered NIPT and the patient desires. We discussed carrier screening as per ACOG guidelines for CF, SMA, DMD, and Fragile X syndrome and the patient declines carrier screening. Course of pregnancy discussed including visit schedule, routine U/S, glucola testing, etc.  Avoid alcoholic beverages and illicit/recreational drugs use. Take prenatal vitamins or folic acid daily. Hospital and practice style discussed with coverage system.   Discussed nutrition, toxoplasmosis precautions, sexual activity, exercise, need for influenza vaccine, environmental and work hazards, travel advice, screen for domestic violence, need for seat belts. Discussed seafood, unpasteurized dairy products, deli meat, artificial sweeteners, and caffeine. Discussed current prescription drug use. Given medication list.  Discussed the use of over the counter medications and chemicals. Route of delivery discussed, including risks, benefits  Handouts given to pt. Patient will return to office   Follow-up and Dispositions    Return in about 4 weeks (around 1/3/2023), or SIXTO and two weeks for IOB labs and genetic testing. Ebony Leblanc MD, Tian Haas

## 2022-12-06 NOTE — LETTER
12/6/2022 11:21 AM    Ms. Elizabeth Shine  [facility] [address]      Ms. Jocelyn Juarez is currently under my care at [facility] for her current pregnancy. Patient has a due date of 07/16/2023.          Sincerely,          Tamiko Moreno MD

## 2022-12-15 NOTE — PROGRESS NOTES
Called patient using language line. Pap smear results given and abnormality explained, patient verbalized understanding.

## 2022-12-21 ENCOUNTER — LAB ONLY (OUTPATIENT)
Dept: OBGYN CLINIC | Age: 32
End: 2022-12-21

## 2022-12-21 DIAGNOSIS — Z34.90 PREGNANCY, UNSPECIFIED GESTATIONAL AGE: Primary | ICD-10-CM

## 2022-12-21 LAB
HEP B, EXTERNAL RESULT: NEGATIVE
HEPATITIS C ANTIBODY, EXTERNAL RESULT: NEGATIVE
HIV, EXTERNAL RESULT: NONREACTIVE
RUBELLA TITER, EXTERNAL RESULT: NORMAL

## 2022-12-25 LAB
ABO GROUP BLD: NORMAL
BACTERIA UR CULT: NORMAL
BASOPHILS # BLD AUTO: 0 X10E3/UL (ref 0–0.2)
BASOPHILS NFR BLD AUTO: 0 %
BLD GP AB SCN SERPL QL: NEGATIVE
EOSINOPHIL # BLD AUTO: 0.1 X10E3/UL (ref 0–0.4)
EOSINOPHIL NFR BLD AUTO: 2 %
ERYTHROCYTE [DISTWIDTH] IN BLOOD BY AUTOMATED COUNT: 13 % (ref 11.7–15.4)
HBV SURFACE AG SERPL QL IA: NEGATIVE
HCT VFR BLD AUTO: 35.9 % (ref 34–46.6)
HCV AB S/CO SERPL IA: <0.1 S/CO RATIO (ref 0–0.9)
HGB A MFR BLD ELPH: 97.5 % (ref 96.4–98.8)
HGB A2 MFR BLD ELPH: 2.5 % (ref 1.8–3.2)
HGB BLD-MCNC: 12.4 G/DL (ref 11.1–15.9)
HGB F MFR BLD ELPH: 0 % (ref 0–2)
HGB FRACT BLD-IMP: NORMAL
HGB S MFR BLD ELPH: 0 %
HIV 1+2 AB+HIV1 P24 AG SERPL QL IA: NON REACTIVE
IMM GRANULOCYTES # BLD AUTO: 0 X10E3/UL (ref 0–0.1)
IMM GRANULOCYTES NFR BLD AUTO: 0 %
LYMPHOCYTES # BLD AUTO: 0.8 X10E3/UL (ref 0.7–3.1)
LYMPHOCYTES NFR BLD AUTO: 19 %
MCH RBC QN AUTO: 31 PG (ref 26.6–33)
MCHC RBC AUTO-ENTMCNC: 34.5 G/DL (ref 31.5–35.7)
MCV RBC AUTO: 90 FL (ref 79–97)
MONOCYTES # BLD AUTO: 0.4 X10E3/UL (ref 0.1–0.9)
MONOCYTES NFR BLD AUTO: 9 %
NEUTROPHILS # BLD AUTO: 3 X10E3/UL (ref 1.4–7)
NEUTROPHILS NFR BLD AUTO: 70 %
PLATELET # BLD AUTO: 165 X10E3/UL (ref 150–450)
RBC # BLD AUTO: 4 X10E6/UL (ref 3.77–5.28)
RH BLD: POSITIVE
RUBV IGG SERPL IA-ACNC: 1.23 INDEX
TREPONEMA PALLIDUM IGG+IGM AB [PRESENCE] IN SERUM OR PLASMA BY IMMUNOASSAY: NON REACTIVE
VZV IGG SER IA-ACNC: 452 INDEX
WBC # BLD AUTO: 4.4 X10E3/UL (ref 3.4–10.8)

## 2023-01-03 ENCOUNTER — ROUTINE PRENATAL (OUTPATIENT)
Dept: OBGYN CLINIC | Age: 33
End: 2023-01-03
Payer: MEDICAID

## 2023-01-03 VITALS — SYSTOLIC BLOOD PRESSURE: 102 MMHG | DIASTOLIC BLOOD PRESSURE: 66 MMHG | BODY MASS INDEX: 28.16 KG/M2 | WEIGHT: 144.2 LBS

## 2023-01-03 DIAGNOSIS — Z34.90 PREGNANCY, UNSPECIFIED GESTATIONAL AGE: Primary | ICD-10-CM

## 2023-01-03 PROCEDURE — 0502F SUBSEQUENT PRENATAL CARE: CPT | Performed by: OBSTETRICS & GYNECOLOGY

## 2023-01-03 RX ORDER — PROMETHAZINE HYDROCHLORIDE 12.5 MG/1
12.5 SUPPOSITORY RECTAL
Qty: 30 SUPPOSITORY | Refills: 2 | Status: SHIPPED | OUTPATIENT
Start: 2023-01-03

## 2023-01-03 NOTE — PROGRESS NOTES
Patient here for return OB visit. She reports no concerns. She denies vaginal bleeding, loss of fluid, abnormal vaginal discharge, UTI symptoms, cramping, or contractions. NIPT today. Visit Vitals  /66   Wt 144 lb 3.2 oz (65.4 kg)   BMI 28.16 kg/m²        Ebony Cobian MD

## 2023-01-31 ENCOUNTER — ROUTINE PRENATAL (OUTPATIENT)
Dept: OBGYN CLINIC | Age: 33
End: 2023-01-31
Payer: MEDICAID

## 2023-01-31 VITALS — BODY MASS INDEX: 29.53 KG/M2 | SYSTOLIC BLOOD PRESSURE: 116 MMHG | DIASTOLIC BLOOD PRESSURE: 70 MMHG | WEIGHT: 151.2 LBS

## 2023-01-31 DIAGNOSIS — Z34.90 PREGNANCY, UNSPECIFIED GESTATIONAL AGE: ICD-10-CM

## 2023-01-31 DIAGNOSIS — R30.0 BURNING WITH URINATION: Primary | ICD-10-CM

## 2023-01-31 LAB
BILIRUB UR QL STRIP: NEGATIVE
GLUCOSE UR-MCNC: NEGATIVE MG/DL
KETONES P FAST UR STRIP-MCNC: NEGATIVE MG/DL
PH UR STRIP: 7 [PH] (ref 4.6–8)
PROT UR QL STRIP: NORMAL
SP GR UR STRIP: 1.03 (ref 1–1.03)
UA UROBILINOGEN AMB POC: NORMAL (ref 0.2–1)
URINALYSIS CLARITY POC: NORMAL
URINALYSIS COLOR POC: YELLOW
URINE BLOOD POC: NORMAL
URINE LEUKOCYTES POC: NORMAL
URINE NITRITES POC: NEGATIVE

## 2023-01-31 PROCEDURE — 99212 OFFICE O/P EST SF 10 MIN: CPT | Performed by: OBSTETRICS & GYNECOLOGY

## 2023-01-31 PROCEDURE — 81002 URINALYSIS NONAUTO W/O SCOPE: CPT | Performed by: OBSTETRICS & GYNECOLOGY

## 2023-01-31 RX ORDER — NITROFURANTOIN 25; 75 MG/1; MG/1
100 CAPSULE ORAL 2 TIMES DAILY
Qty: 14 CAPSULE | Refills: 0 | Status: SHIPPED | OUTPATIENT
Start: 2023-01-31 | End: 2023-02-07

## 2023-01-31 NOTE — PROGRESS NOTES
Patient here for return OB visit. She reports she has been having burning with urination since night of 1/30/2023. No fevers or chills. Urine dip suspicious for UTI   We discussed the RBIA/SE/Pos comp to macrobid. [employer] [place] sent. She denies vaginal bleeding, loss of fluid, abnormal vaginal discharge, UTI symptoms, cramping, or contractions. Visit Vitals  /70   Wt 151 lb 3.2 oz (68.6 kg)   BMI 29.53 kg/m²        Ebony Chaidez MD

## 2023-02-02 LAB — BACTERIA UR CULT: NORMAL

## 2023-02-03 LAB
AFP INTERP SERPL-IMP: NORMAL
AFP INTERP SERPL-IMP: NORMAL
AFP MOM SERPL: NORMAL
AFP SERPL-MCNC: 22.9 NG/ML
AGE AT DELIVERY: 32.9 YR
COMMENT, 018013: NORMAL
GA METHOD: NORMAL
GA: NORMAL WEEKS
IDDM PATIENT QL: NORMAL
MULTIPLE PREGNANCY: NORMAL
NEURAL TUBE DEFECT RISK FETUS: NORMAL %
RESULTS, 017004: NORMAL

## 2023-03-03 ENCOUNTER — ROUTINE PRENATAL (OUTPATIENT)
Dept: OBGYN CLINIC | Age: 33
End: 2023-03-03

## 2023-03-03 VITALS
HEIGHT: 60 IN | SYSTOLIC BLOOD PRESSURE: 124 MMHG | WEIGHT: 156 LBS | BODY MASS INDEX: 30.63 KG/M2 | DIASTOLIC BLOOD PRESSURE: 78 MMHG

## 2023-03-03 DIAGNOSIS — Z34.82 PRENATAL CARE, SUBSEQUENT PREGNANCY IN SECOND TRIMESTER: Primary | ICD-10-CM

## 2023-03-03 DIAGNOSIS — Z34.90 PREGNANCY, UNSPECIFIED GESTATIONAL AGE: ICD-10-CM

## 2023-03-03 NOTE — PROGRESS NOTES
Per US today-FETAL SURVEY  A SINGLE VIABLE IUP AT 20W5D IS SEEN. FETAL CARDIAC MOTION OBSERVED. FETAL ANATOMY WAS WELL VISUALIZED AND APPEARS WNL. NO ABNORMALITIES WERE SEEN ON TODAYS EXAM.  APPROPRIATE GROWTH MEASURED. SIZE = DATES. LIZETH AND CERVIX APPEAR WITHIN NORMAL LIMITS. THE PLACENTA APPEARS TO BE MEASURING 1 CM FROM THE INTERNAL CERVICAL OS. GENDER: MALE    Patient feeling some movement. Patient notes some swelling in her vaginal area- request exam today. Normal exam today. All questions answered.    Follow up in 4 weeks   Glucose in 8 weeks   8 week repeat US

## 2023-03-05 ENCOUNTER — HOSPITAL ENCOUNTER (EMERGENCY)
Age: 33
Discharge: HOME | End: 2023-03-05
Attending: EMERGENCY MEDICINE
Payer: MEDICAID

## 2023-03-05 VITALS
DIASTOLIC BLOOD PRESSURE: 71 MMHG | TEMPERATURE: 97.8 F | HEART RATE: 106 BPM | SYSTOLIC BLOOD PRESSURE: 111 MMHG | RESPIRATION RATE: 18 BRPM | OXYGEN SATURATION: 97 %

## 2023-03-05 DIAGNOSIS — Z78.9 TELEPHONE LANGUAGE INTERPRETER SERVICE REQUIRED: ICD-10-CM

## 2023-03-05 DIAGNOSIS — J06.9 VIRAL UPPER RESPIRATORY TRACT INFECTION: ICD-10-CM

## 2023-03-05 DIAGNOSIS — H69.82 EUSTACHIAN TUBE DYSFUNCTION, LEFT: Primary | ICD-10-CM

## 2023-03-05 DIAGNOSIS — Z3A.20 20 WEEKS GESTATION OF PREGNANCY: ICD-10-CM

## 2023-03-05 PROCEDURE — 99283 EMERGENCY DEPT VISIT LOW MDM: CPT

## 2023-03-05 RX ORDER — GLYCERIN 0.25 %
1 DROPS OPHTHALMIC (EYE) AS NEEDED
Qty: 118 ML | Refills: 0 | Status: SHIPPED | OUTPATIENT
Start: 2023-03-05 | End: 2023-03-05

## 2023-03-05 RX ORDER — FLUTICASONE PROPIONATE 50 MCG
2 SPRAY, SUSPENSION (ML) NASAL DAILY
Qty: 16 G | Refills: 0 | Status: SHIPPED | OUTPATIENT
Start: 2023-03-05

## 2023-03-06 NOTE — DISCHARGE INSTRUCTIONS
You were seen in the emergency department today for ear pain related to eustachian tube dysfunction and a viral illness. See the attached information. I have sent you in a nasal spray and would like you to continue your Tylenol and begin sinus rinses. Please make sure you use distilled water. .  You should follow-up with your primary care provider in the next couple of days. You can take over the over-the-counter medications that we discussed for your symptoms. Return if you develop any difficulty breathing, chest pain, or any other new or concerning symptoms.

## 2023-03-06 NOTE — ED PROVIDER NOTES
#  Jennie Green [id number]    HPI   Patient is a 28 y.o. F who is notably 5 months pregnant who presents today with complaints of ear pain. She denies vaginal bleeding or any concern for the pregnancy. Patient states she has been feeling sick for the last 2 days. Endorsing pain to the left ear, with associated sore throat, fevers, chills. States the ear pain is sharp and radiates to the region of the zygomatic arch. She is eating and drinking without issues. Endorses having episodes of emesis 2 days ago, but no nausea, vomiting, diarrhea today. She has been taking Tylenol for her symptoms. There are no other complaints, changes or physical findings at this time. ALLERGIES: Patient has no known allergies. Past Medical History:   Diagnosis Date    Abnormal Papanicolaou smear of cervix     ASCUS; will follow up after delivery    Anemia     on iron    Encounter for insertion of mirena IUD 06/30/2021    Gastritis     Gestational hypertension     patient stated that she has had high blood pressure in pregnancy previously    HGSIL on Pap smear of cervix     Postpartum depression     previously took medication; doesn't remember what medicine     No past surgical history on file. Family History:   Problem Relation Age of Onset    No Known Problems Mother     No Known Problems Father      Social History     Socioeconomic History    Marital status:      Spouse name: Not on file    Number of children: Not on file    Years of education: Not on file    Highest education level: Not on file   Occupational History    Not on file   Tobacco Use    Smoking status: Never    Smokeless tobacco: Never   Vaping Use    Vaping Use: Never used   Substance and Sexual Activity    Alcohol use: Not Currently     Comment: Occ    Drug use: Not Currently    Sexual activity: Yes     Partners: Male     Birth control/protection: I.U.D.    Other Topics Concern     Service Not Asked    Blood Transfusions Not Asked Caffeine Concern Not Asked    Occupational Exposure Not Asked    Hobby Hazards Not Asked    Sleep Concern Not Asked    Stress Concern Not Asked    Weight Concern Not Asked    Special Diet Not Asked    Back Care Not Asked    Exercise Not Asked    Bike Helmet Not Asked    Seat Belt Not Asked    Self-Exams Not Asked   Social History Narrative    Not on file     Social Determinants of Health     Financial Resource Strain: Not on file   Food Insecurity: Not on file   Transportation Needs: Not on file   Physical Activity: Not on file   Stress: Not on file   Social Connections: Not on file   Intimate Partner Violence: Not on file   Housing Stability: Not on file           Review of Systems   Constitutional:  Negative for fever. HENT:  Negative for congestion. Eyes:  Negative for discharge. Respiratory:  Negative for shortness of breath. Cardiovascular:  Negative for chest pain. Gastrointestinal:  Negative for nausea. Genitourinary:  Negative for dysuria. Musculoskeletal:  Negative for myalgias. Neurological:  Negative for seizures. Psychiatric/Behavioral:  Negative for behavioral problems. Vitals:    03/05/23 2215   BP: 111/71   Pulse: (!) 106   Resp: 18   Temp: 97.8 °F (36.6 °C)   SpO2: 97%            Physical Exam  Constitutional:       General: She is not in acute distress. Appearance: Normal appearance. She is not ill-appearing, toxic-appearing or diaphoretic. HENT:      Head: Normocephalic and atraumatic. Right Ear: Tympanic membrane, ear canal and external ear normal.      Left Ear: Tympanic membrane, ear canal and external ear normal.      Ears:      Comments: No rash noted to external ears. Nose: Congestion present. Mouth/Throat:      Mouth: Mucous membranes are moist.      Pharynx: No oropharyngeal exudate or posterior oropharyngeal erythema. Eyes:      General:         Right eye: No discharge. Left eye: No discharge.       Pupils: Pupils are equal, round, and reactive to light. Cardiovascular:      Rate and Rhythm: Normal rate and regular rhythm. Heart sounds: Normal heart sounds. Pulmonary:      Effort: Pulmonary effort is normal.      Breath sounds: Normal breath sounds. Abdominal:      General: Abdomen is flat. Musculoskeletal:      Cervical back: Neck supple. Skin:     General: Skin is dry. Findings: No lesion or rash. Neurological:      General: No focal deficit present. Mental Status: She is alert. Mental status is at baseline. Psychiatric:         Mood and Affect: Mood normal.         Behavior: Behavior normal.           LABORATORY RESULTS:  No results found for this or any previous visit (from the past 24 hour(s)). IMAGING RESULTS:  No results found. MEDICATIONS GIVEN:  Medications - No data to display       Medical Decision Making  This patient presents in the 20th week of pregnancy with symptoms suspicious for likely viral upper respiratory infection with associated eustachian tube dysfunction. She denies vaginal bleeding or any concern for the pregnancy. No evidence of other atypical ear emergent conditions such as malignant otitis media/externa, Shashank Lozano syndrome. Based on history and physical doubt sinusitis. Do not suspect underlying cardiopulmonary process. I considered, but think unlikely, dangerous causes of this patient's symptoms to include ACS, CHF or COPD exacerbations, pneumonia, pneumothorax. Patient is nontoxic appearing and not in need of emergent medical intervention. Discharged home with Flonase, sinus rinses and Tylenol. Not a candidate for NSAIDs due to pregnancy. Presentation, management, and disposition were discussed with the attending physician, Dr. Neel Morales, who is in agreement with plan of care. Discussed results and work-up with patient and answered all questions, the patient expresses understanding and agrees with the care plan and disposition.   The patient was given an opportunity to ask questions and all concerns raised were addressed prior to discharge. Recommended patient follow-up with provider as listed below. Counseled patient on standard home and self-care measures. Specifically explained the emergent conditions that could arise and clearly instructed the patient to return to the emergency department for those and any other new, worsening, or concerning symptoms. Patient stable and ready for discharge. IMPRESSION:  1. Eustachian tube dysfunction, left    2. Viral upper respiratory tract infection    3. 20 weeks gestation of pregnancy    4. Telephone  service required        DISPOSITION:  Discharge    PLAN:  Follow-up Information       Follow up With Specialties Details Why Contact Info    Fannie Gusman MD Obstetrics & Gynecology, Gynecology, Obstetrics Schedule an appointment as soon as possible for a visit   [address] [place] [address]  [phone]      [address], [facility]  As needed, If symptoms worsen [address] [facility]  [phone]          Discharge Medication List as of 3/5/2023 11:23 PM        START taking these medications    Details   fluticasone propionate (FLONASE) 50 mcg/actuation nasal spray 2 Sprays by Both Nostrils route daily. Indications: chronic stuffy and runny nose not caused by allergies, Normal, Disp-16 g, R-0           CONTINUE these medications which have NOT CHANGED    Details   prenatal vit-iron fumarate-fa 27 mg iron- 0.8 mg tab tablet Take 1 Tablet by mouth daily. , Normal, Disp-30 Tablet, R-12             Please note that this dictation was completed with [employer], the [employer] voice recognition software. Quite often unanticipated grammatical, syntax, homophones, and other interpretive errors are inadvertently transcribed by the computer software. Please disregard these errors. Please excuse any errors that have escaped final proofreading.

## 2023-03-06 NOTE — ED TRIAGE NOTES
TRIAGE NOTE: Patient arrives ambulatory with c/o left ear pain, and left upper facial pain x 3 days. Patient reports she is 5 months pregnant and does not know what she can take for it.

## 2023-04-03 ENCOUNTER — TELEPHONE (OUTPATIENT)
Dept: OBGYN CLINIC | Age: 33
End: 2023-04-03

## 2023-04-03 NOTE — TELEPHONE ENCOUNTER
Patient calling name and  verified with help of Sami interpretor Lazaro Uriostegui . SS patient. Patient is  25 weeks 1 day gestation.  Patient states she was bit by bed bugs patient advised she can take benadryl and use topical cream , gave anaphylaxis precautions       Please advise further recommendations thank you

## 2023-04-04 NOTE — TELEPHONE ENCOUNTER
Called patient left message with interpretor assistance  ruddy [phone], per below in [address]     . Shyann Augustin MD  to Me    KP    4:07 PM  Agree with recommendations. If consistent with bed bugs, she does need to wash laundry on high heat and put in dryer otherwise she can spread infection.      Halima Barney MD

## 2023-04-06 ENCOUNTER — ROUTINE PRENATAL (OUTPATIENT)
Dept: OBGYN CLINIC | Age: 33
End: 2023-04-06
Payer: MEDICAID

## 2023-04-06 PROCEDURE — 0502F SUBSEQUENT PRENATAL CARE: CPT | Performed by: OBSTETRICS & GYNECOLOGY

## 2023-04-06 NOTE — PROGRESS NOTES
# [id number]  Patient is doing well overall  +FM  Pt reports having bed bugs and has sued Benadryl and cream which relieved the issue  Pt reports urine being pink in color but this has resolved.

## 2023-04-25 ENCOUNTER — ROUTINE PRENATAL (OUTPATIENT)
Dept: OBGYN CLINIC | Age: 33
End: 2023-04-25
Payer: MEDICAID

## 2023-04-25 VITALS — SYSTOLIC BLOOD PRESSURE: 116 MMHG | WEIGHT: 167 LBS | BODY MASS INDEX: 32.61 KG/M2 | DIASTOLIC BLOOD PRESSURE: 78 MMHG

## 2023-04-25 DIAGNOSIS — Z34.90 PREGNANCY, UNSPECIFIED GESTATIONAL AGE: ICD-10-CM

## 2023-04-25 DIAGNOSIS — Z34.82 ENCOUNTER FOR SUPERVISION OF OTHER NORMAL PREGNANCY IN SECOND TRIMESTER: Primary | ICD-10-CM

## 2023-04-25 DIAGNOSIS — Z13.1 SCREENING FOR DIABETES MELLITUS: ICD-10-CM

## 2023-04-25 PROCEDURE — 0502F SUBSEQUENT PRENATAL CARE: CPT | Performed by: OBSTETRICS & GYNECOLOGY

## 2023-04-25 NOTE — PROGRESS NOTES
RETURN OB VISIT:    Juhi Morel is 28w2d here for return OB visit at 28w2d. Aron Morocho to interpret today (#[id number]). She reports feeling well overall. No complaints/concerns for her visit. She reports good fetal movement. She denies vaginal bleeding, loss of fluid, abnormal vaginal discharge, UTI symptoms, cramping, or contractions. Visit Vitals  /78   Wt 167 lb (75.8 kg)   BMI 32.61 kg/m²        Patient Active Problem List    Diagnosis    Pregnancy     Primary Provider: JENIFFER    x4   x4   1) May 2012 - 8lbs [place] [facility])- [place]   2016- 6lbs  (Susu)- [place]   3) 2018 6 lbs Tiny Kasi) -[place]   4) 2021 6 lbs (Miranda)-[place]   EDC by ultrasound at 8 weeks   Social: homemaker  IOB labs: WNL. NIPT low risk Horizon neg for 4 out of 4 diseases  Genetic Screenin23 NIPT low risk Horizon neg for 4 out of 4 diseases. Anatomy:NL at 20w5d aside from LLP 1cm from os. GTT:23 ____ CBC____  TDAP:__  Rhogam: A positive   GBS:  Circ: Male-desires circ    Problem List:  Togolese speaking           S<D growth US ordered       Ebony Navarrete MD

## 2023-04-26 LAB
ERYTHROCYTE [DISTWIDTH] IN BLOOD BY AUTOMATED COUNT: 13.5 % (ref 11.7–15.4)
GLUCOSE 1H P 50 G GLC PO SERPL-MCNC: 127 MG/DL (ref 70–139)
HCT VFR BLD AUTO: 32.8 % (ref 34–46.6)
HGB BLD-MCNC: 10.6 G/DL (ref 11.1–15.9)
MCH RBC QN AUTO: 27.5 PG (ref 26.6–33)
MCHC RBC AUTO-ENTMCNC: 32.3 G/DL (ref 31.5–35.7)
MCV RBC AUTO: 85 FL (ref 79–97)
PLATELET # BLD AUTO: 189 X10E3/UL (ref 150–450)
RBC # BLD AUTO: 3.86 X10E6/UL (ref 3.77–5.28)
WBC # BLD AUTO: 7 X10E3/UL (ref 3.4–10.8)

## 2023-05-09 ENCOUNTER — ROUTINE PRENATAL (OUTPATIENT)
Age: 33
End: 2023-05-09

## 2023-05-09 VITALS — WEIGHT: 171 LBS | SYSTOLIC BLOOD PRESSURE: 122 MMHG | DIASTOLIC BLOOD PRESSURE: 74 MMHG | BODY MASS INDEX: 33.4 KG/M2

## 2023-05-09 DIAGNOSIS — Z34.80 ENCOUNTER FOR SUPERVISION OF OTHER NORMAL PREGNANCY, UNSPECIFIED TRIMESTER: Primary | ICD-10-CM

## 2023-05-09 DIAGNOSIS — Z34.90 PREGNANCY, UNSPECIFIED GESTATIONAL AGE: ICD-10-CM

## 2023-05-09 PROCEDURE — 0502F SUBSEQUENT PRENATAL CARE: CPT | Performed by: OBSTETRICS & GYNECOLOGY

## 2023-05-09 NOTE — PROGRESS NOTES
Patient here for return OB visit at 30w2d.  #[id number]. She reports . She reports good fetal movement. She denies vaginal bleeding, loss of fluid, abnormal vaginal discharge, UTI symptoms, cramping, or contractions. Discussed LLP 5 mm from os. Discussed recommendation for PLTCS. No hx of PTL/PTD. Will schedule for 37w1d. Discussed RBIA/SE/POS comp with patient. All questions answered. Growth Scan today (S<D) shows the following -   A SINGLE VERTEX 30W2D IUP IS SEEN. FETAL CARDIAC MOTION OBSERVED. LIMITED ANATOMY WAS VISUALIZED AND APPEARS WNL. EFW= 3LB 5OZ (45%)  DIANE= 9.4CM  TV EXAM PERFORMED TO BETTER VISUALIZE PLACENTA. PLACENTA APPEARS TO BE 5MM FROM THE  INTERNAL CERVICAL OS. CERVIX IS MEASURING 4.9. CLINICAL CORRELATION RECOMMENDED. /74   Wt 171 lb (77.6 kg)   LMP 10/12/2022 (Exact Date)   BMI 33.40 kg/m²           Patient Active Problem List    Diagnosis Date Noted    Pregnancy 2022     Primary Provider: Aaliyah Walker    x4   x4   1) May 2012 - 8lbs [place])- [place]   2016- 6lbs  (Ashia)- [facility]   3) 2018 6 lbs Elin Poplin) -[facility]   4) 2021 6 lbs (Maria Elena)-[place]   EDC by ultrasound at 8 weeks   Social: homemaker  IOB labs: WNL. NIPT low risk Horizon neg for 4 out of 4 diseases  Genetic Screenin23 NIPT low risk Horizon neg for 4 out of 4   diseases. Anatomy:NL at 20w5d aside from LLP 1cm from os. GTT:23, nml. CBC: h/h 10.6/32.8, plt 189  TDAP:__  Rhogam: A positive   GBS:  Circ: Male-desires circ    Problem List:  1) Croatian speaking   2) VERY LLP (5 mm from os)  -planning PLTCS at 37w1d           Return in about 2 weeks (around 2023) for Please schedule GRETTA at 32 wk, 34 and 36 weeks . Erica Vogt MD

## 2023-05-25 ENCOUNTER — ROUTINE PRENATAL (OUTPATIENT)
Age: 33
End: 2023-05-25

## 2023-05-25 VITALS — WEIGHT: 171 LBS | BODY MASS INDEX: 33.4 KG/M2 | SYSTOLIC BLOOD PRESSURE: 120 MMHG | DIASTOLIC BLOOD PRESSURE: 74 MMHG

## 2023-05-25 DIAGNOSIS — N89.8 VAGINAL DISCHARGE: Primary | ICD-10-CM

## 2023-05-25 NOTE — CONSULTS
Session ID: [id number]  Request ID: [id number]  Language: Indonesian  Status: Fulfilled   ID: #[id number]   Name: Santa Leung

## 2023-05-25 NOTE — PROGRESS NOTES
Problem OB: vaginal discharge. Patient here for return OB visit at [facility]. Interpretor #[id number]. She reports white/yellow vaginal discharge that onset two days ago. No associated vaginal irritation or odor. Repeat LTCS scheduled for 2023. She reports good fetal movement. She denies vaginal bleeding, loss of fluid, UTI symptoms, cramping, or contractions. /74   Wt 171 lb (77.6 kg)   LMP 10/12/2022 (Exact Date)   BMI 33.40 kg/m²             Patient Active Problem List    Diagnosis Date Noted    Pregnancy 2022     Primary Provider: Warner Herbert    x4   x4   1) May 2012 - 8lbs Beth Dawson)- [place]   2016- 6lbs  (Ashia)[facility]   3) 2018 6 lbs Elbejuarez Nixon) -[facility]   4) [facility]   EDC by ultrasound at 8 weeks   Social: homemaker  IOB labs: WNL. NIPT low risk Horizon neg for 4 out of 4 diseases  Genetic Screenin23 NIPT low risk Horizon neg for 4 out of 4   diseases. Anatomy:NL at 20w5d aside from LLP 1cm from os. GTT:23, nml. CBC: h/h 10.6/32.8, plt 189  TDAP:__  Rhogam: A positive   GBS:  Circ: Male-desires circ    Problem List:  1) Lithuanian speaking   2) VERY LLP (5 mm from os)  -planning PLTCS at [facility].  Gaye Salamanca MD

## 2023-05-28 LAB
A VAGINAE DNA VAG QL NAA+PROBE: NORMAL SCORE
BVAB2 DNA VAG QL NAA+PROBE: NORMAL SCORE
C ALBICANS DNA VAG QL NAA+PROBE: NEGATIVE
C GLABRATA DNA VAG QL NAA+PROBE: NEGATIVE
C TRACH DNA VAG QL NAA+PROBE: NEGATIVE
MEGA1 DNA VAG QL NAA+PROBE: NORMAL SCORE
N GONORRHOEA DNA VAG QL NAA+PROBE: NEGATIVE
T VAGINALIS DNA VAG QL NAA+PROBE: NEGATIVE

## 2023-06-06 ENCOUNTER — ROUTINE PRENATAL (OUTPATIENT)
Age: 33
End: 2023-06-06

## 2023-06-06 VITALS — BODY MASS INDEX: 33.98 KG/M2 | DIASTOLIC BLOOD PRESSURE: 74 MMHG | WEIGHT: 174 LBS | SYSTOLIC BLOOD PRESSURE: 118 MMHG

## 2023-06-06 DIAGNOSIS — Z34.83 PRENATAL CARE, SUBSEQUENT PREGNANCY IN THIRD TRIMESTER: Primary | ICD-10-CM

## 2023-06-06 DIAGNOSIS — Z34.90 PREGNANCY, UNSPECIFIED GESTATIONAL AGE: ICD-10-CM

## 2023-06-06 PROCEDURE — 0502F SUBSEQUENT PRENATAL CARE: CPT | Performed by: OBSTETRICS & GYNECOLOGY

## 2023-06-06 NOTE — CONSULTS
Session ID: [id number]  Request ID: [id number]  Language: Mongolian  Status: Fulfilled   ID: #[id number]   Name: Fredis Odom

## 2023-06-06 NOTE — PROGRESS NOTES
Subjective: Doing well. Good FM. Denies VB, LOF. /74   Wt 174 lb (78.9 kg)   LMP 10/12/2022 (Exact Date)   BMI 33.98 kg/m²     Prenatal Fetal Information  Fundal Height (cm): 34 cm  Fetal HR: 140s  Movement: Present    A/P  Denice Cavanaugh is a 28 y.o. [id number] at 34w2d with pregnancy complicated by:     Patient Active Problem List    Diagnosis Date Noted    Pregnancy 2022     Primary Provider: Martha Hunt    x4   x4   1) May 2012 - 8lbs Beula May)- [place]   2016- 6lbs  (Ashia)- [facility]   3) 2018 6 lbs Jennifer Vásquez) -[facility]   4) 2021 6 lbs (Maria Elena)-[facility]   EDC by ultrasound at 8 weeks   Social: homemaker  IOB labs: WNL. NIPT low risk Horizon neg for 4 out of 4 diseases  Genetic Screenin23 NIPT low risk Horizon neg for 4 out of 4   diseases. Anatomy:NL at 20w5d aside from LLP 1cm from os. GTT:23, nml.  CBC: h/h 10.6/32.8, plt 189  TDAP:__  Rhogam: A positive   GBS:  Circ: Male-desires circ    Problem List:  1) St Helen[place] speaking   2) VERY LLP (5 mm from os)  -planning PLTCS at [address] [place] [address], MD

## 2023-06-20 ENCOUNTER — ROUTINE PRENATAL (OUTPATIENT)
Age: 33
End: 2023-06-20

## 2023-06-20 VITALS — SYSTOLIC BLOOD PRESSURE: 114 MMHG | WEIGHT: 178 LBS | BODY MASS INDEX: 34.76 KG/M2 | DIASTOLIC BLOOD PRESSURE: 68 MMHG

## 2023-06-20 DIAGNOSIS — Z34.90 PREGNANCY, UNSPECIFIED GESTATIONAL AGE: ICD-10-CM

## 2023-06-20 DIAGNOSIS — Z34.80 ENCOUNTER FOR SUPERVISION OF OTHER NORMAL PREGNANCY, UNSPECIFIED TRIMESTER: ICD-10-CM

## 2023-06-20 DIAGNOSIS — Z36.85 ANTENATAL SCREENING FOR STREPTOCOCCUS B: Primary | ICD-10-CM

## 2023-06-20 LAB — GBS, EXTERNAL RESULT: NEGATIVE

## 2023-06-20 PROCEDURE — 0502F SUBSEQUENT PRENATAL CARE: CPT | Performed by: OBSTETRICS & GYNECOLOGY

## 2023-06-20 NOTE — CONSULTS
Session ID: [id number]  Request ID: [id number]  Language: Maori  Status: Fulfilled   ID: #[id number]   Name: Joe Butterfield

## 2023-06-20 NOTE — PROGRESS NOTES
Patient here for return OB visit at 36w2d. Interpretor #[id number]. She reports feeling well overall. Multiple questions about the CS was answered today. Scheduled RLTCS for 2023. She reports good fetal movement. She denies vaginal bleeding, loss of fluid, abnormal vaginal discharge, UTI symptoms, cramping, or contractions. /68   Wt 178 lb (80.7 kg)   LMP 10/12/2022 (Exact Date)   BMI 34.76 kg/m²             Patient Active Problem List    Diagnosis Date Noted    Pregnancy 2022     Primary Provider: Aaliyah Walker    x4   x4   1) May 2012 - 8lbs Ching Shape)- [place]   2016- 6lbs  (Ashia)- [facility]   3) 2018 6 lbs Elin Poplin) -[place]   4) 2021 6 lbs (Maria Elena)-[place]   EDC by ultrasound at 8 weeks   Social: homemaker  IOB labs: WNL. NIPT low risk Horizon neg for 4 out of 4 diseases  Genetic Screenin23 NIPT low risk Horizon neg for 4 out of 4   diseases. Anatomy:NL at 20w5d aside from LLP 1cm from os. GTT:23, nml. CBC: h/h 10.6/32.8, plt 189  TDAP 2023  Rhogam: A positive   GBS: collected 23 ____  Circ: Male-desires circ    Problem List:  1) Malian speaking   2) VERY LLP (5 mm from os)  -planning PLTCS at 37w1d           Follow up for PPV     Erica Vogt MD

## 2023-06-22 LAB — GP B STREP DNA SPEC QL NAA+PROBE: NEGATIVE

## 2023-06-23 ENCOUNTER — ANESTHESIA EVENT (OUTPATIENT)
Facility: HOSPITAL | Age: 33
DRG: 540 | End: 2023-06-23
Payer: MEDICAID

## 2023-06-26 ENCOUNTER — ANESTHESIA (OUTPATIENT)
Facility: HOSPITAL | Age: 33
DRG: 540 | End: 2023-06-26
Payer: MEDICAID

## 2023-06-26 ENCOUNTER — HOSPITAL ENCOUNTER (INPATIENT)
Facility: HOSPITAL | Age: 33
LOS: 3 days | Discharge: HOME | DRG: 540 | End: 2023-06-29
Attending: OBSTETRICS & GYNECOLOGY | Admitting: OBSTETRICS & GYNECOLOGY
Payer: MEDICAID

## 2023-06-26 DIAGNOSIS — Z98.891 S/P CESAREAN SECTION: Primary | ICD-10-CM

## 2023-06-26 PROBLEM — Z34.90 TERM PREGNANCY: Status: ACTIVE | Noted: 2023-06-26

## 2023-06-26 LAB
ABO + RH BLD: NORMAL
BLOOD GROUP ANTIBODIES SERPL: NORMAL
ERYTHROCYTE [DISTWIDTH] IN BLOOD BY AUTOMATED COUNT: 16.2 % (ref 11.5–14.5)
HCT VFR BLD AUTO: 35.7 % (ref 35–47)
HGB BLD-MCNC: 11 G/DL (ref 11.5–16)
MCH RBC QN AUTO: 25.3 PG (ref 26–34)
MCHC RBC AUTO-ENTMCNC: 30.8 G/DL (ref 30–36.5)
MCV RBC AUTO: 82.3 FL (ref 80–99)
NRBC # BLD: 0 K/UL (ref 0–0.01)
NRBC BLD-RTO: 0 PER 100 WBC
PLATELET # BLD AUTO: 180 K/UL (ref 150–400)
PMV BLD AUTO: 10.3 FL (ref 8.9–12.9)
RBC # BLD AUTO: 4.34 M/UL (ref 3.8–5.2)
SPECIMEN EXP DATE BLD: NORMAL
WBC # BLD AUTO: 6.9 K/UL (ref 3.6–11)

## 2023-06-26 PROCEDURE — 6360000002 HC RX W HCPCS: Performed by: OBSTETRICS & GYNECOLOGY

## 2023-06-26 PROCEDURE — 6360000002 HC RX W HCPCS: Performed by: ANESTHESIOLOGY

## 2023-06-26 PROCEDURE — 3700000000 HC ANESTHESIA ATTENDED CARE: Performed by: OBSTETRICS & GYNECOLOGY

## 2023-06-26 PROCEDURE — 86901 BLOOD TYPING SEROLOGIC RH(D): CPT

## 2023-06-26 PROCEDURE — 4A1HXCZ MONITORING OF PRODUCTS OF CONCEPTION, CARDIAC RATE, EXTERNAL APPROACH: ICD-10-PCS | Performed by: OBSTETRICS & GYNECOLOGY

## 2023-06-26 PROCEDURE — 2580000003 HC RX 258: Performed by: NURSE ANESTHETIST, CERTIFIED REGISTERED

## 2023-06-26 PROCEDURE — 7100000001 HC PACU RECOVERY - ADDTL 15 MIN: Performed by: OBSTETRICS & GYNECOLOGY

## 2023-06-26 PROCEDURE — 2580000003 HC RX 258: Performed by: OBSTETRICS & GYNECOLOGY

## 2023-06-26 PROCEDURE — 6360000002 HC RX W HCPCS: Performed by: NURSE ANESTHETIST, CERTIFIED REGISTERED

## 2023-06-26 PROCEDURE — 3700000001 HC ADD 15 MINUTES (ANESTHESIA): Performed by: OBSTETRICS & GYNECOLOGY

## 2023-06-26 PROCEDURE — 2720000010 HC SURG SUPPLY STERILE: Performed by: OBSTETRICS & GYNECOLOGY

## 2023-06-26 PROCEDURE — 59510 CESAREAN DELIVERY: CPT | Performed by: OBSTETRICS & GYNECOLOGY

## 2023-06-26 PROCEDURE — 7100000000 HC PACU RECOVERY - FIRST 15 MIN: Performed by: OBSTETRICS & GYNECOLOGY

## 2023-06-26 PROCEDURE — 85027 COMPLETE CBC AUTOMATED: CPT

## 2023-06-26 PROCEDURE — 6370000000 HC RX 637 (ALT 250 FOR IP): Performed by: OBSTETRICS & GYNECOLOGY

## 2023-06-26 PROCEDURE — 3609079900 HC CESAREAN SECTION: Performed by: OBSTETRICS & GYNECOLOGY

## 2023-06-26 PROCEDURE — 2709999900 HC NON-CHARGEABLE SUPPLY: Performed by: OBSTETRICS & GYNECOLOGY

## 2023-06-26 PROCEDURE — 1120000000 HC RM PRIVATE OB

## 2023-06-26 PROCEDURE — 86900 BLOOD TYPING SEROLOGIC ABO: CPT

## 2023-06-26 PROCEDURE — 86850 RBC ANTIBODY SCREEN: CPT

## 2023-06-26 PROCEDURE — 36415 COLL VENOUS BLD VENIPUNCTURE: CPT

## 2023-06-26 RX ORDER — TRISODIUM CITRATE DIHYDRATE AND CITRIC ACID MONOHYDRATE 500; 334 MG/5ML; MG/5ML
30 SOLUTION ORAL ONCE
Status: DISCONTINUED | OUTPATIENT
Start: 2023-06-26 | End: 2023-06-27 | Stop reason: SDUPTHER

## 2023-06-26 RX ORDER — KETOROLAC TROMETHAMINE 30 MG/ML
30 INJECTION, SOLUTION INTRAMUSCULAR; INTRAVENOUS EVERY 6 HOURS
Status: DISCONTINUED | OUTPATIENT
Start: 2023-06-26 | End: 2023-06-26 | Stop reason: SDUPTHER

## 2023-06-26 RX ORDER — ONDANSETRON 2 MG/ML
4 INJECTION INTRAMUSCULAR; INTRAVENOUS EVERY 6 HOURS PRN
Status: ACTIVE | OUTPATIENT
Start: 2023-06-26 | End: 2023-06-27

## 2023-06-26 RX ORDER — SODIUM CHLORIDE 0.9 % (FLUSH) 0.9 %
10 SYRINGE (ML) INJECTION EVERY 12 HOURS SCHEDULED
Status: DISCONTINUED | OUTPATIENT
Start: 2023-06-26 | End: 2023-06-27 | Stop reason: SDUPTHER

## 2023-06-26 RX ORDER — KETOROLAC TROMETHAMINE 30 MG/ML
30 INJECTION, SOLUTION INTRAMUSCULAR; INTRAVENOUS EVERY 6 HOURS
Status: COMPLETED | OUTPATIENT
Start: 2023-06-26 | End: 2023-06-27

## 2023-06-26 RX ORDER — SODIUM CHLORIDE, SODIUM LACTATE, POTASSIUM CHLORIDE, CALCIUM CHLORIDE 600; 310; 30; 20 MG/100ML; MG/100ML; MG/100ML; MG/100ML
INJECTION, SOLUTION INTRAVENOUS CONTINUOUS
Status: DISCONTINUED | OUTPATIENT
Start: 2023-06-26 | End: 2023-06-29 | Stop reason: HOSPADM

## 2023-06-26 RX ORDER — DEXAMETHASONE SODIUM PHOSPHATE 4 MG/ML
INJECTION, SOLUTION INTRA-ARTICULAR; INTRALESIONAL; INTRAMUSCULAR; INTRAVENOUS; SOFT TISSUE PRN
Status: DISCONTINUED | OUTPATIENT
Start: 2023-06-26 | End: 2023-06-26 | Stop reason: SDUPTHER

## 2023-06-26 RX ORDER — ACETAMINOPHEN 325 MG/1
650 TABLET ORAL EVERY 6 HOURS
Status: DISPENSED | OUTPATIENT
Start: 2023-06-26 | End: 2023-06-27

## 2023-06-26 RX ORDER — SODIUM CHLORIDE 9 MG/ML
INJECTION, SOLUTION INTRAVENOUS PRN
Status: DISCONTINUED | OUTPATIENT
Start: 2023-06-26 | End: 2023-06-29 | Stop reason: HOSPADM

## 2023-06-26 RX ORDER — SODIUM CHLORIDE 0.9 % (FLUSH) 0.9 %
5-40 SYRINGE (ML) INJECTION EVERY 12 HOURS SCHEDULED
Status: DISCONTINUED | OUTPATIENT
Start: 2023-06-26 | End: 2023-06-29 | Stop reason: HOSPADM

## 2023-06-26 RX ORDER — SODIUM CHLORIDE, SODIUM LACTATE, POTASSIUM CHLORIDE, CALCIUM CHLORIDE 600; 310; 30; 20 MG/100ML; MG/100ML; MG/100ML; MG/100ML
INJECTION, SOLUTION INTRAVENOUS CONTINUOUS PRN
Status: DISCONTINUED | OUTPATIENT
Start: 2023-06-26 | End: 2023-06-26 | Stop reason: SDUPTHER

## 2023-06-26 RX ORDER — MODIFIED LANOLIN
OINTMENT (GRAM) TOPICAL
Status: DISCONTINUED | OUTPATIENT
Start: 2023-06-26 | End: 2023-06-29 | Stop reason: HOSPADM

## 2023-06-26 RX ORDER — MISOPROSTOL 200 UG/1
800 TABLET ORAL PRN
Status: COMPLETED | OUTPATIENT
Start: 2023-06-26 | End: 2023-06-26

## 2023-06-26 RX ORDER — MORPHINE SULFATE 0.5 MG/ML
INJECTION, SOLUTION EPIDURAL; INTRATHECAL; INTRAVENOUS
Status: COMPLETED | OUTPATIENT
Start: 2023-06-26 | End: 2023-06-26

## 2023-06-26 RX ORDER — SODIUM CHLORIDE, SODIUM LACTATE, POTASSIUM CHLORIDE, AND CALCIUM CHLORIDE .6; .31; .03; .02 G/100ML; G/100ML; G/100ML; G/100ML
1000 INJECTION, SOLUTION INTRAVENOUS ONCE
Status: COMPLETED | OUTPATIENT
Start: 2023-06-26 | End: 2023-06-26

## 2023-06-26 RX ORDER — SODIUM CHLORIDE, SODIUM LACTATE, POTASSIUM CHLORIDE, CALCIUM CHLORIDE 600; 310; 30; 20 MG/100ML; MG/100ML; MG/100ML; MG/100ML
INJECTION, SOLUTION INTRAVENOUS CONTINUOUS
Status: DISCONTINUED | OUTPATIENT
Start: 2023-06-26 | End: 2023-06-27 | Stop reason: SDUPTHER

## 2023-06-26 RX ORDER — HYDROMORPHONE HYDROCHLORIDE 1 MG/ML
0.25 INJECTION, SOLUTION INTRAMUSCULAR; INTRAVENOUS; SUBCUTANEOUS EVERY 6 HOURS PRN
Status: ACTIVE | OUTPATIENT
Start: 2023-06-26 | End: 2023-06-27

## 2023-06-26 RX ORDER — SODIUM CHLORIDE 0.9 % (FLUSH) 0.9 %
10 SYRINGE (ML) INJECTION PRN
Status: DISCONTINUED | OUTPATIENT
Start: 2023-06-26 | End: 2023-06-27 | Stop reason: SDUPTHER

## 2023-06-26 RX ORDER — OXYTOCIN 10 [USP'U]/ML
INJECTION, SOLUTION INTRAMUSCULAR; INTRAVENOUS PRN
Status: DISCONTINUED | OUTPATIENT
Start: 2023-06-26 | End: 2023-06-26 | Stop reason: HOSPADM

## 2023-06-26 RX ORDER — OXYCODONE HYDROCHLORIDE 5 MG/1
5 TABLET ORAL EVERY 4 HOURS PRN
Status: ACTIVE | OUTPATIENT
Start: 2023-06-26 | End: 2023-06-27

## 2023-06-26 RX ORDER — MORPHINE SULFATE 2 MG/ML
2 INJECTION, SOLUTION INTRAMUSCULAR; INTRAVENOUS
Status: DISCONTINUED | OUTPATIENT
Start: 2023-06-26 | End: 2023-06-29 | Stop reason: HOSPADM

## 2023-06-26 RX ORDER — NALOXONE HYDROCHLORIDE 0.4 MG/ML
INJECTION, SOLUTION INTRAMUSCULAR; INTRAVENOUS; SUBCUTANEOUS PRN
Status: ACTIVE | OUTPATIENT
Start: 2023-06-26 | End: 2023-06-27

## 2023-06-26 RX ORDER — OXYCODONE HYDROCHLORIDE 5 MG/1
5 TABLET ORAL EVERY 4 HOURS PRN
Status: DISCONTINUED | OUTPATIENT
Start: 2023-06-26 | End: 2023-06-29 | Stop reason: HOSPADM

## 2023-06-26 RX ORDER — ONDANSETRON 2 MG/ML
INJECTION INTRAMUSCULAR; INTRAVENOUS PRN
Status: DISCONTINUED | OUTPATIENT
Start: 2023-06-26 | End: 2023-06-26 | Stop reason: SDUPTHER

## 2023-06-26 RX ORDER — ACETAMINOPHEN 500 MG
1000 TABLET ORAL EVERY 8 HOURS SCHEDULED
Status: DISCONTINUED | OUTPATIENT
Start: 2023-06-26 | End: 2023-06-26 | Stop reason: SDUPTHER

## 2023-06-26 RX ORDER — FENTANYL CITRATE 50 UG/ML
INJECTION, SOLUTION INTRAMUSCULAR; INTRAVENOUS
Status: COMPLETED | OUTPATIENT
Start: 2023-06-26 | End: 2023-06-26

## 2023-06-26 RX ORDER — DOCUSATE SODIUM 100 MG/1
100 CAPSULE, LIQUID FILLED ORAL 2 TIMES DAILY
Status: DISCONTINUED | OUTPATIENT
Start: 2023-06-26 | End: 2023-06-29 | Stop reason: HOSPADM

## 2023-06-26 RX ORDER — DIPHENHYDRAMINE HYDROCHLORIDE 50 MG/ML
12.5 INJECTION INTRAMUSCULAR; INTRAVENOUS EVERY 4 HOURS PRN
Status: DISCONTINUED | OUTPATIENT
Start: 2023-06-26 | End: 2023-06-29 | Stop reason: HOSPADM

## 2023-06-26 RX ORDER — IBUPROFEN 400 MG/1
800 TABLET ORAL EVERY 8 HOURS SCHEDULED
Status: DISCONTINUED | OUTPATIENT
Start: 2023-06-27 | End: 2023-06-29 | Stop reason: HOSPADM

## 2023-06-26 RX ORDER — MORPHINE SULFATE 4 MG/ML
4 INJECTION, SOLUTION INTRAMUSCULAR; INTRAVENOUS
Status: DISCONTINUED | OUTPATIENT
Start: 2023-06-26 | End: 2023-06-29 | Stop reason: HOSPADM

## 2023-06-26 RX ORDER — OXYTOCIN/RINGER'S LACTATE 30/500 ML
PLASTIC BAG, INJECTION (ML) INTRAVENOUS CONTINUOUS PRN
Status: DISCONTINUED | OUTPATIENT
Start: 2023-06-26 | End: 2023-06-26 | Stop reason: SDUPTHER

## 2023-06-26 RX ORDER — CARBOPROST TROMETHAMINE 250 UG/ML
250 INJECTION, SOLUTION INTRAMUSCULAR ONCE
Status: DISCONTINUED | OUTPATIENT
Start: 2023-06-26 | End: 2023-06-27 | Stop reason: ALTCHOICE

## 2023-06-26 RX ORDER — SODIUM CHLORIDE 9 MG/ML
INJECTION, SOLUTION INTRAVENOUS PRN
Status: DISCONTINUED | OUTPATIENT
Start: 2023-06-26 | End: 2023-06-27 | Stop reason: SDUPTHER

## 2023-06-26 RX ORDER — METHYLERGONOVINE MALEATE 0.2 MG/ML
200 INJECTION INTRAVENOUS PRN
Status: DISCONTINUED | OUTPATIENT
Start: 2023-06-26 | End: 2023-06-29 | Stop reason: HOSPADM

## 2023-06-26 RX ORDER — SODIUM CHLORIDE 0.9 % (FLUSH) 0.9 %
5-40 SYRINGE (ML) INJECTION PRN
Status: DISCONTINUED | OUTPATIENT
Start: 2023-06-26 | End: 2023-06-29 | Stop reason: HOSPADM

## 2023-06-26 RX ORDER — KETOROLAC TROMETHAMINE 30 MG/ML
INJECTION, SOLUTION INTRAMUSCULAR; INTRAVENOUS PRN
Status: DISCONTINUED | OUTPATIENT
Start: 2023-06-26 | End: 2023-06-26 | Stop reason: SDUPTHER

## 2023-06-26 RX ADMIN — CEFAZOLIN 2000 MG: 1 INJECTION, POWDER, FOR SOLUTION INTRAMUSCULAR; INTRAVENOUS at 10:03

## 2023-06-26 RX ADMIN — FENTANYL CITRATE 85 MCG: 50 INJECTION, SOLUTION INTRAMUSCULAR; INTRAVENOUS at 11:01

## 2023-06-26 RX ADMIN — SODIUM CHLORIDE, POTASSIUM CHLORIDE, SODIUM LACTATE AND CALCIUM CHLORIDE: 600; 310; 30; 20 INJECTION, SOLUTION INTRAVENOUS at 23:44

## 2023-06-26 RX ADMIN — SODIUM CHLORIDE, POTASSIUM CHLORIDE, SODIUM LACTATE AND CALCIUM CHLORIDE: 600; 310; 30; 20 INJECTION, SOLUTION INTRAVENOUS at 10:23

## 2023-06-26 RX ADMIN — ONDANSETRON HYDROCHLORIDE 4 MG: 2 INJECTION, SOLUTION INTRAMUSCULAR; INTRAVENOUS at 10:20

## 2023-06-26 RX ADMIN — Medication 333 MILLI-UNITS/MIN: at 10:44

## 2023-06-26 RX ADMIN — KETOROLAC TROMETHAMINE 30 MG: 30 INJECTION, SOLUTION INTRAMUSCULAR; INTRAVENOUS at 17:29

## 2023-06-26 RX ADMIN — FENTANYL CITRATE 15 MCG: 50 INJECTION, SOLUTION INTRAMUSCULAR; INTRAVENOUS at 10:10

## 2023-06-26 RX ADMIN — SODIUM CHLORIDE, POTASSIUM CHLORIDE, SODIUM LACTATE AND CALCIUM CHLORIDE: 600; 310; 30; 20 INJECTION, SOLUTION INTRAVENOUS at 14:16

## 2023-06-26 RX ADMIN — DEXAMETHASONE SODIUM PHOSPHATE 4 MG: 4 INJECTION, SOLUTION INTRAMUSCULAR; INTRAVENOUS at 10:20

## 2023-06-26 RX ADMIN — PHENYLEPHRINE HYDROCHLORIDE 20 MCG/MIN: 10 INJECTION INTRAVENOUS at 10:18

## 2023-06-26 RX ADMIN — SODIUM CHLORIDE, POTASSIUM CHLORIDE, SODIUM LACTATE AND CALCIUM CHLORIDE: 600; 310; 30; 20 INJECTION, SOLUTION INTRAVENOUS at 09:39

## 2023-06-26 RX ADMIN — SODIUM CHLORIDE, PRESERVATIVE FREE 10 ML: 5 INJECTION INTRAVENOUS at 23:47

## 2023-06-26 RX ADMIN — SODIUM CHLORIDE, PRESERVATIVE FREE 10 ML: 5 INJECTION INTRAVENOUS at 09:15

## 2023-06-26 RX ADMIN — SODIUM CHLORIDE, POTASSIUM CHLORIDE, SODIUM LACTATE AND CALCIUM CHLORIDE 1000 ML: 600; 310; 30; 20 INJECTION, SOLUTION INTRAVENOUS at 09:16

## 2023-06-26 RX ADMIN — Medication 500 ML: at 11:21

## 2023-06-26 RX ADMIN — KETOROLAC TROMETHAMINE 30 MG: 30 INJECTION, SOLUTION INTRAMUSCULAR; INTRAVENOUS at 23:40

## 2023-06-26 RX ADMIN — MISOPROSTOL 800 MCG: 200 TABLET ORAL at 10:47

## 2023-06-26 RX ADMIN — MORPHINE SULFATE 0.2 MG: 0.5 INJECTION, SOLUTION EPIDURAL; INTRATHECAL; INTRAVENOUS at 10:15

## 2023-06-26 RX ADMIN — KETOROLAC TROMETHAMINE 30 MG: 30 INJECTION, SOLUTION INTRAMUSCULAR at 11:17

## 2023-06-26 ASSESSMENT — PAIN DESCRIPTION - DESCRIPTORS: DESCRIPTORS: SORE

## 2023-06-26 ASSESSMENT — PAIN DESCRIPTION - ORIENTATION: ORIENTATION: LOWER

## 2023-06-26 ASSESSMENT — PAIN DESCRIPTION - LOCATION: LOCATION: INCISION

## 2023-06-26 ASSESSMENT — PAIN SCALES - GENERAL: PAINLEVEL_OUTOF10: 7

## 2023-06-27 LAB
ERYTHROCYTE [DISTWIDTH] IN BLOOD BY AUTOMATED COUNT: 16.4 % (ref 11.5–14.5)
HCT VFR BLD AUTO: 29.6 % (ref 35–47)
HCT VFR BLD AUTO: 30 % (ref 35–47)
HGB BLD-MCNC: 9.2 G/DL (ref 11.5–16)
HGB BLD-MCNC: 9.2 G/DL (ref 11.5–16)
MCH RBC QN AUTO: 25.8 PG (ref 26–34)
MCHC RBC AUTO-ENTMCNC: 30.7 G/DL (ref 30–36.5)
MCV RBC AUTO: 84 FL (ref 80–99)
NRBC # BLD: 0.02 K/UL (ref 0–0.01)
NRBC BLD-RTO: 0.2 PER 100 WBC
PLATELET # BLD AUTO: 155 K/UL (ref 150–400)
PMV BLD AUTO: 11.1 FL (ref 8.9–12.9)
RBC # BLD AUTO: 3.57 M/UL (ref 3.8–5.2)
WBC # BLD AUTO: 10.1 K/UL (ref 3.6–11)

## 2023-06-27 PROCEDURE — 6370000000 HC RX 637 (ALT 250 FOR IP): Performed by: ANESTHESIOLOGY

## 2023-06-27 PROCEDURE — 2580000003 HC RX 258: Performed by: STUDENT IN AN ORGANIZED HEALTH CARE EDUCATION/TRAINING PROGRAM

## 2023-06-27 PROCEDURE — 85027 COMPLETE CBC AUTOMATED: CPT

## 2023-06-27 PROCEDURE — 85018 HEMOGLOBIN: CPT

## 2023-06-27 PROCEDURE — 1120000000 HC RM PRIVATE OB

## 2023-06-27 PROCEDURE — 6370000000 HC RX 637 (ALT 250 FOR IP): Performed by: OBSTETRICS & GYNECOLOGY

## 2023-06-27 PROCEDURE — 85014 HEMATOCRIT: CPT

## 2023-06-27 PROCEDURE — 2580000003 HC RX 258: Performed by: OBSTETRICS & GYNECOLOGY

## 2023-06-27 PROCEDURE — 36415 COLL VENOUS BLD VENIPUNCTURE: CPT

## 2023-06-27 PROCEDURE — 2580000003 HC RX 258

## 2023-06-27 PROCEDURE — 59510 CESAREAN DELIVERY: CPT | Performed by: OBSTETRICS & GYNECOLOGY

## 2023-06-27 PROCEDURE — 6360000002 HC RX W HCPCS: Performed by: ANESTHESIOLOGY

## 2023-06-27 RX ORDER — SODIUM CHLORIDE, SODIUM LACTATE, POTASSIUM CHLORIDE, AND CALCIUM CHLORIDE .6; .31; .03; .02 G/100ML; G/100ML; G/100ML; G/100ML
500 INJECTION, SOLUTION INTRAVENOUS ONCE
Status: COMPLETED | OUTPATIENT
Start: 2023-06-27 | End: 2023-06-27

## 2023-06-27 RX ORDER — ACETAMINOPHEN 325 MG/1
650 TABLET ORAL EVERY 6 HOURS
Status: COMPLETED | OUTPATIENT
Start: 2023-06-27 | End: 2023-06-28

## 2023-06-27 RX ORDER — SODIUM CHLORIDE, SODIUM LACTATE, POTASSIUM CHLORIDE, AND CALCIUM CHLORIDE .6; .31; .03; .02 G/100ML; G/100ML; G/100ML; G/100ML
1000 INJECTION, SOLUTION INTRAVENOUS ONCE
Status: COMPLETED | OUTPATIENT
Start: 2023-06-27 | End: 2023-06-27

## 2023-06-27 RX ADMIN — IBUPROFEN 800 MG: 400 TABLET, FILM COATED ORAL at 20:15

## 2023-06-27 RX ADMIN — DIPHENHYDRAMINE HYDROCHLORIDE 12.5 MG: 50 INJECTION, SOLUTION INTRAMUSCULAR; INTRAVENOUS at 19:07

## 2023-06-27 RX ADMIN — SODIUM CHLORIDE, POTASSIUM CHLORIDE, SODIUM LACTATE AND CALCIUM CHLORIDE 500 ML: 600; 310; 30; 20 INJECTION, SOLUTION INTRAVENOUS at 03:26

## 2023-06-27 RX ADMIN — OXYCODONE HYDROCHLORIDE 5 MG: 5 TABLET ORAL at 12:19

## 2023-06-27 RX ADMIN — OXYCODONE HYDROCHLORIDE 5 MG: 5 TABLET ORAL at 16:18

## 2023-06-27 RX ADMIN — DOCUSATE SODIUM 100 MG: 100 CAPSULE, LIQUID FILLED ORAL at 14:54

## 2023-06-27 RX ADMIN — OXYCODONE HYDROCHLORIDE 5 MG: 5 TABLET ORAL at 20:15

## 2023-06-27 RX ADMIN — ACETAMINOPHEN 650 MG: 325 TABLET ORAL at 08:38

## 2023-06-27 RX ADMIN — SODIUM CHLORIDE, PRESERVATIVE FREE 10 ML: 5 INJECTION INTRAVENOUS at 12:04

## 2023-06-27 RX ADMIN — ACETAMINOPHEN 650 MG: 325 TABLET ORAL at 16:27

## 2023-06-27 RX ADMIN — DOCUSATE SODIUM 100 MG: 100 CAPSULE, LIQUID FILLED ORAL at 20:15

## 2023-06-27 RX ADMIN — SODIUM CHLORIDE, POTASSIUM CHLORIDE, SODIUM LACTATE AND CALCIUM CHLORIDE 1000 ML: 600; 310; 30; 20 INJECTION, SOLUTION INTRAVENOUS at 19:03

## 2023-06-27 RX ADMIN — KETOROLAC TROMETHAMINE 30 MG: 30 INJECTION, SOLUTION INTRAMUSCULAR; INTRAVENOUS at 06:20

## 2023-06-27 RX ADMIN — KETOROLAC TROMETHAMINE 30 MG: 30 INJECTION, SOLUTION INTRAMUSCULAR; INTRAVENOUS at 12:04

## 2023-06-27 ASSESSMENT — PAIN DESCRIPTION - DESCRIPTORS
DESCRIPTORS: SORE
DESCRIPTORS: SORE
DESCRIPTORS: ACHING

## 2023-06-27 ASSESSMENT — PAIN DESCRIPTION - ORIENTATION
ORIENTATION: LOWER

## 2023-06-27 ASSESSMENT — PAIN SCALES - GENERAL
PAINLEVEL_OUTOF10: 7
PAINLEVEL_OUTOF10: 6
PAINLEVEL_OUTOF10: 8

## 2023-06-27 ASSESSMENT — PAIN - FUNCTIONAL ASSESSMENT
PAIN_FUNCTIONAL_ASSESSMENT: ACTIVITIES ARE NOT PREVENTED
PAIN_FUNCTIONAL_ASSESSMENT: ACTIVITIES ARE NOT PREVENTED

## 2023-06-27 ASSESSMENT — PAIN DESCRIPTION - LOCATION
LOCATION: ABDOMEN;INCISION
LOCATION: INCISION
LOCATION: INCISION

## 2023-06-28 LAB
ANION GAP SERPL CALC-SCNC: 7 MMOL/L (ref 5–15)
BUN SERPL-MCNC: 5 MG/DL (ref 6–20)
BUN/CREAT SERPL: 9 (ref 12–20)
CALCIUM SERPL-MCNC: 8.4 MG/DL (ref 8.5–10.1)
CHLORIDE SERPL-SCNC: 107 MMOL/L (ref 97–108)
CO2 SERPL-SCNC: 26 MMOL/L (ref 21–32)
CREAT SERPL-MCNC: 0.53 MG/DL (ref 0.55–1.02)
GLUCOSE SERPL-MCNC: 107 MG/DL (ref 65–100)
POTASSIUM SERPL-SCNC: 3.3 MMOL/L (ref 3.5–5.1)
SODIUM SERPL-SCNC: 140 MMOL/L (ref 136–145)

## 2023-06-28 PROCEDURE — 36415 COLL VENOUS BLD VENIPUNCTURE: CPT

## 2023-06-28 PROCEDURE — 6370000000 HC RX 637 (ALT 250 FOR IP): Performed by: ADVANCED PRACTICE MIDWIFE

## 2023-06-28 PROCEDURE — 6370000000 HC RX 637 (ALT 250 FOR IP): Performed by: OBSTETRICS & GYNECOLOGY

## 2023-06-28 PROCEDURE — 80048 BASIC METABOLIC PNL TOTAL CA: CPT

## 2023-06-28 PROCEDURE — 1120000000 HC RM PRIVATE OB

## 2023-06-28 RX ORDER — SIMETHICONE 80 MG
80 TABLET,CHEWABLE ORAL EVERY 6 HOURS PRN
Status: DISCONTINUED | OUTPATIENT
Start: 2023-06-28 | End: 2023-06-29 | Stop reason: HOSPADM

## 2023-06-28 RX ORDER — ACETAMINOPHEN 325 MG/1
650 TABLET ORAL EVERY 6 HOURS PRN
Status: DISPENSED | OUTPATIENT
Start: 2023-06-28 | End: 2023-06-29

## 2023-06-28 RX ADMIN — IBUPROFEN 800 MG: 400 TABLET, FILM COATED ORAL at 13:34

## 2023-06-28 RX ADMIN — DOCUSATE SODIUM 100 MG: 100 CAPSULE, LIQUID FILLED ORAL at 21:54

## 2023-06-28 RX ADMIN — OXYCODONE HYDROCHLORIDE 5 MG: 5 TABLET ORAL at 05:42

## 2023-06-28 RX ADMIN — OXYCODONE HYDROCHLORIDE 5 MG: 5 TABLET ORAL at 21:54

## 2023-06-28 RX ADMIN — OXYCODONE HYDROCHLORIDE 5 MG: 5 TABLET ORAL at 10:01

## 2023-06-28 RX ADMIN — OXYCODONE HYDROCHLORIDE 5 MG: 5 TABLET ORAL at 00:35

## 2023-06-28 RX ADMIN — IBUPROFEN 800 MG: 400 TABLET, FILM COATED ORAL at 21:53

## 2023-06-28 RX ADMIN — DOCUSATE SODIUM 100 MG: 100 CAPSULE, LIQUID FILLED ORAL at 10:01

## 2023-06-28 RX ADMIN — ACETAMINOPHEN 650 MG: 325 TABLET ORAL at 00:35

## 2023-06-28 RX ADMIN — SIMETHICONE 80 MG: 80 TABLET, CHEWABLE ORAL at 22:18

## 2023-06-28 RX ADMIN — ACETAMINOPHEN 650 MG: 325 TABLET ORAL at 12:02

## 2023-06-28 RX ADMIN — IBUPROFEN 800 MG: 400 TABLET, FILM COATED ORAL at 05:42

## 2023-06-28 RX ADMIN — OXYCODONE HYDROCHLORIDE 5 MG: 5 TABLET ORAL at 13:34

## 2023-06-28 RX ADMIN — OXYCODONE HYDROCHLORIDE 5 MG: 5 TABLET ORAL at 17:52

## 2023-06-28 RX ADMIN — ACETAMINOPHEN 650 MG: 325 TABLET ORAL at 05:42

## 2023-06-28 RX ADMIN — ACETAMINOPHEN 650 MG: 325 TABLET ORAL at 21:54

## 2023-06-28 ASSESSMENT — PAIN DESCRIPTION - LOCATION
LOCATION: INCISION
LOCATION: ABDOMEN
LOCATION: ABDOMEN;INCISION
LOCATION: ABDOMEN
LOCATION: ABDOMEN
LOCATION: INCISION;BACK
LOCATION: INCISION

## 2023-06-28 ASSESSMENT — PAIN DESCRIPTION - DESCRIPTORS
DESCRIPTORS: SORE
DESCRIPTORS: ACHING;BURNING
DESCRIPTORS: SORE
DESCRIPTORS: ACHING
DESCRIPTORS: ACHING

## 2023-06-28 ASSESSMENT — PAIN DESCRIPTION - ORIENTATION
ORIENTATION: LOWER

## 2023-06-28 ASSESSMENT — PAIN - FUNCTIONAL ASSESSMENT: PAIN_FUNCTIONAL_ASSESSMENT: ACTIVITIES ARE NOT PREVENTED

## 2023-06-28 ASSESSMENT — PAIN SCALES - GENERAL
PAINLEVEL_OUTOF10: 3
PAINLEVEL_OUTOF10: 7
PAINLEVEL_OUTOF10: 7
PAINLEVEL_OUTOF10: 9
PAINLEVEL_OUTOF10: 4
PAINLEVEL_OUTOF10: 6
PAINLEVEL_OUTOF10: 7

## 2023-06-29 VITALS
OXYGEN SATURATION: 97 % | TEMPERATURE: 98.1 F | SYSTOLIC BLOOD PRESSURE: 101 MMHG | HEIGHT: 63 IN | DIASTOLIC BLOOD PRESSURE: 68 MMHG | HEART RATE: 75 BPM | WEIGHT: 180 LBS | RESPIRATION RATE: 18 BRPM | BODY MASS INDEX: 31.89 KG/M2

## 2023-06-29 PROCEDURE — 6370000000 HC RX 637 (ALT 250 FOR IP): Performed by: OBSTETRICS & GYNECOLOGY

## 2023-06-29 PROCEDURE — 6370000000 HC RX 637 (ALT 250 FOR IP): Performed by: ADVANCED PRACTICE MIDWIFE

## 2023-06-29 RX ORDER — PSEUDOEPHEDRINE HCL 30 MG
100 TABLET ORAL 2 TIMES DAILY
Qty: 60 CAPSULE | Refills: 1 | Status: SHIPPED | OUTPATIENT
Start: 2023-06-29 | End: 2023-06-29 | Stop reason: SDUPTHER

## 2023-06-29 RX ORDER — IBUPROFEN 800 MG/1
800 TABLET ORAL EVERY 8 HOURS PRN
Qty: 120 TABLET | Refills: 3 | Status: SHIPPED | OUTPATIENT
Start: 2023-06-29 | End: 2023-06-29 | Stop reason: SDUPTHER

## 2023-06-29 RX ORDER — PSEUDOEPHEDRINE HCL 30 MG
100 TABLET ORAL 2 TIMES DAILY
Qty: 60 CAPSULE | Refills: 1 | Status: SHIPPED | OUTPATIENT
Start: 2023-06-29

## 2023-06-29 RX ORDER — OXYCODONE HYDROCHLORIDE 5 MG/1
5 TABLET ORAL EVERY 4 HOURS PRN
Qty: 12 TABLET | Refills: 0 | Status: SHIPPED | OUTPATIENT
Start: 2023-06-29 | End: 2023-06-29 | Stop reason: SDUPTHER

## 2023-06-29 RX ORDER — OXYCODONE HYDROCHLORIDE 5 MG/1
5 TABLET ORAL EVERY 4 HOURS PRN
Qty: 12 TABLET | Refills: 0 | Status: SHIPPED | OUTPATIENT
Start: 2023-06-29 | End: 2023-07-02

## 2023-06-29 RX ORDER — ACETAMINOPHEN 325 MG/1
1000 TABLET ORAL EVERY 8 HOURS PRN
Qty: 120 TABLET | Refills: 0 | Status: SHIPPED | OUTPATIENT
Start: 2023-06-29

## 2023-06-29 RX ORDER — IBUPROFEN 800 MG/1
800 TABLET ORAL EVERY 8 HOURS PRN
Qty: 120 TABLET | Refills: 3 | Status: SHIPPED | OUTPATIENT
Start: 2023-06-29

## 2023-06-29 RX ORDER — ACETAMINOPHEN 325 MG/1
1000 TABLET ORAL EVERY 8 HOURS PRN
Qty: 120 TABLET | Refills: 0 | Status: SHIPPED | OUTPATIENT
Start: 2023-06-29 | End: 2023-06-29 | Stop reason: SDUPTHER

## 2023-06-29 RX ADMIN — SIMETHICONE 80 MG: 80 TABLET, CHEWABLE ORAL at 03:02

## 2023-06-29 RX ADMIN — OXYCODONE HYDROCHLORIDE 5 MG: 5 TABLET ORAL at 16:36

## 2023-06-29 RX ADMIN — OXYCODONE HYDROCHLORIDE 5 MG: 5 TABLET ORAL at 03:02

## 2023-06-29 RX ADMIN — OXYCODONE HYDROCHLORIDE 5 MG: 5 TABLET ORAL at 06:52

## 2023-06-29 RX ADMIN — DOCUSATE SODIUM 100 MG: 100 CAPSULE, LIQUID FILLED ORAL at 08:16

## 2023-06-29 RX ADMIN — ACETAMINOPHEN 650 MG: 325 TABLET ORAL at 03:02

## 2023-06-29 RX ADMIN — SIMETHICONE 80 MG: 80 TABLET, CHEWABLE ORAL at 16:36

## 2023-06-29 RX ADMIN — SIMETHICONE 80 MG: 80 TABLET, CHEWABLE ORAL at 08:16

## 2023-06-29 RX ADMIN — ACETAMINOPHEN 650 MG: 325 TABLET ORAL at 11:49

## 2023-06-29 RX ADMIN — IBUPROFEN 800 MG: 400 TABLET, FILM COATED ORAL at 16:36

## 2023-06-29 RX ADMIN — IBUPROFEN 800 MG: 400 TABLET, FILM COATED ORAL at 06:52

## 2023-06-29 ASSESSMENT — PAIN DESCRIPTION - DESCRIPTORS: DESCRIPTORS: ACHING

## 2023-06-29 ASSESSMENT — PAIN SCALES - GENERAL
PAINLEVEL_OUTOF10: 4
PAINLEVEL_OUTOF10: 5
PAINLEVEL_OUTOF10: 7

## 2023-06-29 ASSESSMENT — PAIN DESCRIPTION - LOCATION: LOCATION: ABDOMEN

## 2023-07-06 ENCOUNTER — OFFICE VISIT (OUTPATIENT)
Age: 33
End: 2023-07-06

## 2023-07-06 DIAGNOSIS — Z98.891 S/P CESAREAN SECTION: Primary | ICD-10-CM

## 2023-07-06 PROCEDURE — 99024 POSTOP FOLLOW-UP VISIT: CPT | Performed by: OBSTETRICS & GYNECOLOGY

## 2023-07-06 NOTE — PROGRESS NOTES
Problem Visit    Ector Cool is a 28 y.o. presenting for problem visit. Interpretor# [id number]. Her main concern today is incision check. Patient is s/p primary LTCS . Taking Motrin 800 mg for pain. Pumping breast milk. Baby being discharged from [facility] NICU today. Pain is controlled.          Ob/Gyn Hx:   -  x5, LTCS x1  LMP- Postpartum  Menses- Postpartum  Contraception- Postpartum  SA- Yes        Past Medical History:   Diagnosis Date    Abnormal Papanicolaou smear of cervix     ASCUS; will follow up after delivery    Anemia     on iron    Encounter for insertion of mirena IUD 2021    Gastritis     Gestational hypertension     patient stated that she has had high blood pressure in pregnancy previously    HGSIL on Pap smear of cervix     Postpartum depression     previously took medication; doesn't remember what medicine       Past Surgical History:   Procedure Laterality Date     SECTION N/A 2023     SECTION performed by Jann Steven MD at [facility] L&D OR       Family History   Problem Relation Age of Onset    No Known Problems Mother     No Known Problems Father        Social History     Socioeconomic History    Marital status:      Spouse name: Not on file    Number of children: Not on file    Years of education: Not on file    Highest education level: Not on file   Occupational History    Not on file   Tobacco Use    Smoking status: Never    Smokeless tobacco: Never   Substance and Sexual Activity    Alcohol use: Not Currently    Drug use: Not Currently    Sexual activity: Not on file   Other Topics Concern    Not on file   Social History Narrative    Not on file     Social Determinants of Health     Financial Resource Strain: Not on file   Food Insecurity: Not on file   Transportation Needs: Not on file   Physical Activity: Not on file   Stress: Not on file   Social Connections: Not on file   Intimate Partner Violence: Not on file   Housing Stability: Not on file

## 2023-07-07 VITALS — WEIGHT: 166 LBS | SYSTOLIC BLOOD PRESSURE: 112 MMHG | DIASTOLIC BLOOD PRESSURE: 60 MMHG | BODY MASS INDEX: 29.41 KG/M2

## 2023-08-22 ENCOUNTER — PROCEDURE VISIT (OUTPATIENT)
Age: 33
End: 2023-08-22

## 2023-08-22 VITALS — SYSTOLIC BLOOD PRESSURE: 116 MMHG | WEIGHT: 159 LBS | DIASTOLIC BLOOD PRESSURE: 72 MMHG | BODY MASS INDEX: 28.17 KG/M2

## 2023-08-22 PROCEDURE — 0503F POSTPARTUM CARE VISIT: CPT | Performed by: OBSTETRICS & GYNECOLOGY

## 2024-09-05 ENCOUNTER — APPOINTMENT (OUTPATIENT)
Facility: HOSPITAL | Age: 34
End: 2024-09-05
Payer: MEDICAID

## 2024-09-05 ENCOUNTER — HOSPITAL ENCOUNTER (EMERGENCY)
Facility: HOSPITAL | Age: 34
Discharge: HOME | End: 2024-09-05
Attending: EMERGENCY MEDICINE
Payer: MEDICAID

## 2024-09-05 VITALS
HEIGHT: 62 IN | SYSTOLIC BLOOD PRESSURE: 126 MMHG | RESPIRATION RATE: 20 BRPM | DIASTOLIC BLOOD PRESSURE: 83 MMHG | OXYGEN SATURATION: 100 % | BODY MASS INDEX: 29.86 KG/M2 | TEMPERATURE: 97.5 F | WEIGHT: 162.26 LBS | HEART RATE: 102 BPM

## 2024-09-05 DIAGNOSIS — M79.602 LEFT ARM PAIN: ICD-10-CM

## 2024-09-05 DIAGNOSIS — R10.9 FLANK PAIN: Primary | ICD-10-CM

## 2024-09-05 LAB
ALBUMIN SERPL-MCNC: 4.3 G/DL (ref 3.5–5)
ALBUMIN/GLOB SERPL: 1.1 (ref 1.1–2.2)
ALP SERPL-CCNC: 96 U/L (ref 45–117)
ALT SERPL-CCNC: 41 U/L (ref 12–78)
ANION GAP SERPL CALC-SCNC: 4 MMOL/L (ref 2–12)
APPEARANCE UR: CLEAR
AST SERPL-CCNC: 23 U/L (ref 15–37)
BACTERIA URNS QL MICRO: NEGATIVE /HPF
BASOPHILS # BLD: 0 K/UL (ref 0–0.1)
BASOPHILS NFR BLD: 0 % (ref 0–1)
BILIRUB SERPL-MCNC: 0.3 MG/DL (ref 0.2–1)
BILIRUB UR QL: NEGATIVE
BUN SERPL-MCNC: 8 MG/DL (ref 6–20)
BUN/CREAT SERPL: 14 (ref 12–20)
CALCIUM SERPL-MCNC: 9.2 MG/DL (ref 8.5–10.1)
CHLORIDE SERPL-SCNC: 105 MMOL/L (ref 97–108)
CO2 SERPL-SCNC: 29 MMOL/L (ref 21–32)
COLOR UR: ABNORMAL
COMMENT:: NORMAL
CREAT SERPL-MCNC: 0.59 MG/DL (ref 0.55–1.02)
DIFFERENTIAL METHOD BLD: NORMAL
EKG ATRIAL RATE: 99 BPM
EKG DIAGNOSIS: NORMAL
EKG P AXIS: 48 DEGREES
EKG P-R INTERVAL: 136 MS
EKG Q-T INTERVAL: 342 MS
EKG QRS DURATION: 74 MS
EKG QTC CALCULATION (BAZETT): 438 MS
EKG R AXIS: 83 DEGREES
EKG T AXIS: 21 DEGREES
EKG VENTRICULAR RATE: 99 BPM
EOSINOPHIL # BLD: 0.1 K/UL (ref 0–0.4)
EOSINOPHIL NFR BLD: 2 % (ref 0–7)
EPITH CASTS URNS QL MICRO: ABNORMAL /LPF
ERYTHROCYTE [DISTWIDTH] IN BLOOD BY AUTOMATED COUNT: 13.5 % (ref 11.5–14.5)
GLOBULIN SER CALC-MCNC: 4 G/DL (ref 2–4)
GLUCOSE SERPL-MCNC: 105 MG/DL (ref 65–100)
GLUCOSE UR STRIP.AUTO-MCNC: NEGATIVE MG/DL
HCT VFR BLD AUTO: 39.6 % (ref 35–47)
HGB BLD-MCNC: 12.6 G/DL (ref 11.5–16)
HGB UR QL STRIP: ABNORMAL
IMM GRANULOCYTES # BLD AUTO: 0 K/UL (ref 0–0.04)
IMM GRANULOCYTES NFR BLD AUTO: 0 % (ref 0–0.5)
KETONES UR QL STRIP.AUTO: NEGATIVE MG/DL
LEUKOCYTE ESTERASE UR QL STRIP.AUTO: ABNORMAL
LIPASE SERPL-CCNC: 52 U/L (ref 13–75)
LYMPHOCYTES # BLD: 2.5 K/UL (ref 0.8–3.5)
LYMPHOCYTES NFR BLD: 40 % (ref 12–49)
MCH RBC QN AUTO: 28.4 PG (ref 26–34)
MCHC RBC AUTO-ENTMCNC: 31.8 G/DL (ref 30–36.5)
MCV RBC AUTO: 89.4 FL (ref 80–99)
MONOCYTES # BLD: 0.4 K/UL (ref 0–1)
MONOCYTES NFR BLD: 6 % (ref 5–13)
NEUTS SEG # BLD: 3.2 K/UL (ref 1.8–8)
NEUTS SEG NFR BLD: 52 % (ref 32–75)
NITRITE UR QL STRIP.AUTO: NEGATIVE
NRBC # BLD: 0 K/UL (ref 0–0.01)
NRBC BLD-RTO: 0 PER 100 WBC
PH UR STRIP: 7.5 (ref 5–8)
PLATELET # BLD AUTO: 223 K/UL (ref 150–400)
PMV BLD AUTO: 10.6 FL (ref 8.9–12.9)
POTASSIUM SERPL-SCNC: 3.7 MMOL/L (ref 3.5–5.1)
PROT SERPL-MCNC: 8.3 G/DL (ref 6.4–8.2)
PROT UR STRIP-MCNC: NEGATIVE MG/DL
RBC # BLD AUTO: 4.43 M/UL (ref 3.8–5.2)
RBC #/AREA URNS HPF: ABNORMAL /HPF (ref 0–5)
SODIUM SERPL-SCNC: 138 MMOL/L (ref 136–145)
SP GR UR REFRACTOMETRY: 1.01 (ref 1–1.03)
SPECIMEN HOLD: NORMAL
SPECIMEN HOLD: NORMAL
TROPONIN I SERPL HS-MCNC: <4 NG/L (ref 0–51)
UROBILINOGEN UR QL STRIP.AUTO: 0.2 EU/DL (ref 0.2–1)
WBC # BLD AUTO: 6.2 K/UL (ref 3.6–11)
WBC URNS QL MICRO: ABNORMAL /HPF (ref 0–4)

## 2024-09-05 PROCEDURE — 36415 COLL VENOUS BLD VENIPUNCTURE: CPT

## 2024-09-05 PROCEDURE — 85025 COMPLETE CBC W/AUTO DIFF WBC: CPT

## 2024-09-05 PROCEDURE — 81001 URINALYSIS AUTO W/SCOPE: CPT

## 2024-09-05 PROCEDURE — 6360000002 HC RX W HCPCS: Performed by: STUDENT IN AN ORGANIZED HEALTH CARE EDUCATION/TRAINING PROGRAM

## 2024-09-05 PROCEDURE — 99285 EMERGENCY DEPT VISIT HI MDM: CPT

## 2024-09-05 PROCEDURE — 80053 COMPREHEN METABOLIC PANEL: CPT

## 2024-09-05 PROCEDURE — 83690 ASSAY OF LIPASE: CPT

## 2024-09-05 PROCEDURE — 96374 THER/PROPH/DIAG INJ IV PUSH: CPT

## 2024-09-05 PROCEDURE — 84484 ASSAY OF TROPONIN QUANT: CPT

## 2024-09-05 PROCEDURE — 71046 X-RAY EXAM CHEST 2 VIEWS: CPT

## 2024-09-05 PROCEDURE — 93005 ELECTROCARDIOGRAM TRACING: CPT | Performed by: STUDENT IN AN ORGANIZED HEALTH CARE EDUCATION/TRAINING PROGRAM

## 2024-09-05 RX ORDER — KETOROLAC TROMETHAMINE 30 MG/ML
15 INJECTION, SOLUTION INTRAMUSCULAR; INTRAVENOUS ONCE
Status: COMPLETED | OUTPATIENT
Start: 2024-09-05 | End: 2024-09-05

## 2024-09-05 RX ORDER — KETOROLAC TROMETHAMINE 10 MG/1
10 TABLET, FILM COATED ORAL EVERY 6 HOURS PRN
Qty: 20 TABLET | Refills: 0 | Status: SHIPPED | OUTPATIENT
Start: 2024-09-05

## 2024-09-05 RX ADMIN — KETOROLAC TROMETHAMINE 15 MG: 30 INJECTION, SOLUTION INTRAMUSCULAR at 12:38

## 2024-09-05 ASSESSMENT — ENCOUNTER SYMPTOMS
COUGH: 0
EYE PAIN: 0
ABDOMINAL PAIN: 0
SORE THROAT: 0
DIARRHEA: 0
NAUSEA: 0
VOMITING: 0
SHORTNESS OF BREATH: 0

## 2024-09-05 ASSESSMENT — PAIN DESCRIPTION - DESCRIPTORS: DESCRIPTORS: SHARP

## 2024-09-05 ASSESSMENT — PAIN SCALES - GENERAL: PAINLEVEL_OUTOF10: 9

## 2024-09-05 ASSESSMENT — PAIN DESCRIPTION - PAIN TYPE: TYPE: ACUTE PAIN

## 2024-09-05 ASSESSMENT — PAIN DESCRIPTION - ONSET: ONSET: ON-GOING

## 2024-09-05 ASSESSMENT — PAIN - FUNCTIONAL ASSESSMENT
PAIN_FUNCTIONAL_ASSESSMENT: 0-10
PAIN_FUNCTIONAL_ASSESSMENT: ACTIVITIES ARE NOT PREVENTED

## 2024-09-05 ASSESSMENT — PAIN DESCRIPTION - ORIENTATION: ORIENTATION: LEFT

## 2024-09-05 ASSESSMENT — PAIN DESCRIPTION - FREQUENCY: FREQUENCY: CONTINUOUS

## 2024-09-05 NOTE — ED PROVIDER NOTES
[facility] EMERGENCY DEP  EMERGENCY DEPARTMENT ENCOUNTER      Pt Name: Alyson Jorge  MRN: [id number]  Birthdate 1990  Date of evaluation: 9/5/2024  Provider: ALAINA MUÑOZ    CHIEF COMPLAINT       Chief Complaint   Patient presents with    Flank Pain    Rib Pain (injury)         HISTORY OF PRESENT ILLNESS   (Location/Symptom, Timing/Onset, Context/Setting, Quality, Duration, Modifying Factors, Severity)  Note limiting factors.   34 y.o. female presents to ED with L sided pain and vision changes. Patient reports that for the past 3 days she has had a L sided pain in her L flank/ LUQ of her abdomen. She also notes that she feels like is having L arm pain and difficulty with using this arm. She also notes that today while she was driving she had a sudden onset of some blurred vision in bilateral eyes that lasted less than a minute, since then she notes her vision has resolved. She denies any SOB, cough, CP, N/V/D, fevers, chills. No history of similar symptoms.     The history is provided by the patient. The history is limited by a language barrier. A  was used.         Review of External Medical Records:     Nursing Notes were reviewed.    REVIEW OF SYSTEMS    (2-9 systems for level 4, 10 or more for level 5)     Review of Systems   Constitutional:  Negative for chills and fever.   HENT:  Negative for congestion, ear pain and sore throat.    Eyes:  Negative for pain.   Respiratory:  Negative for cough and shortness of breath.    Cardiovascular:  Negative for chest pain.   Gastrointestinal:  Negative for abdominal pain, diarrhea, nausea and vomiting.   Genitourinary:  Negative for dysuria and flank pain.   Musculoskeletal:  Negative for myalgias.   Skin:  Negative for rash.   Neurological:  Negative for dizziness and headaches.   Hematological:  Negative for adenopathy.       Except as noted above the remainder of the review of systems was reviewed and negative.       PAST MEDICAL    URINALYSIS WITH MICROSCOPIC - Abnormal; Notable for the following components:    Blood, Urine MODERATE (*)     Leukocyte Esterase, Urine MODERATE (*)     All other components within normal limits   URINE CULTURE HOLD SAMPLE   CBC WITH AUTO DIFFERENTIAL   LIPASE   TROPONIN   EXTRA TUBES HOLD       All other labs were within normal range or not returned as of this dictation.    EMERGENCY DEPARTMENT COURSE and DIFFERENTIAL DIAGNOSIS/MDM:   Vitals:    Vitals:    09/05/24 1130   BP: 126/83   Pulse: (!) 102   Resp: 20   Temp: 97.5 °F (36.4 °C)   TempSrc: Oral   SpO2: 100%   Weight: 73.6 kg (162 lb 4.1 oz)   Height: 1.575 m (5' 2\")           Medical Decision Making  34-year-old female presents ED with left flank pain.  Vital signs stable in triage with patient afebrile.  Noted borderline tachycardia at 102 bpm but otherwise oxygen saturation 100% and normal blood pressure.  Physical exam notable for reproducible tenderness to palpation to left lower rib cage but otherwise heart sounds normal, lungs clear to auscultation bilaterally and no abdominal tenderness to palpation.  Labs unremarkable troponin less than 4, see below for EKG interpretation.  Labs otherwise unremarkable no indication of urinary tract infection.  Chest x-ray clear.  Considered further imaging such as CT of abdomen pelvis but given no abdominal tenderness to palpation with normal labs and stable vital signs no indication for this at this time.  Given reproducible nature of pain with normal labs and imaging do suspect musculoskeletal etiology of symptoms but patient being rejected and guards to conservative care measures, return precautions and follow-up.    Results and findings have been communicated and explained thoroughly to patient and family members. Patient has been educated on strict return precautions as well as instructions for conservative care and follow-up. Patient will be discharged with prescription for toradol and has been given

## 2024-09-05 NOTE — ED TRIAGE NOTES
Per  # [id number]    Patient is coming in with complaints of left flank pain and rib and pain to the left arm. Blurry vision while driving today that lasted less than a minute in both arms. Normal vision now. Denies SOB and CP

## 2024-10-11 NOTE — CONSULTS
Left message for patient letting her know she was due for appointment in November.    Session ID: [id number]  Language: Chinese   ID: #[id number]   Name: Katy Skinner

## (undated) DEVICE — APPLICATOR MEDICATED 26 CC SOLUTION HI LT ORNG CHLORAPREP

## (undated) DEVICE — STERILE POLYISOPRENE POWDER-FREE SURGICAL GLOVES WITH EMOLLIENT COATING: Brand: PROTEXIS

## (undated) DEVICE — NEOPUFF INFANT RESUSCITATOR EN: Brand: FISHER & PAYKEL HEALTHCARE

## (undated) DEVICE — SUTURE VCRL SZ 0 L36IN ABSRB VLT L40MM CT 1/2 CIR J358H

## (undated) DEVICE — ATTACHMENT SMK 3/8INX10FT VALLEYLAB

## (undated) DEVICE — Device

## (undated) DEVICE — KIWI® VACUUM DELIVERY SYSTEM, OMNI-C CUP® FOR CESAREAN SECTION: Brand: KIWI® OMNI-C CUP®

## (undated) DEVICE — KENDALL SCD EXPRESS SLEEVES, KNEE LENGTH, MEDIUM: Brand: KENDALL SCD

## (undated) DEVICE — SOLUTION IRRIG 1000ML 0.9% SOD CHL USP POUR PLAS BTL

## (undated) DEVICE — SPINAL TRAY NDL 24 GAX4 IN SPROTTE

## (undated) DEVICE — SUTURE PLN GUT SZ 2-0 L27IN ABSRB YELLOWISH TAN L70MM XLH 53T

## (undated) DEVICE — SUTURE MCRYL SZ 3-0 L27IN ABSRB UD L60MM KS STR REV CUT Y523H

## (undated) DEVICE — DEVICE ES L3M EDGE COAT HEX LOK BLDE ELECTRD HOLSTER FORC

## (undated) DEVICE — SUTURE VCRL SZ 1 L36IN ABSRB VLT L48MM CTX 1/2 CIR J371H

## (undated) DEVICE — SOLUTION IRRIG 1000ML STRL H2O USP PLAS POUR BTL

## (undated) DEVICE — SENSOR PLSE OXMTR NEO AD 40KG ADH DISP NELLCOR

## (undated) DEVICE — AGENT HEMSTAT 3GM OXIDIZED REGENERATED CELOS ABSRB FOR CONT (ORDER MULTIPLES OF 5EA)

## (undated) DEVICE — GLOVE SURG SZ 6 THK91MIL LTX FREE SYN POLYISOPRENE ANTI

## (undated) DEVICE — DRESSING SIL W4XL5IN ANTIBACT GELLING FBR CYTOFORM

## (undated) DEVICE — CONNEXT SURGICAL MATRIX - LARGE SYRINGE: Brand: CONNEXT SURGICAL MATRIX